# Patient Record
Sex: FEMALE | Race: BLACK OR AFRICAN AMERICAN | Employment: UNEMPLOYED | URBAN - METROPOLITAN AREA
[De-identification: names, ages, dates, MRNs, and addresses within clinical notes are randomized per-mention and may not be internally consistent; named-entity substitution may affect disease eponyms.]

---

## 2017-01-01 ENCOUNTER — HOSPITAL ENCOUNTER (OUTPATIENT)
Dept: OTHER | Age: 57
Discharge: HOME | End: 2017-01-31
Attending: OBSTETRICS & GYNECOLOGY | Admitting: OBSTETRICS & GYNECOLOGY

## 2017-02-01 ENCOUNTER — OFFICE VISIT (OUTPATIENT)
Dept: OBGYN CLINIC | Age: 57
End: 2017-02-01

## 2017-02-01 VITALS
HEIGHT: 62 IN | HEART RATE: 96 BPM | WEIGHT: 254 LBS | BODY MASS INDEX: 46.74 KG/M2 | TEMPERATURE: 98.5 F | SYSTOLIC BLOOD PRESSURE: 137 MMHG | DIASTOLIC BLOOD PRESSURE: 72 MMHG

## 2017-02-01 DIAGNOSIS — N95.0 POSTMENOPAUSAL BLEEDING: Primary | ICD-10-CM

## 2017-02-01 PROCEDURE — 99213 OFFICE O/P EST LOW 20 MIN: CPT | Performed by: OBSTETRICS & GYNECOLOGY

## 2017-02-24 ENCOUNTER — TELEPHONE (OUTPATIENT)
Dept: PERINATAL CARE | Age: 57
End: 2017-02-24

## 2017-03-01 ENCOUNTER — HOSPITAL ENCOUNTER (OUTPATIENT)
Dept: SURGERY | Age: 57
Discharge: HOME | End: 2017-03-01
Attending: OBSTETRICS & GYNECOLOGY | Admitting: OBSTETRICS & GYNECOLOGY

## 2017-03-01 VITALS
OXYGEN SATURATION: 98 % | SYSTOLIC BLOOD PRESSURE: 124 MMHG | WEIGHT: 254 LBS | RESPIRATION RATE: 16 BRPM | HEART RATE: 84 BPM | TEMPERATURE: 98 F | BODY MASS INDEX: 46.74 KG/M2 | HEIGHT: 62 IN | DIASTOLIC BLOOD PRESSURE: 60 MMHG

## 2017-03-01 PROCEDURE — 58561 HYSTEROSCOPY REMOVE MYOMA: CPT | Performed by: OBSTETRICS & GYNECOLOGY

## 2017-03-01 RX ORDER — SODIUM CHLORIDE 0.9 % (FLUSH) 0.9 %
10 SYRINGE (ML) INJECTION EVERY 12 HOURS SCHEDULED
Status: DISCONTINUED | OUTPATIENT
Start: 2017-03-01 | End: 2017-03-02 | Stop reason: HOSPADM

## 2017-03-01 RX ORDER — OXYCODONE HYDROCHLORIDE AND ACETAMINOPHEN 5; 325 MG/1; MG/1
2 TABLET ORAL PRN
Status: ACTIVE | OUTPATIENT
Start: 2017-03-01 | End: 2017-03-01

## 2017-03-01 RX ORDER — OXYCODONE HYDROCHLORIDE AND ACETAMINOPHEN 5; 325 MG/1; MG/1
1 TABLET ORAL PRN
Status: ACTIVE | OUTPATIENT
Start: 2017-03-01 | End: 2017-03-01

## 2017-03-01 RX ORDER — MEPERIDINE HYDROCHLORIDE 50 MG/ML
12.5 INJECTION INTRAMUSCULAR; INTRAVENOUS; SUBCUTANEOUS EVERY 5 MIN PRN
Status: DISCONTINUED | OUTPATIENT
Start: 2017-03-01 | End: 2017-03-02 | Stop reason: HOSPADM

## 2017-03-01 RX ORDER — HYDRALAZINE HYDROCHLORIDE 20 MG/ML
5 INJECTION INTRAMUSCULAR; INTRAVENOUS EVERY 10 MIN PRN
Status: DISCONTINUED | OUTPATIENT
Start: 2017-03-01 | End: 2017-03-02 | Stop reason: HOSPADM

## 2017-03-01 RX ORDER — SODIUM CHLORIDE 0.9 % (FLUSH) 0.9 %
10 SYRINGE (ML) INJECTION PRN
Status: DISCONTINUED | OUTPATIENT
Start: 2017-03-01 | End: 2017-03-02 | Stop reason: HOSPADM

## 2017-03-01 RX ORDER — ONDANSETRON 2 MG/ML
4 INJECTION INTRAMUSCULAR; INTRAVENOUS EVERY 10 MIN PRN
Status: DISCONTINUED | OUTPATIENT
Start: 2017-03-01 | End: 2017-03-02 | Stop reason: HOSPADM

## 2017-03-01 RX ORDER — LABETALOL HYDROCHLORIDE 5 MG/ML
5 INJECTION, SOLUTION INTRAVENOUS EVERY 10 MIN PRN
Status: DISCONTINUED | OUTPATIENT
Start: 2017-03-01 | End: 2017-03-02 | Stop reason: HOSPADM

## 2017-03-01 RX ORDER — SODIUM CHLORIDE, SODIUM LACTATE, POTASSIUM CHLORIDE, CALCIUM CHLORIDE 600; 310; 30; 20 MG/100ML; MG/100ML; MG/100ML; MG/100ML
INJECTION, SOLUTION INTRAVENOUS CONTINUOUS
Status: DISCONTINUED | OUTPATIENT
Start: 2017-03-01 | End: 2017-03-02 | Stop reason: HOSPADM

## 2017-03-01 RX ORDER — OXYCODONE HYDROCHLORIDE AND ACETAMINOPHEN 5; 325 MG/1; MG/1
1 TABLET ORAL EVERY 6 HOURS PRN
Qty: 30 TABLET | Refills: 0 | Status: SHIPPED | OUTPATIENT
Start: 2017-03-01 | End: 2017-03-08

## 2017-03-01 RX ORDER — LIDOCAINE HYDROCHLORIDE 10 MG/ML
1 INJECTION, SOLUTION EPIDURAL; INFILTRATION; INTRACAUDAL; PERINEURAL
Status: ACTIVE | OUTPATIENT
Start: 2017-03-01 | End: 2017-03-01

## 2017-03-01 RX ADMIN — Medication 0.25 MG: at 12:42

## 2017-03-01 RX ADMIN — SODIUM CHLORIDE, SODIUM LACTATE, POTASSIUM CHLORIDE, CALCIUM CHLORIDE: 600; 310; 30; 20 INJECTION, SOLUTION INTRAVENOUS at 09:52

## 2017-03-01 RX ADMIN — Medication 0.25 MG: at 12:30

## 2017-03-01 ASSESSMENT — PAIN SCALES - GENERAL
PAINLEVEL_OUTOF10: 7
PAINLEVEL_OUTOF10: 9
PAINLEVEL_OUTOF10: 0
PAINLEVEL_OUTOF10: 2

## 2017-03-01 ASSESSMENT — PAIN - FUNCTIONAL ASSESSMENT: PAIN_FUNCTIONAL_ASSESSMENT: 0-10

## 2017-03-15 ENCOUNTER — TELEPHONE (OUTPATIENT)
Dept: OBGYN CLINIC | Age: 57
End: 2017-03-15

## 2017-03-22 ENCOUNTER — OFFICE VISIT (OUTPATIENT)
Dept: OBGYN CLINIC | Age: 57
End: 2017-03-22

## 2017-03-22 VITALS
BODY MASS INDEX: 45.82 KG/M2 | DIASTOLIC BLOOD PRESSURE: 69 MMHG | WEIGHT: 249 LBS | SYSTOLIC BLOOD PRESSURE: 144 MMHG | HEIGHT: 62 IN | HEART RATE: 71 BPM

## 2017-03-22 DIAGNOSIS — Z98.890 POST-OPERATIVE STATE: Primary | ICD-10-CM

## 2017-03-22 PROCEDURE — 99024 POSTOP FOLLOW-UP VISIT: CPT | Performed by: OBSTETRICS & GYNECOLOGY

## 2017-03-29 PROBLEM — J98.11 ATELECTASIS: Status: ACTIVE | Noted: 2017-03-29

## 2017-04-04 ENCOUNTER — HOSPITAL ENCOUNTER (OUTPATIENT)
Dept: CT IMAGING | Age: 57
Discharge: HOME | End: 2017-04-04
Attending: OBSTETRICS & GYNECOLOGY | Admitting: OBSTETRICS & GYNECOLOGY

## 2017-04-04 DIAGNOSIS — C55 MALIGNANT MIXED MULLERIAN TUMOR (MMMT) OF UTERUS (HCC): ICD-10-CM

## 2017-04-04 DIAGNOSIS — C55 MALIGNANT NEOPLASM OF UTERUS (HCC): ICD-10-CM

## 2017-04-14 LAB
ABO GROUPING: NORMAL
ANTIBODY SCREEN: NEGATIVE
CA 125: 25.4 U/ML (ref 0–35)
POTASSIUM SERPL-SCNC: 5.7 MMOL/L (ref 3.5–5.1)
RH FACTOR: POSITIVE

## 2017-04-20 LAB — PATHOLOGY REPORT: NORMAL

## 2017-04-25 PROBLEM — C55 MALIGNANT MIXED MULLERIAN TUMOR (MMMT) OF UTERUS (HCC): Status: ACTIVE | Noted: 2017-04-25

## 2017-05-25 PROBLEM — C54.9: Status: ACTIVE | Noted: 2017-05-25

## 2017-07-27 PROBLEM — N18.30 CHRONIC KIDNEY DISEASE (CKD), STAGE III (MODERATE) (HCC): Status: ACTIVE | Noted: 2017-04-15

## 2018-05-22 ENCOUNTER — HOSPITAL ENCOUNTER (OUTPATIENT)
Dept: CT IMAGING | Age: 58
Discharge: HOME | End: 2018-05-22
Attending: INTERNAL MEDICINE | Admitting: INTERNAL MEDICINE

## 2018-05-22 DIAGNOSIS — C55 MALIGNANT NEOPLASM OF UTERUS (HCC): ICD-10-CM

## 2018-05-22 DIAGNOSIS — C55 MALIGNANT NEOPLASM OF UTERUS, UNSPECIFIED SITE (HCC): ICD-10-CM

## 2018-12-10 ENCOUNTER — HOSPITAL ENCOUNTER (OUTPATIENT)
Dept: MAMMOGRAPHY | Age: 58
Discharge: HOME | End: 2018-12-10
Payer: MEDICARE

## 2018-12-10 DIAGNOSIS — Z12.31 VISIT FOR SCREENING MAMMOGRAM: ICD-10-CM

## 2018-12-10 PROCEDURE — 77067 SCR MAMMO BI INCL CAD: CPT

## 2019-01-08 ENCOUNTER — HOSPITAL ENCOUNTER (OUTPATIENT)
Dept: CT IMAGING | Age: 59
Discharge: HOME | End: 2019-01-08
Payer: MEDICARE

## 2019-01-08 DIAGNOSIS — Q51.818 MULLERIAN ANOMALY OF UTERUS: ICD-10-CM

## 2019-01-08 PROCEDURE — 74176 CT ABD & PELVIS W/O CONTRAST: CPT

## 2019-01-08 PROCEDURE — 6360000004 HC RX CONTRAST MEDICATION: Performed by: INTERNAL MEDICINE

## 2019-01-08 RX ADMIN — IOHEXOL 50 ML: 240 INJECTION, SOLUTION INTRATHECAL; INTRAVASCULAR; INTRAVENOUS; ORAL at 13:50

## 2019-05-09 ENCOUNTER — HOSPITAL ENCOUNTER (OUTPATIENT)
Dept: CT IMAGING | Age: 59
Discharge: HOME | End: 2019-05-09
Payer: MEDICARE

## 2019-05-09 DIAGNOSIS — C55 MALIGNANT NEOPLASM OF UTERUS, UNSPECIFIED SITE (HCC): ICD-10-CM

## 2019-05-09 LAB
GFR AFRICAN AMERICAN: 27
GFR NON-AFRICAN AMERICAN: 23
PERFORMED ON: ABNORMAL
POC CREATININE: 2.2 MG/DL (ref 0.6–1.2)
POC SAMPLE TYPE: ABNORMAL

## 2019-05-09 PROCEDURE — 82565 ASSAY OF CREATININE: CPT

## 2019-05-09 PROCEDURE — 6360000004 HC RX CONTRAST MEDICATION: Performed by: INTERNAL MEDICINE

## 2019-05-09 PROCEDURE — 74176 CT ABD & PELVIS W/O CONTRAST: CPT

## 2019-05-09 RX ADMIN — IOHEXOL 50 ML: 240 INJECTION, SOLUTION INTRATHECAL; INTRAVASCULAR; INTRAVENOUS; ORAL at 12:53

## 2019-11-12 ENCOUNTER — HOSPITAL ENCOUNTER (OUTPATIENT)
Dept: CT IMAGING | Age: 59
Discharge: HOME | End: 2019-11-12
Payer: MEDICARE

## 2019-11-12 DIAGNOSIS — C55 MALIGNANT MIXED MULLERIAN TUMOR (MMMT) OF UTERUS (HCC): ICD-10-CM

## 2019-11-12 PROCEDURE — 74176 CT ABD & PELVIS W/O CONTRAST: CPT

## 2020-06-18 ENCOUNTER — HOSPITAL ENCOUNTER (OUTPATIENT)
Dept: CT IMAGING | Age: 60
Discharge: HOME | End: 2020-06-18
Payer: MEDICARE

## 2020-06-18 LAB
GFR AFRICAN AMERICAN: 20
GFR NON-AFRICAN AMERICAN: 16
PERFORMED ON: ABNORMAL
POC CREATININE: 2.9 MG/DL (ref 0.6–1.2)
POC SAMPLE TYPE: ABNORMAL

## 2020-06-18 PROCEDURE — 6360000004 HC RX CONTRAST MEDICATION: Performed by: INTERNAL MEDICINE

## 2020-06-18 PROCEDURE — 74176 CT ABD & PELVIS W/O CONTRAST: CPT

## 2020-06-18 PROCEDURE — 82565 ASSAY OF CREATININE: CPT

## 2020-06-18 RX ADMIN — IOHEXOL 50 ML: 240 INJECTION, SOLUTION INTRATHECAL; INTRAVASCULAR; INTRAVENOUS; ORAL at 17:53

## 2021-09-24 ENCOUNTER — HOSPITAL ENCOUNTER (OUTPATIENT)
Dept: MAMMOGRAPHY | Age: 61
Discharge: HOME | End: 2021-09-24
Payer: MEDICARE

## 2021-09-24 VITALS — BODY MASS INDEX: 46.01 KG/M2 | HEIGHT: 62 IN | WEIGHT: 250 LBS

## 2021-09-24 DIAGNOSIS — Z12.31 VISIT FOR SCREENING MAMMOGRAM: ICD-10-CM

## 2021-09-24 PROCEDURE — 77067 SCR MAMMO BI INCL CAD: CPT

## 2023-09-15 ENCOUNTER — HOSPITAL ENCOUNTER (OUTPATIENT)
Dept: MAMMOGRAPHY | Age: 63
Discharge: HOME | End: 2023-09-15
Payer: MEDICARE

## 2023-09-15 VITALS — HEIGHT: 62 IN | WEIGHT: 293 LBS | BODY MASS INDEX: 53.92 KG/M2

## 2023-09-15 DIAGNOSIS — Z12.31 VISIT FOR SCREENING MAMMOGRAM: ICD-10-CM

## 2023-09-15 PROCEDURE — 77067 SCR MAMMO BI INCL CAD: CPT

## 2024-01-12 DIAGNOSIS — D63.1 ANEMIA OF CHRONIC RENAL FAILURE, STAGE 5 (HCC): Primary | ICD-10-CM

## 2024-01-12 DIAGNOSIS — N18.5 ANEMIA OF CHRONIC RENAL FAILURE, STAGE 5 (HCC): Primary | ICD-10-CM

## 2024-01-12 PROBLEM — D50.9 IRON (FE) DEFICIENCY ANEMIA: Status: ACTIVE | Noted: 2024-01-12

## 2024-01-12 RX ORDER — DIPHENHYDRAMINE HYDROCHLORIDE 50 MG/ML
50 INJECTION INTRAMUSCULAR; INTRAVENOUS
OUTPATIENT
Start: 2024-01-12

## 2024-01-12 RX ORDER — ONDANSETRON 2 MG/ML
8 INJECTION INTRAMUSCULAR; INTRAVENOUS
OUTPATIENT
Start: 2024-01-12

## 2024-01-12 RX ORDER — SODIUM CHLORIDE 9 MG/ML
5-250 INJECTION, SOLUTION INTRAVENOUS PRN
OUTPATIENT
Start: 2024-01-12

## 2024-01-12 RX ORDER — EPINEPHRINE 1 MG/ML
0.3 INJECTION, SOLUTION INTRAMUSCULAR; SUBCUTANEOUS PRN
OUTPATIENT
Start: 2024-01-12

## 2024-01-12 RX ORDER — HEPARIN 100 UNIT/ML
500 SYRINGE INTRAVENOUS PRN
OUTPATIENT
Start: 2024-01-12

## 2024-01-12 RX ORDER — ALBUTEROL SULFATE 90 UG/1
4 AEROSOL, METERED RESPIRATORY (INHALATION) PRN
OUTPATIENT
Start: 2024-01-12

## 2024-01-12 RX ORDER — SODIUM CHLORIDE 0.9 % (FLUSH) 0.9 %
5-40 SYRINGE (ML) INJECTION PRN
OUTPATIENT
Start: 2024-01-12

## 2024-01-12 RX ORDER — SODIUM CHLORIDE 9 MG/ML
INJECTION, SOLUTION INTRAVENOUS CONTINUOUS
OUTPATIENT
Start: 2024-01-12

## 2024-01-12 RX ORDER — ACETAMINOPHEN 325 MG/1
650 TABLET ORAL
OUTPATIENT
Start: 2024-01-12

## 2024-02-02 RX ORDER — SODIUM CHLORIDE 9 MG/ML
5-250 INJECTION, SOLUTION INTRAVENOUS PRN
OUTPATIENT
Start: 2024-02-02

## 2024-02-02 RX ORDER — ACETAMINOPHEN 325 MG/1
650 TABLET ORAL
OUTPATIENT
Start: 2024-02-02

## 2024-02-02 RX ORDER — SODIUM CHLORIDE 9 MG/ML
INJECTION, SOLUTION INTRAVENOUS CONTINUOUS
OUTPATIENT
Start: 2024-02-02

## 2024-02-02 RX ORDER — EPINEPHRINE 1 MG/ML
0.3 INJECTION, SOLUTION INTRAMUSCULAR; SUBCUTANEOUS PRN
OUTPATIENT
Start: 2024-02-02

## 2024-02-02 RX ORDER — HEPARIN 100 UNIT/ML
500 SYRINGE INTRAVENOUS PRN
OUTPATIENT
Start: 2024-02-02

## 2024-02-02 RX ORDER — ALBUTEROL SULFATE 90 UG/1
4 AEROSOL, METERED RESPIRATORY (INHALATION) PRN
OUTPATIENT
Start: 2024-02-02

## 2024-02-02 RX ORDER — DIPHENHYDRAMINE HYDROCHLORIDE 50 MG/ML
50 INJECTION INTRAMUSCULAR; INTRAVENOUS
OUTPATIENT
Start: 2024-02-02

## 2024-02-02 RX ORDER — SODIUM CHLORIDE 0.9 % (FLUSH) 0.9 %
5-40 SYRINGE (ML) INJECTION PRN
OUTPATIENT
Start: 2024-02-02

## 2024-02-02 RX ORDER — ONDANSETRON 2 MG/ML
8 INJECTION INTRAMUSCULAR; INTRAVENOUS
OUTPATIENT
Start: 2024-02-02

## 2024-02-23 ENCOUNTER — HOSPITAL ENCOUNTER (OUTPATIENT)
Dept: ONCOLOGY | Age: 64
Setting detail: INFUSION SERIES
Discharge: HOME | End: 2024-02-23
Payer: MEDICARE

## 2024-02-23 VITALS
DIASTOLIC BLOOD PRESSURE: 76 MMHG | HEART RATE: 72 BPM | RESPIRATION RATE: 14 BRPM | TEMPERATURE: 97.7 F | OXYGEN SATURATION: 96 % | SYSTOLIC BLOOD PRESSURE: 173 MMHG

## 2024-02-23 DIAGNOSIS — D63.1 ANEMIA OF CHRONIC RENAL FAILURE, STAGE 5 (HCC): Primary | ICD-10-CM

## 2024-02-23 DIAGNOSIS — N18.5 ANEMIA OF CHRONIC RENAL FAILURE, STAGE 5 (HCC): Primary | ICD-10-CM

## 2024-02-23 PROCEDURE — 6360000002 HC RX W HCPCS: Performed by: INTERNAL MEDICINE

## 2024-02-23 PROCEDURE — 96374 THER/PROPH/DIAG INJ IV PUSH: CPT

## 2024-02-23 RX ORDER — HEPARIN 100 UNIT/ML
500 SYRINGE INTRAVENOUS PRN
OUTPATIENT
Start: 2024-03-01

## 2024-02-23 RX ORDER — SODIUM CHLORIDE 9 MG/ML
5-250 INJECTION, SOLUTION INTRAVENOUS PRN
OUTPATIENT
Start: 2024-03-01

## 2024-02-23 RX ORDER — SODIUM CHLORIDE 9 MG/ML
5-250 INJECTION, SOLUTION INTRAVENOUS PRN
Status: DISCONTINUED | OUTPATIENT
Start: 2024-02-23 | End: 2024-02-24 | Stop reason: HOSPADM

## 2024-02-23 RX ORDER — DIPHENHYDRAMINE HYDROCHLORIDE 50 MG/ML
50 INJECTION INTRAMUSCULAR; INTRAVENOUS
OUTPATIENT
Start: 2024-03-01

## 2024-02-23 RX ORDER — ACETAMINOPHEN 325 MG/1
650 TABLET ORAL
OUTPATIENT
Start: 2024-03-01

## 2024-02-23 RX ORDER — ALBUTEROL SULFATE 90 UG/1
4 AEROSOL, METERED RESPIRATORY (INHALATION) PRN
OUTPATIENT
Start: 2024-03-01

## 2024-02-23 RX ORDER — SODIUM CHLORIDE 0.9 % (FLUSH) 0.9 %
5-40 SYRINGE (ML) INJECTION PRN
OUTPATIENT
Start: 2024-03-01

## 2024-02-23 RX ORDER — EPINEPHRINE 1 MG/ML
0.3 INJECTION, SOLUTION INTRAMUSCULAR; SUBCUTANEOUS PRN
OUTPATIENT
Start: 2024-03-01

## 2024-02-23 RX ORDER — SODIUM CHLORIDE 9 MG/ML
INJECTION, SOLUTION INTRAVENOUS CONTINUOUS
OUTPATIENT
Start: 2024-03-01

## 2024-02-23 RX ORDER — ONDANSETRON 2 MG/ML
8 INJECTION INTRAMUSCULAR; INTRAVENOUS
OUTPATIENT
Start: 2024-03-01

## 2024-02-23 RX ADMIN — IRON SUCROSE 200 MG: 20 INJECTION, SOLUTION INTRAVENOUS at 14:51

## 2024-02-23 NOTE — PROGRESS NOTES
Pt seen and assessed [facility]PO today for 200 mg IV push Venofer per orders from Dr. Box.  Pt tolerated infusion well and without incident.  Pt verbalizes understanding of discharge instructions.  Discharged via wheelchair to home with transportation.

## 2024-06-21 ENCOUNTER — APPOINTMENT (OUTPATIENT)
Dept: CT IMAGING | Age: 64
DRG: 870 | End: 2024-06-21
Payer: MEDICARE

## 2024-06-21 ENCOUNTER — APPOINTMENT (OUTPATIENT)
Dept: VASCULAR LAB | Age: 64
DRG: 870 | End: 2024-06-21
Attending: EMERGENCY MEDICINE
Payer: MEDICARE

## 2024-06-21 ENCOUNTER — APPOINTMENT (OUTPATIENT)
Dept: GENERAL RADIOLOGY | Age: 64
DRG: 870 | End: 2024-06-21
Payer: MEDICARE

## 2024-06-21 ENCOUNTER — HOSPITAL ENCOUNTER (EMERGENCY)
Age: 64
Discharge: HOME | DRG: 870 | End: 2024-06-21
Attending: EMERGENCY MEDICINE
Payer: MEDICARE

## 2024-06-21 ENCOUNTER — HOSPITAL ENCOUNTER (INPATIENT)
Age: 64
LOS: 8 days | Discharge: ANOTHER HOSPITAL | DRG: 870 | End: 2024-06-29
Attending: EMERGENCY MEDICINE | Admitting: INTERNAL MEDICINE
Payer: MEDICARE

## 2024-06-21 VITALS
OXYGEN SATURATION: 96 % | HEART RATE: 115 BPM | DIASTOLIC BLOOD PRESSURE: 78 MMHG | TEMPERATURE: 99.6 F | RESPIRATION RATE: 24 BRPM | WEIGHT: 293 LBS | SYSTOLIC BLOOD PRESSURE: 93 MMHG | BODY MASS INDEX: 51.91 KG/M2 | HEIGHT: 63 IN

## 2024-06-21 DIAGNOSIS — N18.5 ACUTE RENAL FAILURE SUPERIMPOSED ON STAGE 5 CHRONIC KIDNEY DISEASE, NOT ON CHRONIC DIALYSIS, UNSPECIFIED ACUTE RENAL FAILURE TYPE (HCC): ICD-10-CM

## 2024-06-21 DIAGNOSIS — N17.9 ACUTE RENAL FAILURE, UNSPECIFIED ACUTE RENAL FAILURE TYPE (HCC): ICD-10-CM

## 2024-06-21 DIAGNOSIS — I99.8 ISCHEMIA OF FOOT: ICD-10-CM

## 2024-06-21 DIAGNOSIS — N17.9 ACUTE RENAL FAILURE SUPERIMPOSED ON STAGE 5 CHRONIC KIDNEY DISEASE, NOT ON CHRONIC DIALYSIS, UNSPECIFIED ACUTE RENAL FAILURE TYPE (HCC): ICD-10-CM

## 2024-06-21 DIAGNOSIS — M79.604 PAIN IN RIGHT LEG: Primary | ICD-10-CM

## 2024-06-21 DIAGNOSIS — L03.115 CELLULITIS OF RIGHT LOWER EXTREMITY: Primary | ICD-10-CM

## 2024-06-21 DIAGNOSIS — I46.9 CARDIAC ARREST (HCC): ICD-10-CM

## 2024-06-21 DIAGNOSIS — A41.9 SEPTICEMIA (HCC): ICD-10-CM

## 2024-06-21 LAB
ALBUMIN SERPL-MCNC: 2.9 G/DL (ref 3.4–5)
ANION GAP SERPL CALCULATED.3IONS-SCNC: 18 MMOL/L (ref 3–16)
ANION GAP SERPL CALCULATED.3IONS-SCNC: 20 MMOL/L (ref 3–16)
ANION GAP SERPL CALCULATED.3IONS-SCNC: 22 MMOL/L (ref 3–16)
BASE EXCESS BLDA CALC-SCNC: -11.2 MMOL/L (ref -3–3)
BASE EXCESS BLDA CALC-SCNC: -12.4 MMOL/L (ref -3–3)
BASE EXCESS BLDV CALC-SCNC: -11.2 MMOL/L (ref -2–3)
BASE EXCESS BLDV CALC-SCNC: -9.6 MMOL/L (ref -2–3)
BASOPHILS # BLD: 0 K/UL (ref 0–0.2)
BASOPHILS # BLD: 0 K/UL (ref 0–0.2)
BASOPHILS NFR BLD: 0.2 %
BASOPHILS NFR BLD: 0.6 %
BUN SERPL-MCNC: 61 MG/DL (ref 7–20)
BUN SERPL-MCNC: 70 MG/DL (ref 7–20)
BUN SERPL-MCNC: 79 MG/DL (ref 7–20)
CALCIUM SERPL-MCNC: 8.5 MG/DL (ref 8.3–10.6)
CALCIUM SERPL-MCNC: 8.6 MG/DL (ref 8.3–10.6)
CALCIUM SERPL-MCNC: 9.1 MG/DL (ref 8.3–10.6)
CHLORIDE SERPL-SCNC: 101 MMOL/L (ref 99–110)
CHLORIDE SERPL-SCNC: 97 MMOL/L (ref 99–110)
CHLORIDE SERPL-SCNC: 98 MMOL/L (ref 99–110)
CO2 BLDA-SCNC: 18 MMOL/L
CO2 BLDA-SCNC: 18 MMOL/L
CO2 BLDV-SCNC: 17 MMOL/L
CO2 BLDV-SCNC: 19 MMOL/L
CO2 SERPL-SCNC: 14 MMOL/L (ref 21–32)
CO2 SERPL-SCNC: 15 MMOL/L (ref 21–32)
CO2 SERPL-SCNC: 17 MMOL/L (ref 21–32)
COHGB MFR BLDA: 0.8 % (ref 0–1.5)
COHGB MFR BLDA: 1.1 % (ref 0–1.5)
COHGB MFR BLDV: 1.1 % (ref 0–1.5)
COHGB MFR BLDV: 1.2 % (ref 0–1.5)
CREAT SERPL-MCNC: 5 MG/DL (ref 0.6–1.2)
CREAT SERPL-MCNC: 5.9 MG/DL (ref 0.6–1.2)
CREAT SERPL-MCNC: 6.2 MG/DL (ref 0.6–1.2)
DEPRECATED RDW RBC AUTO: 15.2 % (ref 12.4–15.4)
DEPRECATED RDW RBC AUTO: 15.6 % (ref 12.4–15.4)
DO-HGB MFR BLDV: 31.3 %
DO-HGB MFR BLDV: 42.2 %
ECHO BSA: 2.48 M2
EOSINOPHIL # BLD: 0 K/UL (ref 0–0.6)
EOSINOPHIL # BLD: 0 K/UL (ref 0–0.6)
EOSINOPHIL NFR BLD: 0.4 %
EOSINOPHIL NFR BLD: 0.4 %
GFR SERPLBLD CREATININE-BSD FMLA CKD-EPI: 7 ML/MIN/{1.73_M2}
GFR SERPLBLD CREATININE-BSD FMLA CKD-EPI: 7 ML/MIN/{1.73_M2}
GFR SERPLBLD CREATININE-BSD FMLA CKD-EPI: 9 ML/MIN/{1.73_M2}
GLUCOSE SERPL-MCNC: 123 MG/DL (ref 70–99)
GLUCOSE SERPL-MCNC: 77 MG/DL (ref 70–99)
GLUCOSE SERPL-MCNC: 83 MG/DL (ref 70–99)
HCO3 BLDA-SCNC: 16 MMOL/L (ref 21–29)
HCO3 BLDA-SCNC: 17 MMOL/L (ref 21–29)
HCO3 BLDV-SCNC: 16.3 MMOL/L (ref 24–28)
HCO3 BLDV-SCNC: 17.5 MMOL/L (ref 24–28)
HCT VFR BLD AUTO: 29.6 % (ref 36–48)
HCT VFR BLD AUTO: 29.9 % (ref 36–48)
HGB BLD-MCNC: 9.7 G/DL (ref 12–16)
HGB BLD-MCNC: 9.9 G/DL (ref 12–16)
HGB BLDA-MCNC: 11.7 G/DL
HGB BLDA-MCNC: 5.7 G/DL
LACTATE BLDV-SCNC: 3.9 MMOL/L (ref 0.4–2)
LACTATE BLDV-SCNC: 4.4 MMOL/L (ref 0.4–2)
LACTATE BLDV-SCNC: 4.5 MMOL/L (ref 0.4–2)
LYMPHOCYTES # BLD: 0.1 K/UL (ref 1–5.1)
LYMPHOCYTES # BLD: 0.2 K/UL (ref 1–5.1)
LYMPHOCYTES NFR BLD: 7.2 %
LYMPHOCYTES NFR BLD: 7.3 %
MAGNESIUM SERPL-MCNC: 0.9 MG/DL (ref 1.8–2.4)
MAGNESIUM SERPL-MCNC: 1.7 MG/DL (ref 1.8–2.4)
MCH RBC QN AUTO: 31 PG (ref 26–34)
MCH RBC QN AUTO: 31.5 PG (ref 26–34)
MCHC RBC AUTO-ENTMCNC: 32.7 G/DL (ref 31–36)
MCHC RBC AUTO-ENTMCNC: 33 G/DL (ref 31–36)
MCV RBC AUTO: 95 FL (ref 80–100)
MCV RBC AUTO: 95.4 FL (ref 80–100)
METHGB MFR BLDA: 0.6 % (ref 0–1.4)
METHGB MFR BLDA: 1 % (ref 0–1.4)
METHGB MFR BLDV: 0.3 % (ref 0–1.5)
METHGB MFR BLDV: 0.8 % (ref 0–1.5)
MONOCYTES # BLD: 0.2 K/UL (ref 0–1.3)
MONOCYTES # BLD: 0.4 K/UL (ref 0–1.3)
MONOCYTES NFR BLD: 10.6 %
MONOCYTES NFR BLD: 12.6 %
NEUTROPHILS # BLD: 1.1 K/UL (ref 1.7–7.7)
NEUTROPHILS # BLD: 2.7 K/UL (ref 1.7–7.7)
NEUTROPHILS NFR BLD: 79.6 %
NEUTROPHILS NFR BLD: 81.1 %
NT-PROBNP SERPL-MCNC: ABNORMAL PG/ML (ref 0–124)
PCO2 BLDA: 46 MMHG (ref 35–45)
PCO2 BLDA: 49.6 MMHG (ref 35–45)
PCO2 BLDV: 34.6 MMHG (ref 41–51)
PCO2 BLDV: 52.2 MMHG (ref 41–51)
PH BLDA: 7.14 [PH] (ref 7.35–7.45)
PH BLDA: 7.15 [PH] (ref 7.35–7.45)
PH BLDV: 7.13 [PH] (ref 7.35–7.45)
PH BLDV: 7.28 [PH] (ref 7.35–7.45)
PHOSPHATE SERPL-MCNC: 8.1 MG/DL (ref 2.5–4.9)
PLATELET # BLD AUTO: 131 K/UL (ref 135–450)
PLATELET # BLD AUTO: 181 K/UL (ref 135–450)
PMV BLD AUTO: 9.1 FL (ref 5–10.5)
PMV BLD AUTO: 9.4 FL (ref 5–10.5)
PO2 BLDA: 170 MMHG (ref 75–108)
PO2 BLDA: 97 MMHG (ref 75–108)
PO2 BLDV: 34.7 MMHG (ref 25–40)
PO2 BLDV: 46.6 MMHG (ref 25–40)
POTASSIUM SERPL-SCNC: 4.4 MMOL/L (ref 3.5–5.1)
POTASSIUM SERPL-SCNC: 5.2 MMOL/L (ref 3.5–5.1)
POTASSIUM SERPL-SCNC: 5.4 MMOL/L (ref 3.5–5.1)
RBC # BLD AUTO: 3.11 M/UL (ref 4–5.2)
RBC # BLD AUTO: 3.13 M/UL (ref 4–5.2)
SAO2 % BLDA: 100 % (ref 93–100)
SAO2 % BLDA: 97 % (ref 93–100)
SAO2 % BLDV: 57 %
SAO2 % BLDV: 68 %
SODIUM SERPL-SCNC: 131 MMOL/L (ref 136–145)
SODIUM SERPL-SCNC: 135 MMOL/L (ref 136–145)
SODIUM SERPL-SCNC: 136 MMOL/L (ref 136–145)
TROPONIN, HIGH SENSITIVITY: 45 NG/L (ref 0–14)
TROPONIN, HIGH SENSITIVITY: 54 NG/L (ref 0–14)
TROPONIN, HIGH SENSITIVITY: 84 NG/L (ref 0–14)
WBC # BLD AUTO: 1.4 K/UL (ref 4–11)
WBC # BLD AUTO: 3.4 K/UL (ref 4–11)

## 2024-06-21 PROCEDURE — 73590 X-RAY EXAM OF LOWER LEG: CPT

## 2024-06-21 PROCEDURE — 36415 COLL VENOUS BLD VENIPUNCTURE: CPT

## 2024-06-21 PROCEDURE — 73700 CT LOWER EXTREMITY W/O DYE: CPT

## 2024-06-21 PROCEDURE — 94002 VENT MGMT INPAT INIT DAY: CPT

## 2024-06-21 PROCEDURE — 51702 INSERT TEMP BLADDER CATH: CPT

## 2024-06-21 PROCEDURE — 80048 BASIC METABOLIC PNL TOTAL CA: CPT

## 2024-06-21 PROCEDURE — 3E033XZ INTRODUCTION OF VASOPRESSOR INTO PERIPHERAL VEIN, PERCUTANEOUS APPROACH: ICD-10-PCS | Performed by: STUDENT IN AN ORGANIZED HEALTH CARE EDUCATION/TRAINING PROGRAM

## 2024-06-21 PROCEDURE — 84484 ASSAY OF TROPONIN QUANT: CPT

## 2024-06-21 PROCEDURE — 6360000002 HC RX W HCPCS: Performed by: EMERGENCY MEDICINE

## 2024-06-21 PROCEDURE — 6360000002 HC RX W HCPCS

## 2024-06-21 PROCEDURE — 2500000003 HC RX 250 WO HCPCS

## 2024-06-21 PROCEDURE — 2580000003 HC RX 258: Performed by: EMERGENCY MEDICINE

## 2024-06-21 PROCEDURE — 85025 COMPLETE CBC W/AUTO DIFF WBC: CPT

## 2024-06-21 PROCEDURE — 2000000000 HC ICU R&B

## 2024-06-21 PROCEDURE — 31500 INSERT EMERGENCY AIRWAY: CPT

## 2024-06-21 PROCEDURE — 96366 THER/PROPH/DIAG IV INF ADDON: CPT

## 2024-06-21 PROCEDURE — 5A1955Z RESPIRATORY VENTILATION, GREATER THAN 96 CONSECUTIVE HOURS: ICD-10-PCS

## 2024-06-21 PROCEDURE — 83880 ASSAY OF NATRIURETIC PEPTIDE: CPT

## 2024-06-21 PROCEDURE — 82803 BLOOD GASES ANY COMBINATION: CPT

## 2024-06-21 PROCEDURE — 99291 CRITICAL CARE FIRST HOUR: CPT

## 2024-06-21 PROCEDURE — 5A12012 PERFORMANCE OF CARDIAC OUTPUT, SINGLE, MANUAL: ICD-10-PCS | Performed by: STUDENT IN AN ORGANIZED HEALTH CARE EDUCATION/TRAINING PROGRAM

## 2024-06-21 PROCEDURE — 83605 ASSAY OF LACTIC ACID: CPT

## 2024-06-21 PROCEDURE — 96375 TX/PRO/DX INJ NEW DRUG ADDON: CPT

## 2024-06-21 PROCEDURE — 87641 MR-STAPH DNA AMP PROBE: CPT

## 2024-06-21 PROCEDURE — 93971 EXTREMITY STUDY: CPT

## 2024-06-21 PROCEDURE — 0BH17EZ INSERTION OF ENDOTRACHEAL AIRWAY INTO TRACHEA, VIA NATURAL OR ARTIFICIAL OPENING: ICD-10-PCS

## 2024-06-21 PROCEDURE — 99284 EMERGENCY DEPT VISIT MOD MDM: CPT

## 2024-06-21 PROCEDURE — 96365 THER/PROPH/DIAG IV INF INIT: CPT

## 2024-06-21 PROCEDURE — 71045 X-RAY EXAM CHEST 1 VIEW: CPT

## 2024-06-21 PROCEDURE — 94761 N-INVAS EAR/PLS OXIMETRY MLT: CPT

## 2024-06-21 PROCEDURE — 73630 X-RAY EXAM OF FOOT: CPT

## 2024-06-21 PROCEDURE — 2580000003 HC RX 258

## 2024-06-21 PROCEDURE — 83735 ASSAY OF MAGNESIUM: CPT

## 2024-06-21 PROCEDURE — 6370000000 HC RX 637 (ALT 250 FOR IP): Performed by: EMERGENCY MEDICINE

## 2024-06-21 PROCEDURE — 37799 UNLISTED PX VASCULAR SURGERY: CPT

## 2024-06-21 PROCEDURE — 80069 RENAL FUNCTION PANEL: CPT

## 2024-06-21 PROCEDURE — 02HV33Z INSERTION OF INFUSION DEVICE INTO SUPERIOR VENA CAVA, PERCUTANEOUS APPROACH: ICD-10-PCS | Performed by: STUDENT IN AN ORGANIZED HEALTH CARE EDUCATION/TRAINING PROGRAM

## 2024-06-21 PROCEDURE — 2500000003 HC RX 250 WO HCPCS: Performed by: EMERGENCY MEDICINE

## 2024-06-21 PROCEDURE — 6370000000 HC RX 637 (ALT 250 FOR IP)

## 2024-06-21 PROCEDURE — 36556 INSERT NON-TUNNEL CV CATH: CPT

## 2024-06-21 PROCEDURE — 93971 EXTREMITY STUDY: CPT | Performed by: SURGERY

## 2024-06-21 PROCEDURE — 36620 INSERTION CATHETER ARTERY: CPT

## 2024-06-21 PROCEDURE — 36600 WITHDRAWAL OF ARTERIAL BLOOD: CPT

## 2024-06-21 PROCEDURE — 94660 CPAP INITIATION&MGMT: CPT

## 2024-06-21 PROCEDURE — 2700000000 HC OXYGEN THERAPY PER DAY

## 2024-06-21 PROCEDURE — 96374 THER/PROPH/DIAG INJ IV PUSH: CPT

## 2024-06-21 RX ORDER — EPINEPHRINE 0.1 MG/ML
INJECTION INTRAVENOUS
Status: DISPENSED
Start: 2024-06-21 | End: 2024-06-22

## 2024-06-21 RX ORDER — HYDROMORPHONE HYDROCHLORIDE 1 MG/ML
1 INJECTION, SOLUTION INTRAMUSCULAR; INTRAVENOUS; SUBCUTANEOUS ONCE
Status: COMPLETED | OUTPATIENT
Start: 2024-06-21 | End: 2024-06-21

## 2024-06-21 RX ORDER — POLYETHYLENE GLYCOL 3350 17 G/17G
17 POWDER, FOR SOLUTION ORAL DAILY PRN
Status: DISCONTINUED | OUTPATIENT
Start: 2024-06-21 | End: 2024-06-26

## 2024-06-21 RX ORDER — SODIUM CHLORIDE 0.9 % (FLUSH) 0.9 %
5-40 SYRINGE (ML) INJECTION PRN
Status: DISCONTINUED | OUTPATIENT
Start: 2024-06-21 | End: 2024-06-30 | Stop reason: HOSPADM

## 2024-06-21 RX ORDER — FUROSEMIDE 10 MG/ML
40 INJECTION INTRAMUSCULAR; INTRAVENOUS ONCE
Status: DISCONTINUED | OUTPATIENT
Start: 2024-06-21 | End: 2024-06-21

## 2024-06-21 RX ORDER — LABETALOL 300 MG/1
300 TABLET, FILM COATED ORAL 2 TIMES DAILY
COMMUNITY

## 2024-06-21 RX ORDER — ONDANSETRON 4 MG/1
4 TABLET, ORALLY DISINTEGRATING ORAL EVERY 8 HOURS PRN
Status: DISCONTINUED | OUTPATIENT
Start: 2024-06-21 | End: 2024-06-30 | Stop reason: HOSPADM

## 2024-06-21 RX ORDER — SODIUM CHLORIDE, SODIUM LACTATE, POTASSIUM CHLORIDE, AND CALCIUM CHLORIDE .6; .31; .03; .02 G/100ML; G/100ML; G/100ML; G/100ML
1000 INJECTION, SOLUTION INTRAVENOUS ONCE
Status: COMPLETED | OUTPATIENT
Start: 2024-06-21 | End: 2024-06-21

## 2024-06-21 RX ORDER — ACETAMINOPHEN 325 MG/1
650 TABLET ORAL ONCE
Status: COMPLETED | OUTPATIENT
Start: 2024-06-21 | End: 2024-06-21

## 2024-06-21 RX ORDER — HEPARIN SODIUM 5000 [USP'U]/ML
5000 INJECTION, SOLUTION INTRAVENOUS; SUBCUTANEOUS EVERY 8 HOURS SCHEDULED
Status: DISCONTINUED | OUTPATIENT
Start: 2024-06-21 | End: 2024-06-30 | Stop reason: HOSPADM

## 2024-06-21 RX ORDER — EPOETIN ALFA-EPBX 20000 [IU]/ML
20000 INJECTION, SOLUTION INTRAVENOUS; SUBCUTANEOUS
COMMUNITY

## 2024-06-21 RX ORDER — METHOCARBAMOL 500 MG/1
500 TABLET, FILM COATED ORAL 3 TIMES DAILY
COMMUNITY

## 2024-06-21 RX ORDER — CALCIUM CHLORIDE 100 MG/ML
INJECTION INTRAVENOUS; INTRAVENTRICULAR DAILY PRN
Status: COMPLETED | OUTPATIENT
Start: 2024-06-21 | End: 2024-06-21

## 2024-06-21 RX ORDER — ETOMIDATE 2 MG/ML
INJECTION INTRAVENOUS
Status: DISPENSED
Start: 2024-06-21 | End: 2024-06-22

## 2024-06-21 RX ORDER — ACETAMINOPHEN 325 MG/1
650 TABLET ORAL EVERY 6 HOURS PRN
Status: DISCONTINUED | OUTPATIENT
Start: 2024-06-21 | End: 2024-06-30 | Stop reason: HOSPADM

## 2024-06-21 RX ORDER — SODIUM CHLORIDE, SODIUM LACTATE, POTASSIUM CHLORIDE, AND CALCIUM CHLORIDE .6; .31; .03; .02 G/100ML; G/100ML; G/100ML; G/100ML
500 INJECTION, SOLUTION INTRAVENOUS ONCE
Status: DISCONTINUED | OUTPATIENT
Start: 2024-06-21 | End: 2024-06-24

## 2024-06-21 RX ORDER — ROCURONIUM BROMIDE 50 MG/5 ML
SYRINGE (ML) INTRAVENOUS DAILY PRN
Status: COMPLETED | OUTPATIENT
Start: 2024-06-21 | End: 2024-06-21

## 2024-06-21 RX ORDER — MAGNESIUM SULFATE IN WATER 40 MG/ML
2000 INJECTION, SOLUTION INTRAVENOUS ONCE
Status: COMPLETED | OUTPATIENT
Start: 2024-06-22 | End: 2024-06-22

## 2024-06-21 RX ORDER — TORSEMIDE 20 MG/1
2 TABLET ORAL DAILY
COMMUNITY

## 2024-06-21 RX ORDER — ROCURONIUM BROMIDE 10 MG/ML
INJECTION, SOLUTION INTRAVENOUS
Status: DISPENSED
Start: 2024-06-21 | End: 2024-06-22

## 2024-06-21 RX ORDER — NOREPINEPHRINE BITARTRATE 1 MG/ML
INJECTION, SOLUTION INTRAVENOUS
Status: COMPLETED
Start: 2024-06-21 | End: 2024-06-21

## 2024-06-21 RX ORDER — ACETAMINOPHEN 325 MG/1
650 TABLET ORAL EVERY 8 HOURS PRN
COMMUNITY

## 2024-06-21 RX ORDER — EPOETIN ALFA-EPBX 2000 [IU]/ML
2000 INJECTION, SOLUTION INTRAVENOUS; SUBCUTANEOUS
Status: ON HOLD | COMMUNITY
End: 2024-06-21 | Stop reason: CLARIF

## 2024-06-21 RX ORDER — SODIUM CHLORIDE 0.9 % (FLUSH) 0.9 %
5-40 SYRINGE (ML) INJECTION EVERY 12 HOURS SCHEDULED
Status: DISCONTINUED | OUTPATIENT
Start: 2024-06-21 | End: 2024-06-30 | Stop reason: HOSPADM

## 2024-06-21 RX ORDER — MAGNESIUM SULFATE IN WATER 40 MG/ML
2000 INJECTION, SOLUTION INTRAVENOUS ONCE
Status: COMPLETED | OUTPATIENT
Start: 2024-06-21 | End: 2024-06-21

## 2024-06-21 RX ORDER — ONDANSETRON 2 MG/ML
4 INJECTION INTRAMUSCULAR; INTRAVENOUS EVERY 6 HOURS PRN
Status: DISCONTINUED | OUTPATIENT
Start: 2024-06-21 | End: 2024-06-30 | Stop reason: HOSPADM

## 2024-06-21 RX ORDER — ACETAMINOPHEN 650 MG/1
650 SUPPOSITORY RECTAL EVERY 6 HOURS PRN
Status: DISCONTINUED | OUTPATIENT
Start: 2024-06-21 | End: 2024-06-30 | Stop reason: HOSPADM

## 2024-06-21 RX ORDER — ETOMIDATE 2 MG/ML
INJECTION INTRAVENOUS DAILY PRN
Status: COMPLETED | OUTPATIENT
Start: 2024-06-21 | End: 2024-06-21

## 2024-06-21 RX ORDER — SODIUM CHLORIDE 9 MG/ML
INJECTION, SOLUTION INTRAVENOUS PRN
Status: DISCONTINUED | OUTPATIENT
Start: 2024-06-21 | End: 2024-06-30 | Stop reason: HOSPADM

## 2024-06-21 RX ORDER — ACETAMINOPHEN 650 MG/1
650 SUPPOSITORY RECTAL ONCE
Status: COMPLETED | OUTPATIENT
Start: 2024-06-21 | End: 2024-06-21

## 2024-06-21 RX ORDER — PATIROMER 16.8 G/1
1 POWDER, FOR SUSPENSION ORAL DAILY
COMMUNITY

## 2024-06-21 RX ORDER — MORPHINE SULFATE 4 MG/ML
4 INJECTION INTRAVENOUS ONCE
Status: COMPLETED | OUTPATIENT
Start: 2024-06-21 | End: 2024-06-21

## 2024-06-21 RX ORDER — GUAIFENESIN AND DEXTROMETHORPHAN HYDROBROMIDE 100; 10 MG/5ML; MG/5ML
10 SOLUTION ORAL EVERY 6 HOURS PRN
COMMUNITY

## 2024-06-21 RX ORDER — EPINEPHRINE IN SOD CHLOR,ISO 1 MG/10 ML
SYRINGE (ML) INTRAVENOUS DAILY PRN
Status: COMPLETED | OUTPATIENT
Start: 2024-06-21 | End: 2024-06-21

## 2024-06-21 RX ADMIN — MORPHINE SULFATE 4 MG: 4 INJECTION INTRAVENOUS at 05:40

## 2024-06-21 RX ADMIN — CEFEPIME 2000 MG: 2 INJECTION, POWDER, FOR SOLUTION INTRAVENOUS at 17:39

## 2024-06-21 RX ADMIN — HYDROMORPHONE HYDROCHLORIDE 1 MG: 1 INJECTION, SOLUTION INTRAMUSCULAR; INTRAVENOUS; SUBCUTANEOUS at 06:34

## 2024-06-21 RX ADMIN — SODIUM BICARBONATE 50 MEQ: 84 INJECTION, SOLUTION INTRAVENOUS at 19:08

## 2024-06-21 RX ADMIN — PIPERACILLIN AND TAZOBACTAM 4500 MG: 4; .5 INJECTION, POWDER, LYOPHILIZED, FOR SOLUTION INTRAVENOUS at 23:52

## 2024-06-21 RX ADMIN — SODIUM CHLORIDE, SODIUM LACTATE, POTASSIUM CHLORIDE, AND CALCIUM CHLORIDE 1000 ML: .6; .31; .03; .02 INJECTION, SOLUTION INTRAVENOUS at 19:00

## 2024-06-21 RX ADMIN — Medication 1 MG: at 18:48

## 2024-06-21 RX ADMIN — ETOMIDATE 20 MG: 2 INJECTION, SOLUTION INTRAVENOUS at 18:53

## 2024-06-21 RX ADMIN — EPINEPHRINE 0.1 MCG/KG/MIN: 1 INJECTION INTRAMUSCULAR; INTRAVENOUS; SUBCUTANEOUS at 19:21

## 2024-06-21 RX ADMIN — SODIUM BICARBONATE 50 MEQ: 84 INJECTION, SOLUTION INTRAVENOUS at 19:11

## 2024-06-21 RX ADMIN — SODIUM BICARBONATE 50 MEQ: 84 INJECTION, SOLUTION INTRAVENOUS at 18:57

## 2024-06-21 RX ADMIN — MAGNESIUM SULFATE HEPTAHYDRATE 2000 MG: 40 INJECTION, SOLUTION INTRAVENOUS at 17:34

## 2024-06-21 RX ADMIN — CALCIUM CHLORIDE 1000 MG: 100 INJECTION, SOLUTION INTRAVENOUS at 18:48

## 2024-06-21 RX ADMIN — NOREPINEPHRINE BITARTRATE 10 MCG/MIN: 1 INJECTION, SOLUTION, CONCENTRATE INTRAVENOUS at 22:58

## 2024-06-21 RX ADMIN — SODIUM CHLORIDE, PRESERVATIVE FREE 10 ML: 5 INJECTION INTRAVENOUS at 22:46

## 2024-06-21 RX ADMIN — ACETAMINOPHEN 650 MG: 325 TABLET ORAL at 10:49

## 2024-06-21 RX ADMIN — Medication 500 MG: at 18:52

## 2024-06-21 RX ADMIN — Medication 1 MG: at 18:55

## 2024-06-21 RX ADMIN — VASOPRESSIN 0.03 UNITS/MIN: 20 INJECTION INTRAVENOUS at 23:45

## 2024-06-21 RX ADMIN — Medication 100 MG: at 18:53

## 2024-06-21 RX ADMIN — HEPARIN SODIUM 5000 UNITS: 5000 INJECTION INTRAVENOUS; SUBCUTANEOUS at 23:10

## 2024-06-21 RX ADMIN — NOREPINEPHRINE BITARTRATE 16 MG: 1 INJECTION, SOLUTION, CONCENTRATE INTRAVENOUS at 23:07

## 2024-06-21 RX ADMIN — VANCOMYCIN HYDROCHLORIDE 2000 MG: 10 INJECTION, POWDER, LYOPHILIZED, FOR SOLUTION INTRAVENOUS at 20:27

## 2024-06-21 RX ADMIN — EPINEPHRINE 0.5 MCG/KG/MIN: 1 INJECTION INTRAMUSCULAR; INTRAVENOUS; SUBCUTANEOUS at 21:58

## 2024-06-21 RX ADMIN — ACETAMINOPHEN 650 MG: 650 SUPPOSITORY RECTAL at 17:33

## 2024-06-21 RX ADMIN — Medication 1 MG: at 19:06

## 2024-06-21 RX ADMIN — Medication 1 MG: at 18:58

## 2024-06-21 RX ADMIN — EPINEPHRINE 0.5 MCG/KG/MIN: 1 INJECTION INTRAMUSCULAR; INTRAVENOUS; SUBCUTANEOUS at 23:16

## 2024-06-21 RX ADMIN — Medication 1 MG: at 18:45

## 2024-06-21 RX ADMIN — Medication 1 MG: at 19:10

## 2024-06-21 RX ADMIN — EPINEPHRINE 0.5 MCG/KG/MIN: 1 INJECTION INTRAMUSCULAR; INTRAVENOUS; SUBCUTANEOUS at 20:46

## 2024-06-21 RX ADMIN — Medication 1 MG: at 19:02

## 2024-06-21 ASSESSMENT — PAIN SCALES - GENERAL
PAINLEVEL_OUTOF10: 0
PAINLEVEL_OUTOF10: 10

## 2024-06-21 ASSESSMENT — PAIN DESCRIPTION - ORIENTATION
ORIENTATION: RIGHT

## 2024-06-21 ASSESSMENT — PAIN DESCRIPTION - DESCRIPTORS
DESCRIPTORS: STABBING

## 2024-06-21 ASSESSMENT — PULMONARY FUNCTION TESTS
PIF_VALUE: 24
PIF_VALUE: 22
PIF_VALUE: 27
PIF_VALUE: 25
PIF_VALUE: 24
PIF_VALUE: 31

## 2024-06-21 ASSESSMENT — PAIN - FUNCTIONAL ASSESSMENT
PAIN_FUNCTIONAL_ASSESSMENT: 0-10
PAIN_FUNCTIONAL_ASSESSMENT: 0-10

## 2024-06-21 ASSESSMENT — ENCOUNTER SYMPTOMS
EYES NEGATIVE: 1
GASTROINTESTINAL NEGATIVE: 1
RESPIRATORY NEGATIVE: 1

## 2024-06-21 ASSESSMENT — PAIN DESCRIPTION - LOCATION
LOCATION: FOOT;LEG
LOCATION: FOOT;LEG
LOCATION: LEG;FOOT

## 2024-06-21 ASSESSMENT — LIFESTYLE VARIABLES
HOW MANY STANDARD DRINKS CONTAINING ALCOHOL DO YOU HAVE ON A TYPICAL DAY: PATIENT DOES NOT DRINK
HOW OFTEN DO YOU HAVE A DRINK CONTAINING ALCOHOL: NEVER

## 2024-06-21 NOTE — ED PROVIDER NOTES
THE [facility]  EMERGENCY DEPARTMENT ENCOUNTER          ATTENDING PHYSICIAN NOTE       Date of evaluation: 6/21/2024    ADDENDUM:      Care of this patient was assumed from Dr Mccollum.  The patient was seen for Leg Pain (Patient presents to ED via [employer] from [facility] with report of right foot and lower leg pain and swelling. Patient reports first noticing the pain and swelling at 1900 last night, denies any known injury or trauma to the extremity.)  .  The patient's initial evaluation and plan have been discussed with the prior provider who initially evaluated the patient.  Nursing Notes, Past Medical Hx, Past Surgical Hx, Social Hx, Allergies, and Family Hx were all reviewed.    ASSESSMENT / PLAN  (MEDICAL DECISION MAKING)     Marcela Colby is a 64 y.o. female Who presents with pain and swelling in the right leg, she reports to me for several days.  On exam, the lower extremity appears warm and well-perfused.  It does have swelling compared to the left leg, but no erythema, no abscess no obvious infected wounds no signs of cellulitis.  We did obtain DVT ultrasound which was negative.  No osseous abnormality was noted.  The patient does have edema in both legs, and says that sometimes asymmetry seems to be there.  Otherwise her renal function appears essentially at baseline, does follow with nephrology for it.  Note was made of some leukopenia on CBC, anemia that appears stable.  On review of the patient's chart she has history of pancytopenia, and is being followed as an outpatient.  Otherwise she appears at her baseline and says that her pain is better now.  This may be due to edema in the lower extremity due to her chronic kidney disease, and we recommended support hose or wrapping to help with that as well, but no obvious signs of cellulitis, necrotizing infection.  No sign of DVT on ultrasound.  Preserved pulses and the foot appears well-perfused.  Recommended follow-up and return for any

## 2024-06-21 NOTE — DISCHARGE INSTRUCTIONS
Return to emergency department for worsening symptoms or other concerns, redness in the leg, increased pain, or any other problems.  Take acetaminophen for pain at home; keep leg elevated when possible at home, consider support hose/ compressive stocking for swelling.  Come back for leg redness, fever, shortness of breath, or any other problems.  Follow up with nephrology regarding your kidney function, and with your primary care doctor

## 2024-06-21 NOTE — ED PROVIDER NOTES
ED Attending Attestation Note     Date of evaluation: 6/21/2024    This patient was seen by the resident.  I have seen and examined the patient, agree with the workup, evaluation, management and diagnosis. The care plan has been discussed.      I have reviewed the ECG and concur with the resident's interpretation.      My assessment reveals a 64-year-old female presenting to the emergency department with a complaint of right leg pain altered mental status.  Patient was seen in the emergency department earlier today where she was evaluated and had negative duplex of her right lower extremity.  She Hien presented with was initially thought to be respiratory distress.  On exam had rhonchorous breath sounds.  Was placed on noninvasive positive pressure ventilation her labs however are more indicative of likely sepsis she is febrile she has a leukopenia had a more severe leukopenia earlier this morning she has acute renal failure with a creatinine greater than 5.  She has very poor urine output here in the emergency department.  She also has a lactate of 4.4 initially.  We were concerned for volume overload given a significantly elevated BNP so have elected to not give the patient 30 cc per kilo, but have covered her with broad-spectrum antibiotics.  The patient had a CT of her right lower extremity which per my interpretation does not appear to show any abscess or focal fluid collections.  I do feel that her right lower extremity cellulitis is her likely source.    Is this patient to be included in the SEP-1 core measure? Yes SEP-1 CORE MEASURE DATA      Sepsis Criteria   Severe Sepsis Criteria   Septic Shock Criteria       Must meet 2:    [x]Temp >100.9 F (38.3 C) or < 96.8 F (36 C)  [x]HR > 90  []RR > 20  []WBC > 12 or < 4 or 10% bands    AND:    [] Infection Confirmed or Suspected.     Must meet 1:    [x]Lactate > 2       or   [x]Signs of Organ Dysfunction:    - SBP < 90 or MAP < 65  -Creatinine > 2 or increased from  baseline  -Urine Output < 0.5 ml/kg/hr  -Bilirubin > 2  -INR > 1.5 (not anticoagulated)  -Platelets < 100,000  -Acute Respiratory Failure as evidenced by new need for NIPPV or mechanical ventilation   Must meet 1:    [x]Lactate > 4        or   []SBP < 90 or MAP < 65 for at least two readings in the first hour after fluid bolus administration    []Vasopressors initiated (if hypotension persists after fluid resuscitation)   Patient Vitals for the past 6 hrs:   BP Temp Pulse Resp SpO2   06/21/24 1615 (!) 147/114 (!) 102.3 °F (39.1 °C) (!) 108 (!) 42 --   06/21/24 1630 -- -- (!) 105 (!) 32 100 %   06/21/24 1633 -- -- (!) 107 (!) 38 100 %   06/21/24 1709 -- -- (!) 104 (!) 36 100 %      Recent Labs     06/21/24  0539 06/21/24  1619   WBC 1.4* 3.4*   LACTA  --  4.4*   CREATININE 5.0* 5.9*   * 181        Septic shock identified date: 6/21/24 time: 1800    Fluid Resuscitation Rationale: less than 30mL/kg because of concern for fluid overload and patient/patient advocate made an informed decision to decline more aggressive fluid resuscitation    Repeat lactate level: ordered and pending at this time    Reassessment Exam: I have reassessed tissue perfusion and hemodynamic status after fluid bolus at this date/time: 1840 on 6/21/24     Ryan Mitchell MD  06/21/24 1841      Addendum:    As the patient was in the process of being admitted she suffered a PEA cardiac arrest initially had narrow complex rhythm and the 30s.  CPR was started code was called she was given multiple rounds of epinephrine the patient was intubated she was given several doses of bicarbonate as well as several doses of calcium.  The exact cause of the cardiac arrest is not entirely clear but favored to be related to acidosis.  She has a postintubation chest x-ray which showed the ET tube requiring to be withdrawn by approximately 2 cm.  Respiratory therapy will be doing this shortly.  During the rest she also had the placement of a left femoral

## 2024-06-21 NOTE — ED PROVIDER NOTES
THE [facility]  EMERGENCY DEPARTMENT ENCOUNTER          EM RESIDENT NOTE       Date of evaluation: 6/21/2024    Chief Complaint     Respiratory Distress (Pt brought in by EMS from [facility] for respiratory distress, CIPAP placed en route )      History of Present Illness     Marcela Colby is a 64 y.o. female with a past medical history of CKD V, hypertension, anemia who presented to the ED with dyspnea.    Patient was seen in the emergency department early this morning for right lower extremity pain.  Per chart review, it appears that patient's symptoms began late last night.  Her examination included basic labs, which were remarkable for creatinine elevated to 5.0 as well as plain films, lower extremity Doppler which unremarkable.  Patient was ultimately discharged.    She re-presents today for dyspnea.  Per EMS, they received a call for shortness of breath.  She was found to be hypoxic, which was minimally improved with supplemental O2 via nasal cannula.  Patient was then placed on CPAP on route.    Upon arrival to the emergency department, patient is severely dyspneic.  She is unable to provide additional history given her critical illness.    MEDICAL DECISION MAKING / ASSESSMENT / PLAN     INITIAL VITALS: BP: (!) 147/114, Temp: (!) 102.3 °F (39.1 °C), Pulse: (!) 108, Respirations: (!) 42, SpO2: 100 %    Marcela Colby is a 64 y.o. female with a past medical history as described in the HPI who presents with acute onset dyspnea.  Upon arrival to the emergency department, patient was ill-appearing.  Initial vital signs remarkable for tachycardia with an initial heart rate in the 100s.  Patient was also tachypneic with respiratory rates in the 40s while on BiPAP, febrile with initial temperature of 102.3 °F, mildly hypertensive with initial blood pressure of 147/114.  Physical exam was somewhat rhonchorous breath sounds throughout.  Exam with mild erythema of the right lower extremity with large blister over the  Regular rhythm. Tachycardia present.      Pulses: Normal pulses.      Heart sounds: Normal heart sounds.   Pulmonary:      Effort: Tachypnea present. No accessory muscle usage.      Breath sounds: No decreased air movement. No wheezing or rales.      Comments: Diffuse rhonchi.   Abdominal:      General: Abdomen is flat. There is no distension.      Palpations: Abdomen is soft.      Tenderness: There is no abdominal tenderness.   Musculoskeletal:         General: No deformity.      Cervical back: Neck supple.      Right lower leg: Edema present.      Left lower leg: Edema present.   Skin:     General: Skin is warm and dry.      Comments: Erythema noted along the medial aspect of the right leg with large blister over the medial aspect of the ankle.                Sophia Dutta MD  Resident  06/21/24 2005

## 2024-06-21 NOTE — PROGRESS NOTES
06/21/24 1934   Encounter Summary   Encounter Overview/Reason Crisis   Last Encounter  06/21/24  (SC)   Crisis   Type Code Blue  (Pt stablized and receiving treatment)     Student xavier Shelley

## 2024-06-21 NOTE — ED PROVIDER NOTES
THE [facility]  EMERGENCY DEPARTMENT ENCOUNTER          ATTENDING PHYSICIAN NOTE       Date of evaluation: 6/21/2024    Chief Complaint     Leg Pain (Patient presents to ED via [employer] from [facility] with report of right foot and lower leg pain and swelling. Patient reports first noticing the pain and swelling at 1900 last night, denies any known injury or trauma to the extremity.)      History of Present Illness     Marcela Colby is a 64 y.o. female who presents to the emergency department complaining of pain to the right leg and foot.  Patient states symptoms started approximately 10 hours prior to presentation.  She describes it as a stabbing pain located predominately the anterior aspect of the leg.  She denies any radiation above the knee.  She denies any chest pain or shortness of breath.  She denies any nausea or vomiting.  She has not tried taking anything for the pain.  She denies having pain like this in the past.    ASSESSMENT / PLAN  (MEDICAL DECISION MAKING)     INITIAL VITALS: BP: 130/60, Temp: 99.6 °F (37.6 °C), Pulse: (!) 114, Respirations: 22, SpO2: 95 %      Marcela Colby is a 64 y.o. female who presents complaining of right leg pain.  Patient describes acute onset of right leg pain 10 hours prior to presentation.  She denies having pain like this in the past.  On arrival, patient appears uncomfortable.  She is morbidly obese and has significant swelling to the bilateral lower extremities, though the right appears worse than the left.  She has faintly palpable DP pulse but DP and PT are both dopplerable and I feel likely her limitation of palpation is due to body habitus.  CBC is significant for white blood cell count of 1.4.  Renal panel shows creatinine of 5.0 which is appears at the patient's baseline.  X-rays of the leg demonstrate no osseous abnormalities and no subcutaneous gas.  Duplex ultrasound to evaluate for DVT is pending.  At this time, care of the patient be turned  about 13 years ago. Her smoking use included cigarettes. She started smoking about 25 years ago. She has a 12.0 pack-year smoking history. She has never used smokeless tobacco. She reports that she does not drink alcohol and does not use drugs.    Medications     Previous Medications    ACETAMINOPHEN (TYLENOL) 500 MG TABLET    Take 500 mg by mouth every 8 hours as needed for Pain     AMLODIPINE (NORVASC) 5 MG TABLET    Take 5 mg by mouth daily    ATORVASTATIN (LIPITOR) 10 MG TABLET    Take 10 mg by mouth daily    CALCITRIOL (ROCALTROL) 0.5 MCG CAPSULE    Take 0.5 mcg by mouth daily    CHOLECALCIFEROL (VITAMIN D3) 1000 UNITS TABS    Take by mouth daily    DOCUSATE SODIUM (COLACE) 100 MG CAPSULE    Take 100 mg by mouth 2 times daily    FERROUS SULFATE 325 (65 FE) MG TABLET    Take 325 mg by mouth 3 times daily (with meals)    FOLIC ACID (FOLVITE) 1 MG TABLET    Take 1 mg by mouth daily    LEVOTHYROXINE (SYNTHROID) 50 MCG TABLET    Take 50 mcg by mouth Daily    LOPERAMIDE HCL PO    Take by mouth    LOSARTAN (COZAAR) 50 MG TABLET    Take 50 mg by mouth daily    OLANZAPINE (ZYPREXA) 15 MG TABLET    Take 15 mg by mouth nightly    POLYETHYLENE GLYCOL (MIRALAX) POWDER    Take by mouth    SERTRALINE (ZOLOFT) 50 MG TABLET    Take 25 mg by mouth daily     SODIUM BICARBONATE 650 MG TABLET    Take 650 mg by mouth 4 times daily       Allergies     She has No Known Allergies.    Physical Exam     INITIAL VITALS: BP: 130/60, Temp: 99.6 °F (37.6 °C), Pulse: (!) 114, Respirations: 22, SpO2: 95 %   Physical Exam  Vitals and nursing note reviewed.   Constitutional:       General: She is not in acute distress.     Appearance: She is morbidly obese.   Cardiovascular:      Comments: Patient has faintly palpable DP pulse that is confirmed with Doppler.  PT pulse is nonpalpable but is confirmed by Doppler.  Palpation is likely limited secondary to patient's body habitus.  Musculoskeletal:      Comments: Bilateral lower extremity edema  present, right greater than left,.  There is mild warmth to the right side compared to the left   Neurological:      Mental Status: She is alert.                    Abhishek Mccollum MD  06/21/24 0705

## 2024-06-22 ENCOUNTER — APPOINTMENT (OUTPATIENT)
Dept: CT IMAGING | Age: 64
DRG: 870 | End: 2024-06-22
Payer: MEDICARE

## 2024-06-22 ENCOUNTER — APPOINTMENT (OUTPATIENT)
Age: 64
DRG: 870 | End: 2024-06-22
Payer: MEDICARE

## 2024-06-22 ENCOUNTER — APPOINTMENT (OUTPATIENT)
Dept: GENERAL RADIOLOGY | Age: 64
DRG: 870 | End: 2024-06-22
Payer: MEDICARE

## 2024-06-22 ENCOUNTER — APPOINTMENT (OUTPATIENT)
Dept: NUCLEAR MEDICINE | Age: 64
DRG: 870 | End: 2024-06-22
Payer: MEDICARE

## 2024-06-22 PROBLEM — A41.9 SEPTIC SHOCK (HCC): Status: ACTIVE | Noted: 2024-06-22

## 2024-06-22 PROBLEM — L03.115 CELLULITIS OF RIGHT LOWER EXTREMITY: Status: ACTIVE | Noted: 2024-06-22

## 2024-06-22 PROBLEM — N18.5 ACUTE RENAL FAILURE SUPERIMPOSED ON STAGE 5 CHRONIC KIDNEY DISEASE, NOT ON CHRONIC DIALYSIS (HCC): Status: ACTIVE | Noted: 2024-06-22

## 2024-06-22 PROBLEM — N17.9 ACUTE RENAL FAILURE SUPERIMPOSED ON STAGE 5 CHRONIC KIDNEY DISEASE, NOT ON CHRONIC DIALYSIS (HCC): Status: ACTIVE | Noted: 2024-06-22

## 2024-06-22 PROBLEM — G93.41 ACUTE METABOLIC ENCEPHALOPATHY: Status: ACTIVE | Noted: 2024-06-22

## 2024-06-22 PROBLEM — R65.21 SEPTIC SHOCK (HCC): Status: ACTIVE | Noted: 2024-06-22

## 2024-06-22 LAB
ABO + RH BLD: NORMAL
ACANTHOCYTES BLD QL SMEAR: ABNORMAL
ACANTHOCYTES BLD QL SMEAR: ABNORMAL
ALBUMIN SERPL-MCNC: 2.5 G/DL (ref 3.4–5)
ALBUMIN SERPL-MCNC: 2.7 G/DL (ref 3.4–5)
ALBUMIN/GLOB SERPL: 0.9 {RATIO} (ref 1.1–2.2)
ALP SERPL-CCNC: 35 U/L (ref 40–129)
ALP SERPL-CCNC: 40 U/L (ref 40–129)
ALT SERPL-CCNC: 1237 U/L (ref 10–40)
ALT SERPL-CCNC: 1340 U/L (ref 10–40)
AMMONIA PLAS-SCNC: 26 UMOL/L (ref 11–51)
ANION GAP SERPL CALCULATED.3IONS-SCNC: 19 MMOL/L (ref 3–16)
ANION GAP SERPL CALCULATED.3IONS-SCNC: 20 MMOL/L (ref 3–16)
ANION GAP SERPL CALCULATED.3IONS-SCNC: 21 MMOL/L (ref 3–16)
ANISOCYTOSIS BLD QL SMEAR: ABNORMAL
AST SERPL-CCNC: 1251 U/L (ref 15–37)
AST SERPL-CCNC: 1669 U/L (ref 15–37)
BACTERIA URNS QL MICRO: ABNORMAL /HPF
BASE EXCESS BLDA CALC-SCNC: -10.1 MMOL/L (ref -3–3)
BASE EXCESS BLDA CALC-SCNC: -9 MMOL/L (ref -3–3)
BASOPHILS # BLD: 0 K/UL (ref 0–0.2)
BASOPHILS NFR BLD: 0 %
BILIRUB DIRECT SERPL-MCNC: 0.4 MG/DL (ref 0–0.3)
BILIRUB INDIRECT SERPL-MCNC: 0.2 MG/DL (ref 0–1)
BILIRUB SERPL-MCNC: 0.6 MG/DL (ref 0–1)
BILIRUB SERPL-MCNC: 0.7 MG/DL (ref 0–1)
BILIRUB UR QL STRIP.AUTO: NEGATIVE
BLD GP AB SCN SERPL QL: NORMAL
BLOOD BANK DISPENSE STATUS: NORMAL
BLOOD BANK PRODUCT CODE: NORMAL
BPU ID: NORMAL
BUN SERPL-MCNC: 83 MG/DL (ref 7–20)
BUN SERPL-MCNC: 84 MG/DL (ref 7–20)
BUN SERPL-MCNC: 88 MG/DL (ref 7–20)
CALCIUM SERPL-MCNC: 7.6 MG/DL (ref 8.3–10.6)
CALCIUM SERPL-MCNC: 7.7 MG/DL (ref 8.3–10.6)
CALCIUM SERPL-MCNC: 8 MG/DL (ref 8.3–10.6)
CHLORIDE SERPL-SCNC: 100 MMOL/L (ref 99–110)
CHLORIDE SERPL-SCNC: 96 MMOL/L (ref 99–110)
CHLORIDE SERPL-SCNC: 96 MMOL/L (ref 99–110)
CLARITY UR: CLEAR
CO2 BLDA-SCNC: 18 MMOL/L
CO2 BLDA-SCNC: 18 MMOL/L
CO2 SERPL-SCNC: 15 MMOL/L (ref 21–32)
CO2 SERPL-SCNC: 16 MMOL/L (ref 21–32)
CO2 SERPL-SCNC: 16 MMOL/L (ref 21–32)
COHGB MFR BLDA: 3.5 % (ref 0–1.5)
COLOR UR: YELLOW
CREAT SERPL-MCNC: 6.3 MG/DL (ref 0.6–1.2)
CREAT SERPL-MCNC: 6.6 MG/DL (ref 0.6–1.2)
CREAT SERPL-MCNC: 7.2 MG/DL (ref 0.6–1.2)
DEPRECATED RDW RBC AUTO: 15.5 % (ref 12.4–15.4)
DEPRECATED RDW RBC AUTO: 15.9 % (ref 12.4–15.4)
DEPRECATED RDW RBC AUTO: 16 % (ref 12.4–15.4)
DESCRIPTION BLOOD BANK: NORMAL
ECHO AO ASC DIAM: 3.2 CM
ECHO AO ASCENDING AORTA INDEX: 1.37 CM/M2
ECHO AO ROOT DIAM: 3.1 CM
ECHO AO ROOT INDEX: 1.32 CM/M2
ECHO AV AREA PEAK VELOCITY: 2.2 CM2
ECHO AV AREA/BSA PEAK VELOCITY: 0.9 CM2/M2
ECHO AV PEAK GRADIENT: 8 MMHG
ECHO AV PEAK VELOCITY: 1.4 M/S
ECHO AV VELOCITY RATIO: 0.71
ECHO BSA: 2.51 M2
ECHO EST RA PRESSURE: 8 MMHG
ECHO IVC PROX: 2.2 CM
ECHO LA AREA 2C: 19 CM2
ECHO LA AREA 4C: 25 CM2
ECHO LA DIAMETER INDEX: 1.88 CM/M2
ECHO LA DIAMETER: 4.4 CM
ECHO LA MAJOR AXIS: 6.1 CM
ECHO LA MINOR AXIS: 5.4 CM
ECHO LA TO AORTIC ROOT RATIO: 1.42
ECHO LA VOL BP: 73 ML (ref 22–52)
ECHO LA VOL MOD A2C: 57 ML (ref 22–52)
ECHO LA VOL MOD A4C: 85 ML (ref 22–52)
ECHO LA VOL/BSA BIPLANE: 31 ML/M2 (ref 16–34)
ECHO LA VOLUME INDEX MOD A2C: 24 ML/M2 (ref 16–34)
ECHO LA VOLUME INDEX MOD A4C: 36 ML/M2 (ref 16–34)
ECHO LV E' LATERAL VELOCITY: 6 CM/S
ECHO LV E' SEPTAL VELOCITY: 6 CM/S
ECHO LV EDV A2C: 138 ML
ECHO LV EDV A4C: 168 ML
ECHO LV EDV INDEX A4C: 72 ML/M2
ECHO LV EDV NDEX A2C: 59 ML/M2
ECHO LV EJECTION FRACTION A2C: 31 %
ECHO LV EJECTION FRACTION A4C: 34 %
ECHO LV EJECTION FRACTION BIPLANE: 32 % (ref 55–100)
ECHO LV ESV A2C: 95 ML
ECHO LV ESV A4C: 111 ML
ECHO LV ESV INDEX A2C: 41 ML/M2
ECHO LV ESV INDEX A4C: 47 ML/M2
ECHO LV FRACTIONAL SHORTENING: 28 % (ref 28–44)
ECHO LV INTERNAL DIMENSION DIASTOLE INDEX: 2.31 CM/M2
ECHO LV INTERNAL DIMENSION DIASTOLIC: 5.4 CM (ref 3.9–5.3)
ECHO LV INTERNAL DIMENSION SYSTOLIC INDEX: 1.67 CM/M2
ECHO LV INTERNAL DIMENSION SYSTOLIC: 3.9 CM
ECHO LV IVSD: 1.2 CM (ref 0.6–0.9)
ECHO LV MASS 2D: 311.7 G (ref 67–162)
ECHO LV MASS INDEX 2D: 133.2 G/M2 (ref 43–95)
ECHO LV POSTERIOR WALL DIASTOLIC: 1.5 CM (ref 0.6–0.9)
ECHO LV POSTERIOR WALL SYSTOLIC: 1.4 CM
ECHO LV RELATIVE WALL THICKNESS RATIO: 0.56
ECHO LVOT AREA: 3.1 CM2
ECHO LVOT DIAM: 2 CM
ECHO LVOT PEAK GRADIENT: 4 MMHG
ECHO LVOT PEAK VELOCITY: 1 M/S
ECHO MV A VELOCITY: 0.66 M/S
ECHO MV E DECELERATION TIME (DT): 153 MS
ECHO MV E VELOCITY: 0.83 M/S
ECHO MV E/A RATIO: 1.26
ECHO MV E/E' LATERAL: 13.83
ECHO MV E/E' RATIO (AVERAGED): 13.83
ECHO MV E/E' SEPTAL: 13.83
ECHO MV MAX VELOCITY: 1 M/S
ECHO MV MEAN GRADIENT: 2 MMHG
ECHO MV MEAN VELOCITY: 0.7 M/S
ECHO MV PEAK GRADIENT: 4 MMHG
ECHO MV VTI: 20.2 CM
ECHO PULMONARY ARTERY END DIASTOLIC PRESSURE: 5 MMHG
ECHO PV MAX VELOCITY: 1 M/S
ECHO PV PEAK GRADIENT: 4 MMHG
ECHO PV REGURGITANT MAX VELOCITY: 1.1 M/S
ECHO RA AREA 4C: 13.8 CM2
ECHO RA END SYSTOLIC VOLUME APICAL 4 CHAMBER INDEX BSA: 13 ML/M2
ECHO RA VOLUME: 30 ML
ECHO RIGHT VENTRICULAR SYSTOLIC PRESSURE (RVSP): 26 MMHG
ECHO RV FREE WALL PEAK S': 11 CM/S
ECHO RV TAPSE: 1.3 CM (ref 1.7–?)
ECHO TV REGURGITANT MAX VELOCITY: 2.1 M/S
ECHO TV REGURGITANT PEAK GRADIENT: 18 MMHG
EOSINOPHIL # BLD: 0 K/UL (ref 0–0.6)
EOSINOPHIL NFR BLD: 0 %
EPI CELLS #/AREA URNS HPF: ABNORMAL /HPF (ref 0–5)
GFR SERPLBLD CREATININE-BSD FMLA CKD-EPI: 6 ML/MIN/{1.73_M2}
GFR SERPLBLD CREATININE-BSD FMLA CKD-EPI: 6 ML/MIN/{1.73_M2}
GFR SERPLBLD CREATININE-BSD FMLA CKD-EPI: 7 ML/MIN/{1.73_M2}
GLUCOSE BLD-MCNC: 86 MG/DL (ref 70–99)
GLUCOSE SERPL-MCNC: 144 MG/DL (ref 70–99)
GLUCOSE SERPL-MCNC: 148 MG/DL (ref 70–99)
GLUCOSE SERPL-MCNC: 86 MG/DL (ref 70–99)
GLUCOSE UR STRIP.AUTO-MCNC: NEGATIVE MG/DL
HCO3 BLDA-SCNC: 16.7 MMOL/L (ref 21–29)
HCO3 BLDA-SCNC: 17 MMOL/L (ref 21–29)
HCT VFR BLD AUTO: 27.8 % (ref 36–48)
HCT VFR BLD AUTO: 27.9 % (ref 36–48)
HCT VFR BLD AUTO: 29.1 % (ref 36–48)
HGB BLD-MCNC: 9 G/DL (ref 12–16)
HGB BLD-MCNC: 9.1 G/DL (ref 12–16)
HGB BLD-MCNC: 9.4 G/DL (ref 12–16)
HGB BLDA-MCNC: 17.6 G/DL
HGB UR QL STRIP.AUTO: ABNORMAL
HYPOCHROMIA BLD QL SMEAR: ABNORMAL
HYPOCHROMIA BLD QL SMEAR: ABNORMAL
INR PPP: 2.8 (ref 0.85–1.15)
KETONES UR STRIP.AUTO-MCNC: ABNORMAL MG/DL
LACTATE BLD-SCNC: 2.79 MMOL/L (ref 0.4–2)
LACTATE BLDV-SCNC: 3.3 MMOL/L (ref 0.4–2)
LACTATE BLDV-SCNC: 3.4 MMOL/L (ref 0.4–2)
LACTATE BLDV-SCNC: 3.4 MMOL/L (ref 0.4–2)
LACTATE BLDV-SCNC: 3.5 MMOL/L (ref 0.4–2)
LACTATE BLDV-SCNC: 3.6 MMOL/L (ref 0.4–2)
LEUKOCYTE ESTERASE UR QL STRIP.AUTO: NEGATIVE
LYMPHOCYTES # BLD: 0.2 K/UL (ref 1–5.1)
LYMPHOCYTES # BLD: 0.3 K/UL (ref 1–5.1)
LYMPHOCYTES # BLD: 0.9 K/UL (ref 1–5.1)
LYMPHOCYTES NFR BLD: 10 %
LYMPHOCYTES NFR BLD: 14 %
LYMPHOCYTES NFR BLD: 15 %
MACROCYTES BLD QL SMEAR: ABNORMAL
MACROCYTES BLD QL SMEAR: ABNORMAL
MAGNESIUM SERPL-MCNC: 2 MG/DL (ref 1.8–2.4)
MAGNESIUM SERPL-MCNC: 2 MG/DL (ref 1.8–2.4)
MAGNESIUM SERPL-MCNC: 2.2 MG/DL (ref 1.8–2.4)
MCH RBC QN AUTO: 30.9 PG (ref 26–34)
MCH RBC QN AUTO: 31 PG (ref 26–34)
MCH RBC QN AUTO: 31.6 PG (ref 26–34)
MCHC RBC AUTO-ENTMCNC: 32.3 G/DL (ref 31–36)
MCHC RBC AUTO-ENTMCNC: 32.4 G/DL (ref 31–36)
MCHC RBC AUTO-ENTMCNC: 32.7 G/DL (ref 31–36)
MCV RBC AUTO: 94.8 FL (ref 80–100)
MCV RBC AUTO: 95.7 FL (ref 80–100)
MCV RBC AUTO: 97.4 FL (ref 80–100)
METAMYELOCYTES NFR BLD MANUAL: 28 %
METAMYELOCYTES NFR BLD MANUAL: 32 %
METAMYELOCYTES NFR BLD MANUAL: 39 %
METHGB MFR BLDA: 1.4 % (ref 0–1.4)
MICROCYTES BLD QL SMEAR: ABNORMAL
MONOCYTES # BLD: 0.1 K/UL (ref 0–1.3)
MONOCYTES # BLD: 0.2 K/UL (ref 0–1.3)
MONOCYTES # BLD: 0.6 K/UL (ref 0–1.3)
MONOCYTES NFR BLD: 4 %
MONOCYTES NFR BLD: 7 %
MONOCYTES NFR BLD: 9 %
MRSA DNA SPEC QL NAA+PROBE: NORMAL
MUCOUS THREADS #/AREA URNS LPF: ABNORMAL /LPF
MYELOCYTES NFR BLD MANUAL: 1 %
NEUTROPHILS # BLD: 1.9 K/UL (ref 1.7–7.7)
NEUTROPHILS # BLD: 2.1 K/UL (ref 1.7–7.7)
NEUTROPHILS # BLD: 4.8 K/UL (ref 1.7–7.7)
NEUTROPHILS NFR BLD: 23 %
NEUTROPHILS NFR BLD: 30 %
NEUTROPHILS NFR BLD: 35 %
NEUTS BAND NFR BLD MANUAL: 14 % (ref 0–7)
NEUTS BAND NFR BLD MANUAL: 16 % (ref 0–7)
NEUTS BAND NFR BLD MANUAL: 23 % (ref 0–7)
NITRITE UR QL STRIP.AUTO: NEGATIVE
ORGANISM: ABNORMAL
ORGANISM: ABNORMAL
OVALOCYTES BLD QL SMEAR: ABNORMAL
OVALOCYTES BLD QL SMEAR: ABNORMAL
PATH INTERP BLD-IMP: NO
PATH INTERP BLD-IMP: NO
PATH INTERP BLD-IMP: YES
PCO2 BLDA: 32.5 MM HG (ref 35–45)
PCO2 BLDA: 39.6 MMHG (ref 35–45)
PERFORMED ON: ABNORMAL
PH BLDA: 7.24 [PH] (ref 7.35–7.45)
PH BLDA: 7.32 [PH] (ref 7.35–7.45)
PH UR STRIP.AUTO: 6 [PH] (ref 5–8)
PHOSPHATE SERPL-MCNC: 5.9 MG/DL (ref 2.5–4.9)
PHOSPHATE SERPL-MCNC: 7.3 MG/DL (ref 2.5–4.9)
PHOSPHATE SERPL-MCNC: 7.4 MG/DL (ref 2.5–4.9)
PLATELET # BLD AUTO: 146 K/UL (ref 135–450)
PLATELET # BLD AUTO: 149 K/UL (ref 135–450)
PLATELET # BLD AUTO: 211 K/UL (ref 135–450)
PLATELET BLD QL SMEAR: ADEQUATE
PMV BLD AUTO: 9.2 FL (ref 5–10.5)
PMV BLD AUTO: 9.4 FL (ref 5–10.5)
PMV BLD AUTO: 9.6 FL (ref 5–10.5)
PO2 BLDA: 88.6 MM HG (ref 75–108)
PO2 BLDA: 92 MMHG (ref 75–108)
POC SAMPLE TYPE: ABNORMAL
POIKILOCYTOSIS BLD QL SMEAR: ABNORMAL
POIKILOCYTOSIS BLD QL SMEAR: ABNORMAL
POTASSIUM SERPL-SCNC: 4.9 MMOL/L (ref 3.5–5.1)
POTASSIUM SERPL-SCNC: 5.1 MMOL/L (ref 3.5–5.1)
POTASSIUM SERPL-SCNC: 5.8 MMOL/L (ref 3.5–5.1)
PROT SERPL-MCNC: 5.2 G/DL (ref 6.4–8.2)
PROT SERPL-MCNC: 5.2 G/DL (ref 6.4–8.2)
PROT UR STRIP.AUTO-MCNC: >=300 MG/DL
PROTHROMBIN TIME: 29.4 SEC (ref 11.9–14.9)
RBC # BLD AUTO: 2.91 M/UL (ref 4–5.2)
RBC # BLD AUTO: 2.95 M/UL (ref 4–5.2)
RBC # BLD AUTO: 2.99 M/UL (ref 4–5.2)
RBC #/AREA URNS HPF: ABNORMAL /HPF (ref 0–4)
REPORT: NORMAL
REPORT: NORMAL
RESP PATH DNA+RNA PNL L RESP NAA+NON-PRB: ABNORMAL
RESP PATH DNA+RNA PNL L RESP NAA+NON-PRB: ABNORMAL
RESP PATH DNA+RNA PNL NPH NAA+NON-PROBE: NORMAL
SAO2 % BLDA: 94 % (ref 93–100)
SAO2 % BLDA: 96 % (ref 93–100)
SCHISTOCYTES BLD QL SMEAR: ABNORMAL
SCHISTOCYTES BLD QL SMEAR: ABNORMAL
SLIDE REVIEW: ABNORMAL
SODIUM SERPL-SCNC: 131 MMOL/L (ref 136–145)
SODIUM SERPL-SCNC: 132 MMOL/L (ref 136–145)
SODIUM SERPL-SCNC: 136 MMOL/L (ref 136–145)
SP GR UR STRIP.AUTO: 1.02 (ref 1–1.03)
TROPONIN, HIGH SENSITIVITY: 107 NG/L (ref 0–14)
TROPONIN, HIGH SENSITIVITY: 126 NG/L (ref 0–14)
TROPONIN, HIGH SENSITIVITY: 155 NG/L (ref 0–14)
TROPONIN, HIGH SENSITIVITY: 205 NG/L (ref 0–14)
TROPONIN, HIGH SENSITIVITY: 496 NG/L (ref 0–14)
TSH SERPL DL<=0.005 MIU/L-ACNC: 1.02 UIU/ML (ref 0.27–4.2)
UA DIPSTICK W REFLEX MICRO PNL UR: YES
URN SPEC COLLECT METH UR: ABNORMAL
UROBILINOGEN UR STRIP-ACNC: 0.2 E.U./DL
VANCOMYCIN SERPL-MCNC: 22.7 UG/ML
WBC # BLD AUTO: 2.4 K/UL (ref 4–11)
WBC # BLD AUTO: 2.4 K/UL (ref 4–11)
WBC # BLD AUTO: 6.3 K/UL (ref 4–11)
WBC #/AREA URNS HPF: ABNORMAL /HPF (ref 0–5)

## 2024-06-22 PROCEDURE — 36556 INSERT NON-TUNNEL CV CATH: CPT

## 2024-06-22 PROCEDURE — 36415 COLL VENOUS BLD VENIPUNCTURE: CPT

## 2024-06-22 PROCEDURE — 87449 NOS EACH ORGANISM AG IA: CPT

## 2024-06-22 PROCEDURE — 6360000002 HC RX W HCPCS

## 2024-06-22 PROCEDURE — 95717 EEG PHYS/QHP 2-12 HR W/O VID: CPT | Performed by: PSYCHIATRY & NEUROLOGY

## 2024-06-22 PROCEDURE — 37799 UNLISTED PX VASCULAR SURGERY: CPT

## 2024-06-22 PROCEDURE — 5A1D90Z PERFORMANCE OF URINARY FILTRATION, CONTINUOUS, GREATER THAN 18 HOURS PER DAY: ICD-10-PCS | Performed by: INTERNAL MEDICINE

## 2024-06-22 PROCEDURE — 83735 ASSAY OF MAGNESIUM: CPT

## 2024-06-22 PROCEDURE — 99222 1ST HOSP IP/OBS MODERATE 55: CPT | Performed by: STUDENT IN AN ORGANIZED HEALTH CARE EDUCATION/TRAINING PROGRAM

## 2024-06-22 PROCEDURE — 99223 1ST HOSP IP/OBS HIGH 75: CPT | Performed by: INTERNAL MEDICINE

## 2024-06-22 PROCEDURE — 82947 ASSAY GLUCOSE BLOOD QUANT: CPT

## 2024-06-22 PROCEDURE — 99292 CRITICAL CARE ADDL 30 MIN: CPT | Performed by: INTERNAL MEDICINE

## 2024-06-22 PROCEDURE — 2580000003 HC RX 258

## 2024-06-22 PROCEDURE — 80053 COMPREHEN METABOLIC PANEL: CPT

## 2024-06-22 PROCEDURE — 2580000003 HC RX 258: Performed by: EMERGENCY MEDICINE

## 2024-06-22 PROCEDURE — 86901 BLOOD TYPING SEROLOGIC RH(D): CPT

## 2024-06-22 PROCEDURE — 6360000002 HC RX W HCPCS: Performed by: EMERGENCY MEDICINE

## 2024-06-22 PROCEDURE — 85610 PROTHROMBIN TIME: CPT

## 2024-06-22 PROCEDURE — 6370000000 HC RX 637 (ALT 250 FOR IP)

## 2024-06-22 PROCEDURE — 81001 URINALYSIS AUTO W/SCOPE: CPT

## 2024-06-22 PROCEDURE — 84484 ASSAY OF TROPONIN QUANT: CPT

## 2024-06-22 PROCEDURE — 2700000000 HC OXYGEN THERAPY PER DAY

## 2024-06-22 PROCEDURE — 2500000003 HC RX 250 WO HCPCS

## 2024-06-22 PROCEDURE — 0202U NFCT DS 22 TRGT SARS-COV-2: CPT

## 2024-06-22 PROCEDURE — 36430 TRANSFUSION BLD/BLD COMPNT: CPT

## 2024-06-22 PROCEDURE — 82140 ASSAY OF AMMONIA: CPT

## 2024-06-22 PROCEDURE — 84443 ASSAY THYROID STIM HORMONE: CPT

## 2024-06-22 PROCEDURE — 82803 BLOOD GASES ANY COMBINATION: CPT

## 2024-06-22 PROCEDURE — 71045 X-RAY EXAM CHEST 1 VIEW: CPT

## 2024-06-22 PROCEDURE — 5A1D70Z PERFORMANCE OF URINARY FILTRATION, INTERMITTENT, LESS THAN 6 HOURS PER DAY: ICD-10-PCS

## 2024-06-22 PROCEDURE — 83605 ASSAY OF LACTIC ACID: CPT

## 2024-06-22 PROCEDURE — 93005 ELECTROCARDIOGRAM TRACING: CPT

## 2024-06-22 PROCEDURE — 80202 ASSAY OF VANCOMYCIN: CPT

## 2024-06-22 PROCEDURE — 86850 RBC ANTIBODY SCREEN: CPT

## 2024-06-22 PROCEDURE — C8929 TTE W OR WO FOL WCON,DOPPLER: HCPCS

## 2024-06-22 PROCEDURE — 87070 CULTURE OTHR SPECIMN AEROBIC: CPT

## 2024-06-22 PROCEDURE — 93306 TTE W/DOPPLER COMPLETE: CPT | Performed by: INTERNAL MEDICINE

## 2024-06-22 PROCEDURE — 6360000004 HC RX CONTRAST MEDICATION

## 2024-06-22 PROCEDURE — 74018 RADEX ABDOMEN 1 VIEW: CPT

## 2024-06-22 PROCEDURE — 84100 ASSAY OF PHOSPHORUS: CPT

## 2024-06-22 PROCEDURE — 2000000000 HC ICU R&B

## 2024-06-22 PROCEDURE — 99291 CRITICAL CARE FIRST HOUR: CPT | Performed by: INTERNAL MEDICINE

## 2024-06-22 PROCEDURE — 94003 VENT MGMT INPAT SUBQ DAY: CPT

## 2024-06-22 PROCEDURE — 87205 SMEAR GRAM STAIN: CPT

## 2024-06-22 PROCEDURE — P9017 PLASMA 1 DONOR FRZ W/IN 8 HR: HCPCS

## 2024-06-22 PROCEDURE — 89220 SPUTUM SPECIMEN COLLECTION: CPT

## 2024-06-22 PROCEDURE — 70450 CT HEAD/BRAIN W/O DYE: CPT

## 2024-06-22 PROCEDURE — 87633 RESP VIRUS 12-25 TARGETS: CPT

## 2024-06-22 PROCEDURE — 86900 BLOOD TYPING SEROLOGIC ABO: CPT

## 2024-06-22 PROCEDURE — 03HY32Z INSERTION OF MONITORING DEVICE INTO UPPER ARTERY, PERCUTANEOUS APPROACH: ICD-10-PCS

## 2024-06-22 PROCEDURE — 30233K1 TRANSFUSION OF NONAUTOLOGOUS FROZEN PLASMA INTO PERIPHERAL VEIN, PERCUTANEOUS APPROACH: ICD-10-PCS | Performed by: STUDENT IN AN ORGANIZED HEALTH CARE EDUCATION/TRAINING PROGRAM

## 2024-06-22 PROCEDURE — 85025 COMPLETE CBC W/AUTO DIFF WBC: CPT

## 2024-06-22 PROCEDURE — APPNB180 APP NON BILLABLE TIME > 60 MINS

## 2024-06-22 PROCEDURE — 94761 N-INVAS EAR/PLS OXIMETRY MLT: CPT

## 2024-06-22 RX ORDER — FENTANYL CITRATE-0.9 % NACL/PF 10 MCG/ML
25-200 PLASTIC BAG, INJECTION (ML) INTRAVENOUS CONTINUOUS
Status: DISCONTINUED | OUTPATIENT
Start: 2024-06-22 | End: 2024-06-27

## 2024-06-22 RX ORDER — POLYVINYL ALCOHOL 14 MG/ML
1 SOLUTION/ DROPS OPHTHALMIC EVERY 4 HOURS
Status: DISCONTINUED | OUTPATIENT
Start: 2024-06-22 | End: 2024-06-30 | Stop reason: HOSPADM

## 2024-06-22 RX ORDER — PROPOFOL 10 MG/ML
5-50 INJECTION, EMULSION INTRAVENOUS CONTINUOUS
Status: DISCONTINUED | OUTPATIENT
Start: 2024-06-22 | End: 2024-06-24

## 2024-06-22 RX ORDER — LANSOPRAZOLE 30 MG/1
30 TABLET, ORALLY DISINTEGRATING, DELAYED RELEASE ORAL
Status: DISCONTINUED | OUTPATIENT
Start: 2024-06-23 | End: 2024-06-30 | Stop reason: HOSPADM

## 2024-06-22 RX ORDER — SODIUM CHLORIDE 9 MG/ML
INJECTION, SOLUTION INTRAVENOUS PRN
Status: DISCONTINUED | OUTPATIENT
Start: 2024-06-22 | End: 2024-06-24

## 2024-06-22 RX ORDER — SODIUM CHLORIDE 9 MG/ML
INJECTION, SOLUTION INTRAVENOUS PRN
Status: DISCONTINUED | OUTPATIENT
Start: 2024-06-22 | End: 2024-06-22

## 2024-06-22 RX ORDER — LEVOTHYROXINE SODIUM 0.05 MG/1
50 TABLET ORAL DAILY
Status: DISCONTINUED | OUTPATIENT
Start: 2024-06-22 | End: 2024-06-30 | Stop reason: HOSPADM

## 2024-06-22 RX ORDER — SODIUM CHLORIDE 0.9 % (FLUSH) 0.9 %
1.1-1.9 SYRINGE (ML) INJECTION PRN
Status: DISCONTINUED | OUTPATIENT
Start: 2024-06-22 | End: 2024-06-24 | Stop reason: SDUPTHER

## 2024-06-22 RX ORDER — HEPARIN SODIUM 1000 [USP'U]/ML
10000 INJECTION, SOLUTION INTRAVENOUS; SUBCUTANEOUS ONCE
Status: COMPLETED | OUTPATIENT
Start: 2024-06-22 | End: 2024-06-22

## 2024-06-22 RX ORDER — CALCITRIOL 0.25 UG/1
0.5 CAPSULE, LIQUID FILLED ORAL
Status: DISCONTINUED | OUTPATIENT
Start: 2024-06-24 | End: 2024-06-30 | Stop reason: HOSPADM

## 2024-06-22 RX ORDER — SODIUM CHLORIDE 9 MG/ML
INJECTION, SOLUTION INTRAVENOUS CONTINUOUS
Status: DISCONTINUED | OUTPATIENT
Start: 2024-06-22 | End: 2024-06-23

## 2024-06-22 RX ORDER — PANTOPRAZOLE SODIUM 40 MG/1
40 TABLET, DELAYED RELEASE ORAL
Status: DISCONTINUED | OUTPATIENT
Start: 2024-06-22 | End: 2024-06-22

## 2024-06-22 RX ORDER — FOLIC ACID 1 MG/1
1 TABLET ORAL DAILY
Status: DISCONTINUED | OUTPATIENT
Start: 2024-06-22 | End: 2024-06-30 | Stop reason: HOSPADM

## 2024-06-22 RX ADMIN — PROPOFOL 15 MCG/KG/MIN: 10 INJECTION, EMULSION INTRAVENOUS at 08:22

## 2024-06-22 RX ADMIN — NOREPINEPHRINE BITARTRATE 55 MCG/MIN: 1 INJECTION, SOLUTION, CONCENTRATE INTRAVENOUS at 06:20

## 2024-06-22 RX ADMIN — NOREPINEPHRINE BITARTRATE 75 MCG/MIN: 1 INJECTION, SOLUTION, CONCENTRATE INTRAVENOUS at 23:02

## 2024-06-22 RX ADMIN — EPINEPHRINE 0.1 MCG/KG/MIN: 1 INJECTION INTRAMUSCULAR; INTRAVENOUS; SUBCUTANEOUS at 11:02

## 2024-06-22 RX ADMIN — EPINEPHRINE 0.1 MCG/KG/MIN: 1 INJECTION INTRAMUSCULAR; INTRAVENOUS; SUBCUTANEOUS at 17:55

## 2024-06-22 RX ADMIN — HEPARIN SODIUM 10000 UNITS: 1000 INJECTION INTRAVENOUS; SUBCUTANEOUS at 23:03

## 2024-06-22 RX ADMIN — HEPARIN SODIUM 5000 UNITS: 5000 INJECTION INTRAVENOUS; SUBCUTANEOUS at 05:46

## 2024-06-22 RX ADMIN — SODIUM CHLORIDE, PRESERVATIVE FREE 10 ML: 5 INJECTION INTRAVENOUS at 09:51

## 2024-06-22 RX ADMIN — ACETAMINOPHEN 650 MG: 325 TABLET ORAL at 08:05

## 2024-06-22 RX ADMIN — Medication 25 MCG/HR: at 00:57

## 2024-06-22 RX ADMIN — PROPOFOL 10 MCG/KG/MIN: 10 INJECTION, EMULSION INTRAVENOUS at 02:01

## 2024-06-22 RX ADMIN — EPINEPHRINE 0.2 MCG/KG/MIN: 1 INJECTION INTRAMUSCULAR; INTRAVENOUS; SUBCUTANEOUS at 22:19

## 2024-06-22 RX ADMIN — PIPERACILLIN AND TAZOBACTAM 4500 MG: 4; .5 INJECTION, POWDER, LYOPHILIZED, FOR SOLUTION INTRAVENOUS at 21:46

## 2024-06-22 RX ADMIN — Medication 75 MCG/HR: at 13:04

## 2024-06-22 RX ADMIN — POLYVINYL ALCOHOL 1 DROP: 1.4 SOLUTION/ DROPS OPHTHALMIC at 13:14

## 2024-06-22 RX ADMIN — POLYVINYL ALCOHOL 1 DROP: 1.4 SOLUTION/ DROPS OPHTHALMIC at 17:13

## 2024-06-22 RX ADMIN — VASOPRESSIN 0.03 UNITS/MIN: 20 INJECTION INTRAVENOUS at 21:26

## 2024-06-22 RX ADMIN — PERFLUTREN 1.5 ML: 6.52 INJECTION, SUSPENSION INTRAVENOUS at 09:16

## 2024-06-22 RX ADMIN — FOLIC ACID 1 MG: 1 TABLET ORAL at 09:52

## 2024-06-22 RX ADMIN — PROPOFOL 15 MCG/KG/MIN: 10 INJECTION, EMULSION INTRAVENOUS at 21:43

## 2024-06-22 RX ADMIN — EPINEPHRINE 0.1 MCG/KG/MIN: 1 INJECTION INTRAMUSCULAR; INTRAVENOUS; SUBCUTANEOUS at 04:16

## 2024-06-22 RX ADMIN — SODIUM BICARBONATE: 84 INJECTION, SOLUTION INTRAVENOUS at 00:07

## 2024-06-22 RX ADMIN — PIPERACILLIN AND TAZOBACTAM 4500 MG: 4; .5 INJECTION, POWDER, LYOPHILIZED, FOR SOLUTION INTRAVENOUS at 09:50

## 2024-06-22 RX ADMIN — NOREPINEPHRINE BITARTRATE 55 MCG/MIN: 1 INJECTION, SOLUTION, CONCENTRATE INTRAVENOUS at 11:54

## 2024-06-22 RX ADMIN — NOREPINEPHRINE BITARTRATE 55 MCG/MIN: 1 INJECTION, SOLUTION, CONCENTRATE INTRAVENOUS at 17:49

## 2024-06-22 RX ADMIN — EPINEPHRINE 0.3 MCG/KG/MIN: 1 INJECTION INTRAMUSCULAR; INTRAVENOUS; SUBCUTANEOUS at 00:44

## 2024-06-22 RX ADMIN — ACETAMINOPHEN 650 MG: 325 TABLET ORAL at 18:06

## 2024-06-22 RX ADMIN — POLYVINYL ALCOHOL 1 DROP: 1.4 SOLUTION/ DROPS OPHTHALMIC at 19:41

## 2024-06-22 RX ADMIN — PROPOFOL 15 MCG/KG/MIN: 10 INJECTION, EMULSION INTRAVENOUS at 15:25

## 2024-06-22 RX ADMIN — MAGNESIUM SULFATE HEPTAHYDRATE 2000 MG: 40 INJECTION, SOLUTION INTRAVENOUS at 00:14

## 2024-06-22 RX ADMIN — LEVOTHYROXINE SODIUM 50 MCG: 50 TABLET ORAL at 09:52

## 2024-06-22 RX ADMIN — HEPARIN SODIUM 5000 UNITS: 5000 INJECTION INTRAVENOUS; SUBCUTANEOUS at 21:31

## 2024-06-22 ASSESSMENT — PULMONARY FUNCTION TESTS
PIF_VALUE: 24
PIF_VALUE: 18
PIF_VALUE: 22
PIF_VALUE: 12
PIF_VALUE: 18
PIF_VALUE: 18
PIF_VALUE: 22
PIF_VALUE: 18
PIF_VALUE: 18
PIF_VALUE: 11
PIF_VALUE: 18
PIF_VALUE: 19
PIF_VALUE: 24
PIF_VALUE: 18
PIF_VALUE: 19
PIF_VALUE: 18
PIF_VALUE: 23
PIF_VALUE: 18
PIF_VALUE: 19
PIF_VALUE: 18
PIF_VALUE: 18
PIF_VALUE: 24
PIF_VALUE: 21
PIF_VALUE: 24
PIF_VALUE: 18
PIF_VALUE: 18
PIF_VALUE: 22
PIF_VALUE: 18
PIF_VALUE: 25
PIF_VALUE: 23
PIF_VALUE: 18
PIF_VALUE: 17
PIF_VALUE: 18
PIF_VALUE: 19
PIF_VALUE: 32
PIF_VALUE: 18
PIF_VALUE: 24
PIF_VALUE: 18
PIF_VALUE: 19
PIF_VALUE: 18
PIF_VALUE: 17
PIF_VALUE: 18
PIF_VALUE: 18
PIF_VALUE: 19
PIF_VALUE: 18
PIF_VALUE: 12
PIF_VALUE: 23
PIF_VALUE: 18
PIF_VALUE: 19
PIF_VALUE: 23
PIF_VALUE: 18
PIF_VALUE: 25
PIF_VALUE: 13
PIF_VALUE: 23
PIF_VALUE: 19
PIF_VALUE: 25
PIF_VALUE: 18
PIF_VALUE: 20
PIF_VALUE: 18
PIF_VALUE: 19
PIF_VALUE: 18
PIF_VALUE: 20
PIF_VALUE: 18
PIF_VALUE: 20
PIF_VALUE: 18
PIF_VALUE: 18
PIF_VALUE: 19
PIF_VALUE: 18

## 2024-06-22 ASSESSMENT — PAIN SCALES - GENERAL
PAINLEVEL_OUTOF10: 0

## 2024-06-22 NOTE — PROGRESS NOTES
Spoke with patient's appointed legal guardian, Liza Quiles, over the phone. [facility]. Updated her on Ms. Colby's current status. Liza stated that the patient's wishes would reflect not pursuing further resuscitative efforts if she were to go into cardiac arrest again. Her being intubated currently is OK. She also reiterated the patient's desire to avoid hemodialysis if it ever becomes necessary.    Please contact Ms. Lorenzanaarejosemanuel Quiles for medical decision making questions moving forward.     Emmett Shane, DO  Internal Medicine, PGY-1  06/22/24  1:07 PM

## 2024-06-22 NOTE — CONSULTS
Clinical Pharmacy Consult Note  Medication History     Admit Date: 6/21/2024    List of of current medications patient is taking is complete. Home med list was adjusted yesterday by Pharmacy.  Please see note copied below.     Thank you,   Milvia Fields PharmD., BCPS   6/22/2024 8:40 AM  Wireless: [phone]        Source of information: Efren Singleton at [facility]    Patient's home pharmacy: [facility] [address] [phone] -  [phone]      Changes made to medication list:   Medications removed: (include reason, ex: therapy completed, patient no longer taking, etc.)  Vitamin D3  Imodium  Losartan  Olanzapine  Miralax  Sertraline  Medications added:   Tylenol OTC  Labetalol  Methocarbamol  Retacrit  Robitussin  Torsemide  Veltassa  Medication doses adjusted:   Tylenol 1000 mg Q6H PRN (NOT Q8H PRN)  Amlodipine 10 mg daily (not 5 mg daily)  Atorvastatin 20 mg daily (not 10 mg daily)  Calcitriol 0.5 mg every Monday, Wednesday, and Friday (not daily)  Docusate 200 mg daily (not 100 mg BID)  Ferrous sulfate 325 mg once daily (not 325 mg TID)  Sodium Bicarbonate 1,300 mg TID (not 650 mg QID)  Other notes:   None    Current Outpatient Medications   Medication Instructions    acetaminophen (TYLENOL) 1,000 mg, Oral, EVERY 6 HOURS PRN, (for right knee pain. Do not exceed 4 grams in 24 hours)    acetaminophen (TYLENOL) 650 mg, Oral, EVERY 8 HOURS PRN    amLODIPine (NORVASC) 10 mg, Oral, DAILY    atorvastatin (LIPITOR) 20 mg, Oral, DAILY    calcitRIOL (ROCALTROL) 0.5 MCG capsule Take one capsule by mouth on Monday, Wednesday, and Friday    Dextromethorphan-guaiFENesin  MG/5ML SYRP 10 mLs, Oral, EVERY 6 HOURS PRN    docusate sodium (COLACE) 200 mg, Oral, DAILY    ferrous sulfate (IRON 325) 325 mg, Oral, DAILY WITH BREAKFAST    folic acid (FOLVITE) 1 mg, Oral, DAILY    labetalol (NORMODYNE) 300 mg, Oral, 2 TIMES DAILY    levothyroxine (SYNTHROID) 50 mcg, Oral, DAILY     methocarbamol (ROBAXIN) 500 mg, Oral, 3 TIMES DAILY    Patiromer Sorbitex Calcium (VELTASSA) 16.8 g PACK 1 packet, Oral, DAILY    Retacrit 20,000 Units, SubCUTAneous, EVERY 14 DAYS, Every other Tuesday.  Hold if HGB >11 or BP > 180/100    sodium bicarbonate 650 MG tablet 2 tablets, Oral, 3 TIMES DAILY    torsemide (DEMADEX) 20 MG tablet 2 tablets, Oral, DAILY       Please call with any questions.    Paul Horan, Pharmacy Intern

## 2024-06-22 NOTE — PROGRESS NOTES
Headband: applied  Date/Time: 6/21/24 2344  Recorder: recording started  Skin: intact  Highest Seizure [place] Percentage past hour: N/A          General info regarding Seizure [place] %:  Minimum duration of study is 2 hours. If Seizure [place] has remained 0% throughout the entire 2-hour duration, communicate with provider to stop the recording.     Seizure [place] 0-10% - Continue to monitor and complete 2-hour study.  Seizure [place] 11-89% - Epileptiform activity present. Notify provider for next steps.  Seizure [place] >/= 90% - Epileptiform activity consistent with Status Epilepticus. Immediately notify provider.     *Patients with Seizure [place] above 10% that persists may require a study longer than 2 hours. Maximum recording duration is 24 hours. Please update provider with a persistent increase in Seizure [place] above 10%.

## 2024-06-22 NOTE — PLAN OF CARE
Problem: Safety - Adult  Goal: Free from fall injury  Outcome: Progressing  Note: Fall risk protocol in place: Bed alarm on, fall risk bracelet applied, yellow indicator in hallway on, non-skid footwear in use.  Patient/family educated on fall risk protocol, instructed to call for assistance when needed.     Problem: Safety - Medical Restraint  Goal: Remains free of injury from restraints (Restraint for Interference with Medical Device)  Description: INTERVENTIONS:  1. Determine that other, less restrictive measures have been tried or would not be effective before applying the restraint  2. Evaluate the patient's condition at the time of restraint application  3. Inform patient/family regarding the reason for restraint  4. Q2H: Monitor safety, psychosocial status, comfort, nutrition and hydration  Outcome: Progressing  Flowsheets  Taken 6/22/2024 1800  Remains free of injury from restraints (restraint for interference with medical device):   Determine that other, less restrictive measures have been tried or would not be effective before applying the restraint   Evaluate the patient's condition at the time of restraint application   Inform patient/family regarding the reason for restraint   Every 2 hours: Monitor safety, psychosocial status, comfort, nutrition and hydration  Taken 6/22/2024 1600  Remains free of injury from restraints (restraint for interference with medical device):   Determine that other, less restrictive measures have been tried or would not be effective before applying the restraint   Evaluate the patient's condition at the time of restraint application   Inform patient/family regarding the reason for restraint   Every 2 hours: Monitor safety, psychosocial status, comfort, nutrition and hydration  Taken 6/22/2024 1400  Remains free of injury from restraints (restraint for interference with medical device):   Determine that other, less restrictive measures have been tried or would not be  effective before applying the restraint   Evaluate the patient's condition at the time of restraint application   Inform patient/family regarding the reason for restraint   Every 2 hours: Monitor safety, psychosocial status, comfort, nutrition and hydration  Taken 6/22/2024 1200  Remains free of injury from restraints (restraint for interference with medical device):   Determine that other, less restrictive measures have been tried or would not be effective before applying the restraint   Evaluate the patient's condition at the time of restraint application   Inform patient/family regarding the reason for restraint   Every 2 hours: Monitor safety, psychosocial status, comfort, nutrition and hydration  Taken 6/22/2024 1000  Remains free of injury from restraints (restraint for interference with medical device):   Determine that other, less restrictive measures have been tried or would not be effective before applying the restraint   Evaluate the patient's condition at the time of restraint application   Inform patient/family regarding the reason for restraint   Every 2 hours: Monitor safety, psychosocial status, comfort, nutrition and hydration  Taken 6/22/2024 0800  Remains free of injury from restraints (restraint for interference with medical device):   Determine that other, less restrictive measures have been tried or would not be effective before applying the restraint   Evaluate the patient's condition at the time of restraint application   Inform patient/family regarding the reason for restraint   Every 2 hours: Monitor safety, psychosocial status, comfort, nutrition and hydration  Note: Critical airway in place, so BL soft wrist restraints maintained for patient safety.  Patient checked and tended to at intervals per protocol, see flowsheets.     Problem: Skin/Tissue Integrity  Goal: Absence of new skin breakdown  Description: 1.  Monitor for areas of redness and/or skin breakdown  2.  Assess vascular access  sites hourly  3.  Every 4-6 hours minimum:  Change oxygen saturation probe site  4.  Every 4-6 hours:  If on nasal continuous positive airway pressure, respiratory therapy assess nares and determine need for appliance change or resting period.  Outcome: Progressing  Note: Patient unable to turn self adequately in bed, so pillow supports in use with repositioning every two hours. Sacral Heart Mepilex in place to protect, skin intact underneath.

## 2024-06-22 NOTE — CONSULTS
Neurocritical Care Consult Note      Patient: Marcela Colby MRN: [id number]    YOB: 1960  Age: 64 y.o.  Sex: female   Unit: [facility] ICU TOWER  Room/Bed: 4514/4514-01 Location: [facility]    Date of Consultation: 6/22/2024  Date of Admission: 6/21/2024  4:12 PM ( LOS: 1 day )  Primary Care Physician: Thalia Au MD   Consult Requested By: Maria Del Rosario Smith, *    Reason for Consult: concern for seizure vs myoclonus 2/2 anoxic brain injury s/p cardiac arrest; has cerebel on     IMPRESSION & RECOMMENDATIONS     IMPRESSION:   64 y.o. y/o female with history significant for HLD, HTN, venous thrombosis and embolism, vascular dementia, CKD stage 5, pancytopenia, renal osteodystrophy, anemia, hypothyroidism, depression, and anxiety who presents to the [facility] from [facility] with chief complaint of respiratory distress. She came to the ER earlier in the morning for right  leg and right foot pain and swelling that had been ongoing for the past several days, was ultimately discharged after undergoing DVT ultrasound and x-ray imaging of the right lower extremity which were both unremarkable.  Her labs were significant at that time for leukopenia of 1.4 and a creatinine of 5 with a BUN of 61.    Later in the afternoon, patient represented back to the ER for acute respiratory distress that required the use of CPAP en route to the hospital.  She was found to be febrile to 102.3, tachycardic to 108 bpm, was mildly hypertensive to 147/114 and had a respiratory rate of 42 bpm.  Her lactic acid level was 4.4.  She was started on noninvasive positive pressure ventilation and broad-spectrum antibiotics for concerns of sepsis secondary to right lower extremity cellulitis.  She did have a CT of the right lower extremity which was unremarkable.      Prior to being admitted to the hospital, patient was found to be in PEA cardiac arrest.  She underwent multiple rounds of CPR,  Every other Tuesday.  Hold if HGB >11 or BP > 180/100    sodium bicarbonate 650 MG tablet 2 tablets, Oral, 3 TIMES DAILY    torsemide (DEMADEX) 20 MG tablet 2 tablets, Oral, DAILY       Physical Exam   PHYSICAL EXAM:  Vitals:    06/21/24 2330 06/21/24 2345 06/22/24 0000 06/22/24 0015   BP: (!) 129/54 (!) 130/54 114/79    Pulse: (!) 118 (!) 119 (!) 119 (!) 115   Resp: 27 29 30 30   Temp:  100 °F (37.8 °C)     TempSrc:  Bladder     SpO2: 96% 94% 98% 97%   Weight:             General: Alert, intubated, restless, ill appearing,   Neurologic  Mental status:   Patient is intubated, with her eyes open.  She does appear to be restless.  She does have spontaneous movement of all 4 extremities.  She flexes her arms and continues to have intermittent jerking of her lower extremities.  She is following commands. She was able to squeeze my fingers bilaterally.  She was also able to give thumbs up bilaterally.  When I spoke with her she nodded while on the ventilator.  She wiggled her toes to command.     Cranial nerves:   CN2: Blinked to threat  CN 3,4,6: Pupils 3 mm> 2 mm equal and reactive to light, extraocular muscles intact w/ VOR, gaze slightly dysconjugate.  CN5: + corneal reflexes bilaterally  CN7: Face symmetric at rest, exam limited given ETT>  CN8: Hearing symmetric to spoken voice  CN9: + cough/gag reflex with in line suctioning.     Motor Exam:  Noted to have spontaneous movement in all 4 extremities.  Her upper extremity flexion at times appeared purposeful as if she was reaching for her ET tube, however most of the movement she was noted to have was non purposeful. She followed commands in all 4 extremities.  She would wiggle her toes bilaterally, squeeze my fingers bilaterally and gave thumbs up bilaterally.  She was moving all 4 limbs with decent strength and no apparent asymmetric weakness.  Arms were able to be lifted antigravity off the bed and both legs were a 3- out of 5      Sensory: Flexes to pain in

## 2024-06-22 NOTE — PROGRESS NOTES
Headband: removed  Date/Time: 06/22/24 0141  Recorder: recording stopped  Skin: intact  Highest Seizure [place] Percentage past hour: 0%      General info regarding Seizure [place] %:  Minimum duration of study is 2 hours. If Seizure [place] has remained 0% throughout the entire 2-hour duration, communicate with provider to stop the recording.     Seizure [place] 0-10% - Continue to monitor and complete 2-hour study.  Seizure [place] 11-89% - Epileptiform activity present. Notify provider for next steps.  Seizure [place] >/= 90% - Epileptiform activity consistent with Status Epilepticus. Immediately notify provider.     *Patients with Seizure [place] above 10% that persists may require a study longer than 2 hours. Maximum recording duration is 24 hours. Please update provider with a persistent increase in Seizure [place] above 10%.

## 2024-06-22 NOTE — CONSULTS
acetaminophen (TYLENOL) 325 MG tablet Take 2 tablets by mouth every 8 hours as needed for Pain (do not exceed 4 grams in 24 hours)      labetalol (NORMODYNE) 300 MG tablet Take 1 tablet by mouth 2 times daily      methocarbamol (ROBAXIN) 500 MG tablet Take 1 tablet by mouth 3 times daily      Dextromethorphan-guaiFENesin  MG/5ML SYRP Take 10 mLs by mouth every 6 hours as needed for Cough      torsemide (DEMADEX) 20 MG tablet Take 2 tablets by mouth daily      Patiromer Sorbitex Calcium (VELTASSA) 16.8 g PACK Take 1 packet by mouth daily      Epoetin Jaron-epbx (RETACRIT) 68754 UNIT/ML SOLN injection Inject 1 mL into the skin every 14 days Every other Tuesday.  Hold if HGB >11 or BP > 180/100      ferrous sulfate 325 (65 Fe) MG tablet Take 1 tablet by mouth daily (with breakfast)      amLODIPine (NORVASC) 10 MG tablet Take 1 tablet by mouth daily      calcitRIOL (ROCALTROL) 0.5 MCG capsule Take one capsule by mouth on Monday, Wednesday, and Friday      sodium bicarbonate 650 MG tablet Take 2 tablets by mouth 3 times daily      atorvastatin (LIPITOR) 20 MG tablet Take 1 tablet by mouth daily      docusate sodium (COLACE) 100 MG capsule Take 2 capsules by mouth daily      folic acid (FOLVITE) 1 MG tablet Take 1 tablet by mouth daily      levothyroxine (SYNTHROID) 50 MCG tablet Take 1 tablet by mouth Daily      acetaminophen (TYLENOL) 500 MG tablet Take 2 tablets by mouth every 6 hours as needed (for right knee pain. Do not exceed 4 grams in 24 hours)         Family History:   Family History   Problem Relation Age of Onset    Hypertension Mother     Hypertension Father     Hypertension Sister        Social History:   TOBACCO:   reports that she quit smoking about 13 years ago. Her smoking use included cigarettes. She started smoking about 25 years ago. She has a 12.0 pack-year smoking history. She has never used smokeless tobacco.  ETOH:   reports no history of alcohol use.  DRUGS:   reports no history of drug  use.    Review of Systems:   A five point review of systems was completed, which was unremarkable.    Physical exam:   Vitals:    06/22/24 1815 06/22/24 1830 06/22/24 1845 06/22/24 1900   BP:       Pulse: 90 90 88 93   Resp: 23 25 20 22   Temp:    (!) 100.8 °F (38.2 °C)   TempSrc:    Bladder   SpO2:   96%    Weight:       Height:           General appearance: Alert, no acute distress, grooming appropriate  Eyes: PERRLA, EOMI, no scleral icterus  Neck: Trachea midline, no JVD  Chest/Lungs: Normal inspiratory effort, symmetric chest rise, no accessory muscle use  Cardiovascular: Regular rate and rhythm; pulse exam detailed below  Abdomen: Soft, non-tender, non-distended, no guarding/rigidity  Skin: Warm and dry, no rashes  Extremities: No edema, no cyanosis  Neuro: A&Ox3, no gross sensory or motor neuro deficits        Assessment/Plan:  Marcela Colby is a 64 y.o. female who was admitted for Cardiac arrest (HCC) [I46.9]  Septicemia (HCC) [A41.9]  Cellulitis of right lower extremity [L03.115]  Acute renal failure, unspecified acute renal failure type (HCC) [N17.9] on 6/21/2024.  Our service was consulted for assistance with line placement.    - Plan for bedside placement of a non-tunneled right IJ temporary hemodialysis catheter. (Groin already accessed)  - 1U FFP to be administered at time of placement  - Consent unable to be obtained at this time due to PoA unavailability; will be placed once able to contact  - Procedure note to follow once line is placed.  - Patient and case discussed with surgical team and on-call surgical staff Dr. Vang.    Jesús Kincaid DO  PGY2, General Surgery  06/22/24  7:48 PM  [employer]  Pager: [phone]

## 2024-06-22 NOTE — PROGRESS NOTES
Pt received to ICU 14 from ED. No sedation present. GCS 3. Sluggish pupillary response, absent corneals, very weak cough/gag, over breathing vent, does not respond to any noxious stimuli to extremities. Nonpurposeful jerking movements to head and all extremities- ICU team aware. Dressings changed to arterial and CVC lines. Temp sensing caldwell applied, scant UOP- MD aware. Low grade fevers present. OGT inserted without difficulties. CHG bath given. Multiple open and closed blisters throughout RLE, large amount of drainage- dry dressings applied. Levophed and vasopressin added in addition to epinephrine. MD attempts to reach family unsuccessful. Per ICU team, nephro and cardiology consults routine for AM. ST per monitor. Tolerating vent settings. Labs drawn. Will cont to monitor.

## 2024-06-22 NOTE — PROCEDURES
PROCEDURE NOTE  Date: 6/22/2024   Name: Marcela Colby  YOB: 1960    Insert Arterial Line    Date/Time: 6/22/2024 2:31 AM    Performed by: Guanako Hammer DO  Authorized by: Guanako Hammer DO  Consent: The procedure was performed in an emergent situation. Verbal consent not obtained. Written consent not obtained.  Risks and benefits: risks, benefits and alternatives were discussed  Required items: required blood products, implants, devices, and special equipment available  Patient identity confirmed: arm band and hospital-assigned identification number  Time out: Immediately prior to procedure a \"time out\" was called to verify the correct patient, procedure, equipment, support staff and site/side marked as required.  Preparation: Patient was prepped and draped in the usual sterile fashion.  Indications: hemodynamic monitoring  Location: right radial    Anesthesia:  Local Anesthetic: lidocaine 1% without epinephrine    Sedation:  Patient sedated: yes  Sedatives: see MAR for details  Analgesia: see MAR for details    Jose Martin's test normal: yes  Needle gauge: 20  Seldinger technique: Seldinger technique used  Cutdown: cutdown required  Number of attempts: 2  Comments: Unsuccessful. EBL <5 mL

## 2024-06-22 NOTE — CONSULTS
[facility] Consult Note           Reason for Consult:  LUIS/ CKD IV    History Obtained From:  patient, electronic medical record    History of Present Ilness:    Patient with past medical history significant for chronic kidney disease stage IV, hypertension, DVT who presented from nursing home with right foot pain associated with lower extremity swelling.  Patient is currently intubated.  History was obtained through reviewing the chart.  Patient was noted to be hypoxemic placed initially on CPAP on the way to the emergency room.  In the emergency room patient was switched to BiPAP.  Underwent PEA arrest went through several rounds of CPR, and 5 A of epinephrine.  After ROSC was achieved patient was noted to be hypotensive with MAP in the low 60s and was started on epi drip.    Past Medical History:        Diagnosis Date    Anemia     Anxiety     Atelectasis 3/29/2017    CKD (chronic kidney disease)     Clotting disorder (HCC)     Constipation     Depression     Hyperlipidemia     Hypertension     Muscle weakness     Pancytopenia (HCC)     Paranoid schizophrenia (HCC)     Personal history of venous thrombosis and embolism     Thrombocytopenia (HCC)     Thyroid disease     hypothyroidism    Vascular dementia (HCC)        Past Surgical History:        Procedure Laterality Date    DILATION AND CURETTAGE OF UTERUS  03/01/2017    hysterscope and myosure ablation    HYSTERECTOMY (CERVIX STATUS UNKNOWN)      HAILY AND BSO (CERVIX REMOVED)  04/17/2017    Computer-assisted tele-manipulated total hysterectomy, bilateral salpingo-oophorectomy with suture    TUBAL LIGATION  1984       Home Medications:    No current facility-administered medications on file prior to encounter.     Current Outpatient Medications on File Prior to Encounter   Medication Sig Dispense Refill    acetaminophen (TYLENOL) 325 MG tablet Take 2 tablets by mouth every 8 hours as needed for Pain (do not exceed  06/22/2024 06:17 AM    CL 96 06/22/2024 06:17 AM    CO2 16 06/22/2024 06:17 AM    BUN 84 06/22/2024 06:17 AM    CREATININE 6.6 06/22/2024 06:17 AM    CALCIUM 7.7 06/22/2024 06:17 AM    GFRAA 20 06/18/2020 05:58 PM    LABGLOM 6 06/22/2024 06:17 AM    LABGLOM 11 03/14/2024 03:07 PM    GLUCOSE 148 06/22/2024 06:17 AM    GLUCOSE 91 06/15/2017 12:12 PM         Assessment/  Acute kidney injury stage III likely ischemic ATN in the setting of prolonged PEA arrest  -Oliguric had only 160 mL output charted since midnight.  Chronic kidney disease stage V secondary to hypertensive nephrosclerosis creatinine baseline around 4.7 mg/dL.  Following with Dr. Box  -Per previous documentation patient is not interested in renal replacement therapy.  S/p PEA arrest requiring several rounds of CPR  Acute hypoxic respiratory failure  Shock  Shock liver  Hyperphosphatemia  Hypocalcemia  High anion gap metabolic acidosis  History of hypertension now hypotensive  Anemia  Plan/  -I discussed with the patient's 2 daughters at bedside.  They were adamant that we would proceed with renal replacement cystoscopy once indicated.  I discussed with him about patient's previous wishes of not proceeding with dialysis and although they acknowledge her previous wishes they would still like to continue with aggressive measurers.  With her multiorgan failure, 3 pressor support and requiring high vent support overall prognosis is poor.  Will discuss with our colleagues in critical care about renal replacement therapy  -Goal MAP above 65 mmHg  -Avoid nephrotoxins  -Adjust all medication for EGFR    Thank you for the consultation.  Please do not hesitate to call with questions.    ____________________________________  Chuy Gutierrez MD  [facility]  [email or web address]  Office: [phone]

## 2024-06-22 NOTE — ED PROVIDER NOTES
[facility]  EMERGENCY DEPARTMENT ENCOUNTER          EM RESIDENT NOTE       Date of evaluation: 6/21/2024    Procedures     Arterial Line    Date/Time: 6/21/2024 8:03 PM    Performed by: Braxton Hernandez MD  Authorized by: Ryan Mitchell MD    Consent:     Consent obtained:  Emergent situation  Universal protocol:     Patient identity confirmed:  Anonymous protocol, patient vented/unresponsive  Indications:     Indications: hemodynamic monitoring and multiple ABGs    Pre-procedure details:     Skin preparation:  Chlorhexidine  Procedure details:     Location:  L femoral    Needle gauge:  20 G    Placement technique:  Seldinger    Number of attempts:  2    Transducer: waveform confirmed    Post-procedure details:     Post-procedure:  Sutured    CMS:  Normal    Procedure completion:  Tolerated well, no immediate complications  Comments:      Crash arterial line, placed during compressions by anatomic landmarks  Initial line was venous, second line arterial  Venous line removed and pressure held for 5 minutes  Central Line    Date/Time: 6/21/2024 8:03 PM    Performed by: Braxton Hernandez MD  Authorized by: Ryan Mitchell MD    Consent:     Consent obtained:  Emergent situation  Universal protocol:     Patient identity confirmed:  Anonymous protocol, patient vented/unresponsive  Pre-procedure details:     Indication(s): central venous access      Hand hygiene: Hand hygiene performed prior to insertion      Sterile barrier technique: All elements of maximal sterile technique followed      Skin preparation:  Chlorhexidine    Skin preparation agent: Skin preparation agent completely dried prior to procedure    Procedure details:     Location:  R femoral    Site selection rationale:  Cardiac arrest, may require CRRT during admission for renal failure    Patient position:  Supine    Procedural supplies:  Triple lumen    Catheter size:  7 Fr    Ultrasound guidance: yes      Ultrasound guidance timing: real

## 2024-06-22 NOTE — PROGRESS NOTES
4 Eyes Skin Assessment     NAME:  Marcela Colby  YOB: 1960  MEDICAL RECORD NUMBER:  [id number]    The patient is being assessed for  Admission    I agree that at least one RN has performed a thorough Head to Toe Skin Assessment on the patient. ALL assessment sites listed below have been assessed.      Areas assessed by both nurses:    Head, Face, Ears, Shoulders, Back, Chest, Arms, Elbows, Hands, Sacrum. Buttock, Coccyx, Ischium, Legs. Feet and Heels, and Under Medical Devices         Does the Patient have a Wound? Yes wound(s) were present on assessment. LDA wound assessment was Initiated and completed by RN  Multiple tears/open blisters and closed blisters to RLE. Nonadherent applied, ABD and roll gauze. Wound care consult pending.        Reed Prevention initiated by RN: Yes  Wound Care Orders initiated by RN: No    Pressure Injury (Stage 3,4, Unstageable, DTI, NWPT, and Complex wounds) if present, place Wound referral order by RN under : No    New Ostomies, if present place, Ostomy referral order under : No     Nurse 1 eSignature: Electronically signed by Ally Cash RN on 6/22/24 at 6:12 AM EDT    **SHARE this note so that the co-signing nurse can place an eSignature**    Nurse 2 eSignature: Electronically signed by Moon Gandhi RN on 6/22/24 at 12:14 PM EDT

## 2024-06-22 NOTE — H&P
ICU H&P      Hospital Day:   ICU Day:                                                          Code:No Order  Admit Date: 6/21/2024  PCP: Thalia Au MD                                  CC: respiratory distress, s/p cardiac arrest    HISTORY OF PRESENT ILLNESS:   Ms. Marcela Colby is a 64 year old female with PMHx ofHLD, HTN, venous thrombosis and embolism, Vascular dementia, CKD who presented from nursing home for right foot and lower leg pain and swelling. As per chart review, patient had noted the pain and swelling first around 7 PM last night and does not remember any specific injury or trauma to the area. Lower extremity doppler unremarkable for DVT.    Patient had stated that she was feeling short of breath in the nursing home. Patient was found to be hypoxic. Patient was placed on CPAP on the way to the ED.    When patient presented to the ED, vital signs were BP of 147/114, temp 102.3, pulse 108, RR 42 and spO2 100%.  BMP remarkable for hyponatremia to 131, potassium of 5.4 which was hemolyzed.  Prior potassium was 4.4.  Carbon dioxide of 14, BUN elevated to 70, creatinine elevated to 5.9, anion gap elevated to 20.  CBC remarkable for leukopenia of 3.4, hemoglobin of 9.9.  Patient was switched to BiPAP and was noted to have rhonchorous breath sounds. CXR revealed b/l infrahilar atelectasis or pneumonia. VBG revealed metabolic acidosis. LA elevated to 4.4.  proBNP elevated to 13,629.  Troponins elevated to 45.  Patient underwent cardiac arrest with initial rhythm to be noted of PEA.  She went through several rounds of CPR and was given calcium, 2 A of bicarb, 5+ epi.  ROSC was achieved with patient's MAP in the low 60s for which an epi drip was initiated.  Chest x-ray confirmed to position.  Patient was then transferred to the ICU for further care and management.    PAST HISTORY:     Past Medical History:   Diagnosis Date    Anemia     Anxiety     Atelectasis 3/29/2017    CKD (chronic kidney disease)   mg by mouth daily       acetaminophen (TYLENOL) 500 MG tablet Take 500 mg by mouth every 8 hours as needed for Pain       losartan (COZAAR) 50 MG tablet Take 50 mg by mouth daily           Scheduled Meds:   vancomycin  2,000 mg IntraVENous Once    lactated ringers  500 mL IntraVENous Once    lactated ringers  1,000 mL IntraVENous Once    etomidate        rocuronium        EPINEPHrine          Continuous Infusions:   EPINEPHrine 0.1 mcg/kg/min (06/21/24 1921)     PRN Meds:etomidate, rocuronium, EPINEPHrine    Allergies: No Known Allergies    REVIEW OF SYSTEMS:       History obtained from chart review    Review of Systems   Unable to perform ROS: Intubated       PHYSICAL EXAM:       Vitals: BP (!) 142/80   Pulse (!) 126   Temp 98.6 °F (37 °C) (Bladder)   Resp (!) 33   LMP  (LMP Unknown) Comment: unsure  SpO2 94%     I/O:  No intake or output data in the 24 hours ending 06/21/24 2001  No intake/output data recorded.  No intake/output data recorded.    Physical Examination:     Physical Exam  Constitutional:       Appearance: She is toxic-appearing.      Comments: intubated   HENT:      Head: Normocephalic and atraumatic.   Cardiovascular:      Rate and Rhythm: Regular rhythm. Tachycardia present.      Pulses: Normal pulses.      Heart sounds: Normal heart sounds. No murmur heard.     No friction rub. No gallop.   Pulmonary:      Effort: Respiratory distress present.      Breath sounds: Rhonchi present.      Comments: intubated  Abdominal:      General: Abdomen is flat. Bowel sounds are normal.      Palpations: Abdomen is soft.   Musculoskeletal:      Right lower leg: Edema present.      Left lower leg: Edema present.   Skin:     General: Skin is warm.           Access:   -Central Access Day #: 1                                  -Peripheral Access Day#:1  -Arterial line Day#:1                                                                      ETT Day#:1  Vent Settings:    / / /FiO2 : 100 %    No results for

## 2024-06-22 NOTE — PROGRESS NOTES
Called patient's daughter, Noa Colby, at the number provided by her facility paperwork. Informed her of her mother's admission, cardiac arrest, and critical condition. She states she will inform her sister, Emma, and drive over to the hospital as soon as she can. Updated her contact in our system with correct phone number.     Also contacted the patient's listed guardian per her facility paperwork. I cannot tell what this person's full name is as the paper hole punch goes directly through her last name, but it looks like Liza Dong HIPAA compliant voicemail with this person with a callback number.    Emmett Shane, DO  Internal Medicine, PGY-1  06/22/24  12:07 PM

## 2024-06-22 NOTE — PROGRESS NOTES
Multiple attempts to contact pt's nursing facility ([facility]) were unsuccessful. Unable to complete admission questionnaire.     Residents at bedside attempting new arterial line.

## 2024-06-22 NOTE — MANAGEMENT PLAN
Spoke with patient's family, including daughters Emma and Noa, as well as her appointed guardian, Liza Quiles, in-person concerning decision-making around hemodialysis and CODE STATUS. After lengthy discussions with both parties about her current medical predicament as well as her family's active involvement in her life, they have made a joint decision to change her to FULL CODE and to pursue hemodialysis or RRT at this time.    Emmett Shane, DO  Internal Medicine, PGY-1  06/22/24  5:43 PM

## 2024-06-22 NOTE — PROGRESS NOTES
Clinical Pharmacy Consult Note  Medication History     Admit Date: 6/21/2024    List of of current medications patient is taking is complete. Home Medication list in EPIC updated to reflect changes noted below.    Source of information: I used the patients assisted living transfer papers.    Patient's home pharmacy: [facility] [address] [facility] -  [phone] - F [phone]      Changes made to medication list:   Medications removed: (include reason, ex: therapy completed, patient no longer taking, etc.)  Vitamin D3  Imodium  Losartan  Olanzapine  Miralax  Sertraline  Medications added:   Tylenol OTC  Labetalol  Methocarbamol  Retacrit  Robitussin  Torsemide  Veltassa  Medication doses adjusted:   Tylenol 1000 mg Q6H PRN (NOT Q8H PRN)  Amlodipine 10 mg daily (not 5 mg daily)  Atorvastatin 20 mg daily (not 10 mg daily)  Calcitriol 0.5 mg every Monday, Wednesday, and Friday (not daily)  Docusate 200 mg daily (not 100 mg BID)  Ferrous sulfate 325 mg once daily (not 325 mg TID)  Sodium Bicarbonate 1,300 mg TID (not 650 mg QID)  Other notes:   None    Current Outpatient Medications   Medication Instructions    acetaminophen (TYLENOL) 1,000 mg, Oral, EVERY 6 HOURS PRN, (for right knee pain. Do not exceed 4 grams in 24 hours)    acetaminophen (TYLENOL) 650 mg, Oral, EVERY 8 HOURS PRN    amLODIPine (NORVASC) 10 mg, Oral, DAILY    atorvastatin (LIPITOR) 20 mg, Oral, DAILY    calcitRIOL (ROCALTROL) 0.5 MCG capsule Take one capsule by mouth on Monday, Wednesday, and Friday    Dextromethorphan-guaiFENesin  MG/5ML SYRP 10 mLs, Oral, EVERY 6 HOURS PRN    docusate sodium (COLACE) 200 mg, Oral, DAILY    ferrous sulfate (IRON 325) 325 mg, Oral, DAILY WITH BREAKFAST    folic acid (FOLVITE) 1 mg, Oral, DAILY    labetalol (NORMODYNE) 300 mg, Oral, 2 TIMES DAILY    levothyroxine (SYNTHROID) 50 mcg, Oral, DAILY    methocarbamol (ROBAXIN) 500 mg, Oral, 3 TIMES DAILY    Patiromer Sorbitex Calcium  (VELTASSA) 16.8 g PACK 1 packet, Oral, DAILY    Retacrit 20,000 Units, SubCUTAneous, EVERY 14 DAYS, Every other Tuesday.  Hold if HGB >11 or BP > 180/100    sodium bicarbonate 650 MG tablet 2 tablets, Oral, 3 TIMES DAILY    torsemide (DEMADEX) 20 MG tablet 2 tablets, Oral, DAILY       Please call with any questions.    Paul Horan, Pharmacy Intern

## 2024-06-22 NOTE — CONSULTS
Cardiology Consultation/History and Physical                                                                  Pt Name: Marcela Colby  Age: 64 y.o.  Sex: female  : 1960  Location: [facility]/4514[facility]01    Referring Physician: Elena Broussard MD        Reason for Consult:     Reason for Consultation/Chief Complaint: cardiopulmonary arrest    HPI:      Marcela Colby is a 64 y.o. female with a past medical history of hypertension hyperlipidemia CKD stage V, hypothyroidism, depression, thromboembolism presented from nursing facility with respiratory distress.    Patient is currently intubated and sedated.    Per documentation she presented to the ER for right leg pain and was discharged after an unremarkable evaluation.  She subsequently presented to the ER in respiratory distress and febrile.  At some point she developed cardiac arrest and required CPR    Histories     Past Medical History:   has a past medical history of Anemia, Anxiety, Atelectasis, CKD (chronic kidney disease), Clotting disorder (HCC), Constipation, Depression, Hyperlipidemia, Hypertension, Muscle weakness, Pancytopenia ([facility]), Paranoid schizophrenia ([facility]), Personal history of venous thrombosis and embolism, Thrombocytopenia ([facility]), Thyroid disease, and Vascular dementia ([facility]).    Surgical History:   has a past surgical history that includes Tubal ligation (); Dilation and curettage of uterus (2017); Total abdominal hysterectomy w/ bilateral salpingoophorectomy (2017); and Hysterectomy.     Social History:   reports that she quit smoking about 13 years ago. Her smoking use included cigarettes. She started smoking about 25 years ago. She has a 12.0 pack-year smoking history. She has never used smokeless tobacco. She reports that she does not drink alcohol and does not use drugs.     Family History:  No evidence for sudden cardiac death or premature CAD      Medications:       Home Medications  Were reviewed and are listed in nursing

## 2024-06-22 NOTE — CONSULTS
[facility] -  Clinical Pharmacy Note    Vancomycin - Management by Pharmacy    Consult Date(s): 6/21/24  Consulting Provider(s):  Dr Hammer    Assessment / Plan  Aspiration PNA - Vancomycin  Concurrent Antimicrobials: Zosyn  Day of Vanc Therapy / Ordered Duration: Day 1 / indefinite   Current Dosing Method: Intermittent Dosing by Levels  Therapeutic Goal: Trough ~ 15 mg/L  Current Dose / Plan:   Patient with CKD-stage 5  (SCr 5.9;  baseline ~ 4.1 on 3/14/24) - will dose intermittently for now  Loading dose of 2000 mg IV given in ED this evening- expect level to remain therapeutic until tomorrow am  Vancomycin dosing placeholder is entered  Level ordered tomorrow am to guide further dosing   Nasal MRSA PCR is ordered-- if negative, could consider discontinuing vancomycin     Will continue to monitor clinical condition and make adjustments to regimen as appropriate.    Thank you for consulting pharmacy,    Please call with any questions    Yoko El  Pharm.D. [facility]  [phone] (Main Pharmacy)        Interval update:  therapy initiation     Subjective/Objective:   Marcela Colby is a 64 y.o. female with a PMHx significant for HLD, HTN, venous thrombosis and embolism, chronic lymphedema, hypothyroidism, Vascular dementia, CKD stage-V.  Presented from NH with SOB and lower leg pain/swelling.  PEA cardiac arrest in ED and achieved ROSC after several rounds of CPR. Admitted 6/21 to ICU with aspiration pneumonia, septic shock, possible PE        Pharmacy is consulted to dose vancomycin.    Ht Readings from Last 1 Encounters:   06/21/24 1.6 m (5' 3\")     Wt Readings from Last 1 Encounters:   06/21/24 (!) 138.8 kg (306 lb 1.6 oz)     Current & Prior Antimicrobial Regimen(s):  Cefepime x1 (6/21)  Zosyn (6/21--current)  Vancomycin - pharmacy-dosed intermittent  2000 mg IV x 1 (6/21)    Vancomycin Level(s) / Doses:  Intermittent Dosing:   Date Time Dose  Level   6/21 2027 2000 mg IV x1          Note: Serum levels

## 2024-06-22 NOTE — PROGRESS NOTES
Pt taken to CT scan and back without issues. Labs drawn. RLE continues to weep large amount of fluid- new dressing applied. Sedated with propofol and fentanyl. Weaning pressors as able. ICU residents to attempt to reach pt's family again.

## 2024-06-22 NOTE — PROGRESS NOTES
Headband: remains in place  Date/Time: 6/21/24 4030  Recorder: recording started  Skin: intact  Highest Seizure [place] Percentage past hour: 0% (initial 5 minutes)       General info regarding Seizure [place] %:  Minimum duration of study is 2 hours. If Seizure [place] has remained 0% throughout the entire 2-hour duration, communicate with provider to stop the recording.     Seizure [place] 0-10% - Continue to monitor and complete 2-hour study.  Seizure [place] 11-89% - Epileptiform activity present. Notify provider for next steps.  Seizure [place] >/= 90% - Epileptiform activity consistent with Status Epilepticus. Immediately notify provider.     *Patients with Seizure [place] above 10% that persists may require a study longer than 2 hours. Maximum recording duration is 24 hours. Please update provider with a persistent increase in Seizure [place] above 10%.

## 2024-06-22 NOTE — PROGRESS NOTES
[facility] - Clinical Pharmacy Note - Extended Infusion Beta-Lactam Adjustment    Piperacillin/Tazobactam ordered for treatment of aspiration PNA. Per [facility] Extended Infusion Beta-Lactam Policy, zosyn will be changed to 3375 mg every 12 hours.     Estimated Creatinine Clearance: Estimated Creatinine Clearance: 13 mL/min (A) (based on SCr of 5.9 mg/dL (HH)).  Dialysis Status, LUIS, CKD: chronic renal failure stage 5  BMI: There is no height or weight on file to calculate BMI.    Rationale for Adjustment: Agent demonstrates time-dependent effect on bacterial eradication. Extended-infusion dosing strategy aims to enhance microbiologic and clinical efficacy.    Pharmacy will continue to monitor cultures and sensitivities (where available) and adjust dose as necessary.      Please call with any questions.    Denisse Sawyer, PharmD  Main Pharmacy: [phone]

## 2024-06-22 NOTE — PROGRESS NOTES
Comprehensive Nutrition Assessment    RECOMMENDATIONS:  Nutrition Support:  Vasopressor dosing equivalent score = 43. For VDE >12 trophic TF only (10 mL/hr).  Will reassess ability to start TF 6/24  Nutrition Education: Education not indicated     NUTRITION ASSESSMENT:   Nutritional summary & status: New vent.  Acute hypoxic respiratory failure s/p cardiac arrest resulting in intubation.  Concerns for PE.  Sedated on fentanyl and propofol (337kcal).  Levo and vaso on board- VDE too high for TF at this time.  Per neuro, concerns for anoxic brain injury.  Pt wt overall wt stable and appears well nourished upon exam.   Admission // PMH: cardiac arrest // HLD, HTN, venous thrombosis and embolism, Vascular dementia, CKD    MALNUTRITION ASSESSMENT  Context of Malnutrition: Acute Illness   Malnutrition Status: At risk for malnutrition (Comment)  Findings of the 6 clinical characteristics of malnutrition (Minimum of 2 out of 6 clinical characteristics is required to make the diagnosis of moderate or severe Protein Calorie Malnutrition based on AND/ASPEN Guidelines):  Energy Intake:  Mild decrease in energy intake (Comment)  Weight Loss:  No significant weight loss     Body Fat Loss:  No significant body fat loss     Muscle Mass Loss:  No significant muscle mass loss      NUTRITION DIAGNOSIS   Inadequate oral intake related to impaired respiratory function as evidenced by intubation    Nutrition Monitoring and Evaluation:   Food/Nutrient Intake Outcomes:  Food and Nutrient Intake, Supplement Intake  Physical Signs/Symptoms Outcomes:  Biochemical Data, Nutrition Focused Physical Findings, Weight, Hemodynamic Status     OBJECTIVE DATA: Significant to nutrition assessment  Nutrition Related Findings: Na 131, Phos 7.3, BUN 84, Cr 6.6+3 RLE edema;  Wounds: None  Nutrition Goals: Initiate nutrition support, by next RD assessment     CURRENT NUTRITION THERAPIES  Diet NPO  PO Intake: NPO   PO Supplement Intake:NPO  Additional  Sources of Calories/IVF:prop @ 12.1ml/h = 337kcal     COMPARATIVE STANDARDS  Energy (kcal):  1453-2019 (PSE)     Protein (g):  104-114 (2-2.2)       Fluid (ml/day):  1ml/kcal or FW deficit as appropriate    ANTHROPOMETRICS  Current Height: 160 cm (5' 3\")  Current Weight - Scale: (!) 141.5 kg (312 lb)    Admission weight: (!) 141.7 kg (312 lb 6.3 oz)    The patient will be monitored per nutrition standards of care. Consult dietitian if additional nutrition interventions are needed prior to RD reassessment.     MARIE HUSSEIN, MS, RD, LD  Tj:  [phone]

## 2024-06-22 NOTE — PROGRESS NOTES
Patient was evaluated post cardiac arrest. Called number in chart for daughter Mari Colby to verify code status as well as to give updates post cardiac arrest. Number on file is no longer in service. Will call nursing facility to get more information as well as see if a different number is on file to contact daughter.     21:25 Update: attempted calling [facility], no answer    5:17 Update: attempted calling Emma, daughter of patient. Number on file was a wrong number. Unsuccessful attempt to contact daughter.

## 2024-06-22 NOTE — PROGRESS NOTES
Headband: remains in place  Date/Time: 6/22/24 0041  Recorder: recording started  Skin: intact  Highest Seizure [place] Percentage past hour: 0%      General info regarding Seizure [place] %:  Minimum duration of study is 2 hours. If Seizure [place] has remained 0% throughout the entire 2-hour duration, communicate with provider to stop the recording.     Seizure [place] 0-10% - Continue to monitor and complete 2-hour study.  Seizure [place] 11-89% - Epileptiform activity present. Notify provider for next steps.  Seizure [place] >/= 90% - Epileptiform activity consistent with Status Epilepticus. Immediately notify provider.     *Patients with Seizure [place] above 10% that persists may require a study longer than 2 hours. Maximum recording duration is 24 hours. Please update provider with a persistent increase in Seizure [place] above 10%.

## 2024-06-22 NOTE — PROGRESS NOTES
[facility] -  Clinical Pharmacy Note    Vancomycin - Management by Pharmacy    Consult Date(s): 6/21/24  Consulting Provider(s):  Dr Hammer    Assessment / Plan  Aspiration PNA - Vancomycin  Concurrent Antimicrobials: Zosyn  Per Renal Dose policy, will adjust dosing to 4.5g IV EI q12h for BMI > 40  Day of Vanc Therapy / Ordered Duration: Day 2   Current Dosing Method: Intermittent Dosing by Levels  Therapeutic Goal: Trough ~ 15 mg/L  Current Dose / Plan:   Patient with CKD-stage 5.  SCr today 6.6;  baseline ~ 4.1 on 3/14/24).  UOP essentially nil.  Await Nephrology plan for today.  Will dose intermittently via levels for now.  Level today = 22.7 mg/L, drawn ~10 hr after loading dose of 2000 mg IV last evening.    For now, no further vancomycin needed today, as expect patient to remain therapeutic through tomorrow.  Plan to obtain a random level 6/23 AM.    Will follow Nephrology plan.  If patient needs CRRT, will adjust plan.    Addendum @ 14:20 - per notes, patient wishes are to avoid dialysis.  Current vancomycin plan stands - no dose today, obtain level 6/23 AM.    Will continue to monitor clinical condition and make adjustments to regimen as appropriate.    Thank you for consulting pharmacy.  Please call with any questions  Milvia FlyodD., BCPS   6/22/2024 8:28 AM  Wireless: [phone]          Interval update:   Patient noted to have non-purposeful jerking movements; Ceribell overnight showing 0% seizure burden.  Patient remains intubated, and now requiring sedation as responding.  Febrile to 101.8 degF.  On multiple pressors.  V/Q scan ordered.      Subjective/Objective:   Marcela Colby is a 64 y.o. female with a PMHx significant for HLD, HTN, venous thrombosis and embolism, chronic lymphedema, hypothyroidism, Vascular dementia, CKD stage-V.  Presented from NH with SOB and lower leg pain/swelling.  PEA cardiac arrest in ED and achieved ROSC after several rounds of CPR. Admitted 6/21 to ICU with  aspiration pneumonia, septic shock, possible PE        Pharmacy is consulted to dose vancomycin.    Ht Readings from Last 1 Encounters:   06/21/24 1.6 m (5' 3\")     Wt Readings from Last 1 Encounters:   06/21/24 (!) 141.7 kg (312 lb 6.3 oz)     Current & Prior Antimicrobial Regimen(s):  Cefepime x1 (6/21)  Zosyn (6/21--current)  Vancomycin - pharmacy to dose  Intermittent via levels    Date Vanc Level Vanc Dose   6/21  2000mg   6/22 22.7 mg/L --   6/23 Ordered        Cultures & Sensitivities:    Date Site Micro Susceptibility / Result    6/21 Nasal MRSA In process            Recent Labs     06/21/24  2232 06/22/24  0318 06/22/24  0617   CREATININE 6.2* 6.3* 6.6*   BUN 79* 83* 84*   WBC 6.3 2.4* 2.4*       Estimated Creatinine Clearance: 12 mL/min (A) (based on SCr of 6.6 mg/dL (HH)).    Additional Lab Values / Findings of Note:    No results for input(s): \"PROCAL\" in the last 72 hours.

## 2024-06-22 NOTE — PROGRESS NOTES
06/21/24 5931   Encounter Summary   Encounter Overview/Reason Follow-up   Service Provided For Patient  (Checked on pt status; no family reached yet)   Last Encounter  06/21/24  (SC)     Student juan Shelley

## 2024-06-22 NOTE — CONSULTS
ICU Consult Note    Admit Date: 6/21/2024  Hospital Day: 1  ICU Day: 16h  Vent Day: 2  IV Access: Peripheral, Central  IV Fluids: None  Vasopressors: Norepinephrine, Epinephrine, Vasopressin  Antibiotics: Vancomycin, Zosyn  Diet: Diet NPO  PCP: Thalia Au MD  Code Status: DNR-CCA    CC: Respiratory Distress (Pt brought in by EMS from [facility] for respiratory distress, CIPAP placed en route )      Interval history:  See HPI    HPI:  Ms. Marcela Colby is a 64 y.o. female with a MHx significant for CKD V, chronic anemia, HTN, hypothyroidism, history of DVT and PE who presented with respiratory distress.    She initially presented to the ED yesterday with pain in her right leg and foot. She was worked up for RLE DVT, venous doppler unremarkable. She presented later in the day again due to respiratory distress. Arrived on CPAP. Was initially hypertensive, febrile to 102.3F, tachycardic to 108 bpm, tachypneic to 42/min, saturating 100% on 100% FiO2. She had an elevated lactic acid, chest xray suspicious for possible pneumonia. Was being treated for suspected septic shock in the ED, with additional concern for hypervolemia. She was started on vancomycin and cefepime, given 500 mL IVF due to hypervolemia and CKD V history.     While waiting for transit to ICU she went into cardiac arrest, reportedly PEA. Had about 30 minutes of CPR, was intubated, received calcium, 2 amps bicarb, epinephrine pushes. Crash L femoral arterial line placed. After ROSC was achieved she was started on an epinephrine gtt. Sterile R femoral CVC placed in ED.     She was transferred to the ICU for further monitoring and care.     Medications:     Scheduled Meds:    levothyroxine  50 mcg Oral Daily    [START ON 6/24/2024] calcitRIOL  0.5 mcg Oral Q MWF    folic acid  1 mg Oral Daily    piperacillin-tazobactam  4,500 mg IntraVENous Q12H    [START ON 6/23/2024] lansoprazole  30 mg Orogastric QAM AC    polyvinyl alcohol  1 drop Both Eyes Q4H     antibiotics.  Her pneumonia panel was positive for haemophilus influenza and Moraxella but at low colony counts and her admitting chest x-ray was clear.  Her post arrest chest x-ray had multifocal airspace disease.    Because of the change in plan we have consulted surgery for Vas-Cath, we will need to reverse her coagulopathy with FFP and will attempt dialysis via CRRT.  Unclear if her blood pressure will tolerate the pressure changes.    Critical care time spent reviewing labs/films, examining patient, collaborating with other physicians but excluding procedures for life threatening organ failure is 80 minutes.      Jeremy Argueta MD

## 2024-06-23 LAB
ALBUMIN SERPL-MCNC: 2.6 G/DL (ref 3.4–5)
ALBUMIN SERPL-MCNC: 2.8 G/DL (ref 3.4–5)
ALBUMIN SERPL-MCNC: 2.8 G/DL (ref 3.4–5)
ALBUMIN/GLOB SERPL: 0.9 {RATIO} (ref 1.1–2.2)
ALBUMIN/GLOB SERPL: 1 {RATIO} (ref 1.1–2.2)
ALP SERPL-CCNC: 74 U/L (ref 40–129)
ALP SERPL-CCNC: 88 U/L (ref 40–129)
ALT SERPL-CCNC: 899 U/L (ref 10–40)
ALT SERPL-CCNC: 997 U/L (ref 10–40)
ANION GAP SERPL CALCULATED.3IONS-SCNC: 11 MMOL/L (ref 3–16)
ANION GAP SERPL CALCULATED.3IONS-SCNC: 15 MMOL/L (ref 3–16)
ANION GAP SERPL CALCULATED.3IONS-SCNC: 20 MMOL/L (ref 3–16)
ANISOCYTOSIS BLD QL SMEAR: ABNORMAL
APTT BLD: 39.7 SEC (ref 22.1–36.4)
AST SERPL-CCNC: 497 U/L (ref 15–37)
AST SERPL-CCNC: 690 U/L (ref 15–37)
BASE EXCESS BLDA CALC-SCNC: -2 MMOL/L (ref -3–3)
BASE EXCESS BLDA CALC-SCNC: -5 MMOL/L (ref -3–3)
BASE EXCESS BLDA CALC-SCNC: -5 MMOL/L (ref -3–3)
BASOPHILS # BLD: 0 K/UL (ref 0–0.2)
BASOPHILS NFR BLD: 0 %
BILIRUB SERPL-MCNC: 1.1 MG/DL (ref 0–1)
BILIRUB SERPL-MCNC: 1.3 MG/DL (ref 0–1)
BODY TEMPERATURE: 35.7
BODY TEMPERATURE: 35.8
BUN SERPL-MCNC: 28 MG/DL (ref 7–20)
BUN SERPL-MCNC: 56 MG/DL (ref 7–20)
BUN SERPL-MCNC: 87 MG/DL (ref 7–20)
CA-I BLD-SCNC: 0.98 MMOL/L (ref 1.12–1.32)
CALCIUM SERPL-MCNC: 7.4 MG/DL (ref 8.3–10.6)
CALCIUM SERPL-MCNC: 7.4 MG/DL (ref 8.3–10.6)
CALCIUM SERPL-MCNC: 7.6 MG/DL (ref 8.3–10.6)
CHLORIDE SERPL-SCNC: 100 MMOL/L (ref 99–110)
CHLORIDE SERPL-SCNC: 100 MMOL/L (ref 99–110)
CHLORIDE SERPL-SCNC: 101 MMOL/L (ref 99–110)
CO2 BLDA-SCNC: 23 MMOL/L
CO2 BLDA-SCNC: 23 MMOL/L
CO2 BLDA-SCNC: 25 MMOL/L
CO2 SERPL-SCNC: 17 MMOL/L (ref 21–32)
CO2 SERPL-SCNC: 21 MMOL/L (ref 21–32)
CO2 SERPL-SCNC: 22 MMOL/L (ref 21–32)
CREAT SERPL-MCNC: 2.1 MG/DL (ref 0.6–1.2)
CREAT SERPL-MCNC: 4 MG/DL (ref 0.6–1.2)
CREAT SERPL-MCNC: 6.6 MG/DL (ref 0.6–1.2)
DEPRECATED RDW RBC AUTO: 16 % (ref 12.4–15.4)
EOSINOPHIL # BLD: 0 K/UL (ref 0–0.6)
EOSINOPHIL NFR BLD: 0 %
GFR SERPLBLD CREATININE-BSD FMLA CKD-EPI: 12 ML/MIN/{1.73_M2}
GFR SERPLBLD CREATININE-BSD FMLA CKD-EPI: 25 ML/MIN/{1.73_M2}
GFR SERPLBLD CREATININE-BSD FMLA CKD-EPI: 6 ML/MIN/{1.73_M2}
GLUCOSE BLD-MCNC: 113 MG/DL (ref 70–99)
GLUCOSE SERPL-MCNC: 104 MG/DL (ref 70–99)
GLUCOSE SERPL-MCNC: 114 MG/DL (ref 70–99)
GLUCOSE SERPL-MCNC: 98 MG/DL (ref 70–99)
HCO3 BLDA-SCNC: 21.6 MMOL/L (ref 21–29)
HCO3 BLDA-SCNC: 21.8 MMOL/L (ref 21–29)
HCO3 BLDA-SCNC: 23.4 MMOL/L (ref 21–29)
HCT VFR BLD AUTO: 27.3 % (ref 36–48)
HGB BLD-MCNC: 9.3 G/DL (ref 12–16)
INR PPP: 2.38 (ref 0.85–1.15)
LACTATE BLD-SCNC: 2.2 MMOL/L (ref 0.4–2)
LACTATE BLD-SCNC: 2.58 MMOL/L (ref 0.4–2)
LACTATE BLDV-SCNC: 3.4 MMOL/L (ref 0.4–2)
LACTATE BLDV-SCNC: 3.7 MMOL/L (ref 0.4–2)
LEGIONELLA AG UR QL: NORMAL
LYMPHOCYTES # BLD: 0.2 K/UL (ref 1–5.1)
LYMPHOCYTES NFR BLD: 2 %
MAGNESIUM SERPL-MCNC: 2.3 MG/DL (ref 1.8–2.4)
MAGNESIUM SERPL-MCNC: 2.5 MG/DL (ref 1.8–2.4)
MCH RBC QN AUTO: 32.5 PG (ref 26–34)
MCHC RBC AUTO-ENTMCNC: 34.3 G/DL (ref 31–36)
MCV RBC AUTO: 94.7 FL (ref 80–100)
METAMYELOCYTES NFR BLD MANUAL: 4 %
MICROCYTES BLD QL SMEAR: ABNORMAL
MONOCYTES # BLD: 2.2 K/UL (ref 0–1.3)
MONOCYTES NFR BLD: 20 %
MYELOCYTES NFR BLD MANUAL: 13 %
NEUTROPHILS # BLD: 8.4 K/UL (ref 1.7–7.7)
NEUTROPHILS NFR BLD: 45 %
NEUTS BAND NFR BLD MANUAL: 16 % (ref 0–7)
PATH INTERP BLD-IMP: NO
PCO2 BLDA: 43.2 MM HG (ref 35–45)
PCO2 BLDA: 47.1 MM HG (ref 35–45)
PCO2 BLDA: 47.7 MM HG (ref 35–45)
PERFORMED ON: ABNORMAL
PH BLDA: 7.26 [PH] (ref 7.35–7.45)
PH BLDA: 7.27 [PH] (ref 7.35–7.45)
PH BLDA: 7.34 [PH] (ref 7.35–7.45)
PH VENOUS: 7.35 (ref 7.35–7.45)
PHOSPHATE SERPL-MCNC: 2.7 MG/DL (ref 2.5–4.9)
PHOSPHATE SERPL-MCNC: 6 MG/DL (ref 2.5–4.9)
PLATELET # BLD AUTO: 100 K/UL (ref 135–450)
PLATELET BLD QL SMEAR: ABNORMAL
PMV BLD AUTO: 9.5 FL (ref 5–10.5)
PO2 BLDA: 78.4 MM HG (ref 75–108)
PO2 BLDA: 90.1 MM HG (ref 75–108)
PO2 BLDA: 99.4 MM HG (ref 75–108)
POC SAMPLE TYPE: ABNORMAL
POTASSIUM SERPL-SCNC: 5.2 MMOL/L (ref 3.5–5.1)
POTASSIUM SERPL-SCNC: 5.5 MMOL/L (ref 3.5–5.1)
POTASSIUM SERPL-SCNC: 5.6 MMOL/L (ref 3.5–5.1)
PROT SERPL-MCNC: 5.7 G/DL (ref 6.4–8.2)
PROT SERPL-MCNC: 5.8 G/DL (ref 6.4–8.2)
PROTHROMBIN TIME: 26 SEC (ref 11.9–14.9)
RBC # BLD AUTO: 2.88 M/UL (ref 4–5.2)
S PNEUM AG UR QL: ABNORMAL
SAO2 % BLDA: 95 % (ref 93–100)
SAO2 % BLDA: 96 % (ref 93–100)
SAO2 % BLDA: 97 % (ref 93–100)
SLIDE REVIEW: ABNORMAL
SODIUM SERPL-SCNC: 133 MMOL/L (ref 136–145)
SODIUM SERPL-SCNC: 137 MMOL/L (ref 136–145)
SODIUM SERPL-SCNC: 137 MMOL/L (ref 136–145)
TROPONIN, HIGH SENSITIVITY: 410 NG/L (ref 0–14)
VANCOMYCIN SERPL-MCNC: 9.3 UG/ML
WBC # BLD AUTO: 10.8 K/UL (ref 4–11)

## 2024-06-23 PROCEDURE — 80053 COMPREHEN METABOLIC PANEL: CPT

## 2024-06-23 PROCEDURE — 84484 ASSAY OF TROPONIN QUANT: CPT

## 2024-06-23 PROCEDURE — 82803 BLOOD GASES ANY COMBINATION: CPT

## 2024-06-23 PROCEDURE — 6370000000 HC RX 637 (ALT 250 FOR IP)

## 2024-06-23 PROCEDURE — 2700000000 HC OXYGEN THERAPY PER DAY

## 2024-06-23 PROCEDURE — 2500000003 HC RX 250 WO HCPCS

## 2024-06-23 PROCEDURE — 80202 ASSAY OF VANCOMYCIN: CPT

## 2024-06-23 PROCEDURE — 2580000003 HC RX 258: Performed by: INTERNAL MEDICINE

## 2024-06-23 PROCEDURE — 6360000002 HC RX W HCPCS: Performed by: EMERGENCY MEDICINE

## 2024-06-23 PROCEDURE — 6360000002 HC RX W HCPCS

## 2024-06-23 PROCEDURE — 99291 CRITICAL CARE FIRST HOUR: CPT | Performed by: INTERNAL MEDICINE

## 2024-06-23 PROCEDURE — 90945 DIALYSIS ONE EVALUATION: CPT

## 2024-06-23 PROCEDURE — 80069 RENAL FUNCTION PANEL: CPT

## 2024-06-23 PROCEDURE — 2580000003 HC RX 258

## 2024-06-23 PROCEDURE — 94761 N-INVAS EAR/PLS OXIMETRY MLT: CPT

## 2024-06-23 PROCEDURE — 99233 SBSQ HOSP IP/OBS HIGH 50: CPT | Performed by: INTERNAL MEDICINE

## 2024-06-23 PROCEDURE — 85025 COMPLETE CBC W/AUTO DIFF WBC: CPT

## 2024-06-23 PROCEDURE — 84478 ASSAY OF TRIGLYCERIDES: CPT

## 2024-06-23 PROCEDURE — 2580000003 HC RX 258: Performed by: EMERGENCY MEDICINE

## 2024-06-23 PROCEDURE — 83735 ASSAY OF MAGNESIUM: CPT

## 2024-06-23 PROCEDURE — 82947 ASSAY GLUCOSE BLOOD QUANT: CPT

## 2024-06-23 PROCEDURE — 85610 PROTHROMBIN TIME: CPT

## 2024-06-23 PROCEDURE — 85730 THROMBOPLASTIN TIME PARTIAL: CPT

## 2024-06-23 PROCEDURE — 84100 ASSAY OF PHOSPHORUS: CPT

## 2024-06-23 PROCEDURE — 36415 COLL VENOUS BLD VENIPUNCTURE: CPT

## 2024-06-23 PROCEDURE — 83605 ASSAY OF LACTIC ACID: CPT

## 2024-06-23 PROCEDURE — 2000000000 HC ICU R&B

## 2024-06-23 PROCEDURE — 82330 ASSAY OF CALCIUM: CPT

## 2024-06-23 PROCEDURE — 94003 VENT MGMT INPAT SUBQ DAY: CPT

## 2024-06-23 RX ORDER — CALCIUM GLUCONATE 20 MG/ML
2000 INJECTION, SOLUTION INTRAVENOUS ONCE
Status: COMPLETED | OUTPATIENT
Start: 2024-06-23 | End: 2024-06-24

## 2024-06-23 RX ADMIN — Medication: at 22:25

## 2024-06-23 RX ADMIN — Medication: at 00:46

## 2024-06-23 RX ADMIN — SODIUM CHLORIDE 1500 MG: 9 INJECTION, SOLUTION INTRAVENOUS at 09:59

## 2024-06-23 RX ADMIN — POLYVINYL ALCOHOL 1 DROP: 1.4 SOLUTION/ DROPS OPHTHALMIC at 03:17

## 2024-06-23 RX ADMIN — NOREPINEPHRINE BITARTRATE 40 MCG/MIN: 1 INJECTION, SOLUTION, CONCENTRATE INTRAVENOUS at 09:48

## 2024-06-23 RX ADMIN — SODIUM CHLORIDE: 9 INJECTION, SOLUTION INTRAVENOUS at 11:16

## 2024-06-23 RX ADMIN — PIPERACILLIN AND TAZOBACTAM 4500 MG: 4; .5 INJECTION, POWDER, LYOPHILIZED, FOR SOLUTION INTRAVENOUS at 06:34

## 2024-06-23 RX ADMIN — Medication: at 07:38

## 2024-06-23 RX ADMIN — PIPERACILLIN AND TAZOBACTAM 4500 MG: 4; .5 INJECTION, POWDER, LYOPHILIZED, FOR SOLUTION INTRAVENOUS at 22:31

## 2024-06-23 RX ADMIN — EPINEPHRINE 0.15 MCG/KG/MIN: 1 INJECTION INTRAMUSCULAR; INTRAVENOUS; SUBCUTANEOUS at 20:37

## 2024-06-23 RX ADMIN — SODIUM CHLORIDE, PRESERVATIVE FREE 10 ML: 5 INJECTION INTRAVENOUS at 09:52

## 2024-06-23 RX ADMIN — Medication 100 MCG/HR: at 02:21

## 2024-06-23 RX ADMIN — HEPARIN SODIUM 5000 UNITS: 5000 INJECTION INTRAVENOUS; SUBCUTANEOUS at 06:25

## 2024-06-23 RX ADMIN — SODIUM CHLORIDE: 9 INJECTION, SOLUTION INTRAVENOUS at 00:18

## 2024-06-23 RX ADMIN — Medication: at 03:41

## 2024-06-23 RX ADMIN — EPINEPHRINE 0.2 MCG/KG/MIN: 1 INJECTION INTRAMUSCULAR; INTRAVENOUS; SUBCUTANEOUS at 06:25

## 2024-06-23 RX ADMIN — PROPOFOL 25 MCG/KG/MIN: 10 INJECTION, EMULSION INTRAVENOUS at 09:52

## 2024-06-23 RX ADMIN — POLYVINYL ALCOHOL 1 DROP: 1.4 SOLUTION/ DROPS OPHTHALMIC at 20:28

## 2024-06-23 RX ADMIN — Medication: at 11:17

## 2024-06-23 RX ADMIN — EPINEPHRINE 0.24 MCG/KG/MIN: 1 INJECTION INTRAMUSCULAR; INTRAVENOUS; SUBCUTANEOUS at 13:14

## 2024-06-23 RX ADMIN — Medication: at 15:02

## 2024-06-23 RX ADMIN — POLYVINYL ALCOHOL 1 DROP: 1.4 SOLUTION/ DROPS OPHTHALMIC at 12:05

## 2024-06-23 RX ADMIN — POLYVINYL ALCOHOL 1 DROP: 1.4 SOLUTION/ DROPS OPHTHALMIC at 14:09

## 2024-06-23 RX ADMIN — NOREPINEPHRINE BITARTRATE 60 MCG/MIN: 1 INJECTION, SOLUTION, CONCENTRATE INTRAVENOUS at 03:07

## 2024-06-23 RX ADMIN — PROPOFOL 30 MCG/KG/MIN: 10 INJECTION, EMULSION INTRAVENOUS at 02:16

## 2024-06-23 RX ADMIN — EPINEPHRINE 0.2 MCG/KG/MIN: 1 INJECTION INTRAMUSCULAR; INTRAVENOUS; SUBCUTANEOUS at 03:08

## 2024-06-23 RX ADMIN — VASOPRESSIN 0.03 UNITS/MIN: 20 INJECTION INTRAVENOUS at 10:36

## 2024-06-23 RX ADMIN — HEPARIN SODIUM 5000 UNITS: 5000 INJECTION INTRAVENOUS; SUBCUTANEOUS at 22:15

## 2024-06-23 RX ADMIN — LANSOPRAZOLE 30 MG: 30 TABLET, ORALLY DISINTEGRATING, DELAYED RELEASE ORAL at 06:26

## 2024-06-23 RX ADMIN — EPINEPHRINE 0.2 MCG/KG/MIN: 1 INJECTION INTRAMUSCULAR; INTRAVENOUS; SUBCUTANEOUS at 16:41

## 2024-06-23 RX ADMIN — Medication: at 03:40

## 2024-06-23 RX ADMIN — PROPOFOL 25 MCG/KG/MIN: 10 INJECTION, EMULSION INTRAVENOUS at 06:19

## 2024-06-23 RX ADMIN — HEPARIN SODIUM 5000 UNITS: 5000 INJECTION INTRAVENOUS; SUBCUTANEOUS at 15:09

## 2024-06-23 RX ADMIN — Medication: at 18:47

## 2024-06-23 RX ADMIN — POLYVINYL ALCOHOL 1 DROP: 1.4 SOLUTION/ DROPS OPHTHALMIC at 07:44

## 2024-06-23 RX ADMIN — CALCIUM GLUCONATE 2000 MG: 20 INJECTION, SOLUTION INTRAVENOUS at 22:35

## 2024-06-23 RX ADMIN — PIPERACILLIN AND TAZOBACTAM 4500 MG: 4; .5 INJECTION, POWDER, LYOPHILIZED, FOR SOLUTION INTRAVENOUS at 15:15

## 2024-06-23 RX ADMIN — EPINEPHRINE 0.3 MCG/KG/MIN: 1 INJECTION INTRAMUSCULAR; INTRAVENOUS; SUBCUTANEOUS at 00:55

## 2024-06-23 RX ADMIN — Medication: at 15:07

## 2024-06-23 RX ADMIN — PROPOFOL 25 MCG/KG/MIN: 10 INJECTION, EMULSION INTRAVENOUS at 15:26

## 2024-06-23 RX ADMIN — NOREPINEPHRINE BITARTRATE 40 MCG/MIN: 1 INJECTION, SOLUTION, CONCENTRATE INTRAVENOUS at 17:18

## 2024-06-23 RX ADMIN — FOLIC ACID 1 MG: 1 TABLET ORAL at 09:52

## 2024-06-23 RX ADMIN — EPINEPHRINE 0.2 MCG/KG/MIN: 1 INJECTION INTRAMUSCULAR; INTRAVENOUS; SUBCUTANEOUS at 09:46

## 2024-06-23 RX ADMIN — VASOPRESSIN 0.03 UNITS/MIN: 20 INJECTION INTRAVENOUS at 22:24

## 2024-06-23 RX ADMIN — LEVOTHYROXINE SODIUM 50 MCG: 50 TABLET ORAL at 06:26

## 2024-06-23 RX ADMIN — Medication 100 MCG/HR: at 14:15

## 2024-06-23 ASSESSMENT — PULMONARY FUNCTION TESTS
PIF_VALUE: 22
PIF_VALUE: 18
PIF_VALUE: 15
PIF_VALUE: 28
PIF_VALUE: 14
PIF_VALUE: 24
PIF_VALUE: 10
PIF_VALUE: 21
PIF_VALUE: 21
PIF_VALUE: 23
PIF_VALUE: 24
PIF_VALUE: 21
PIF_VALUE: 23
PIF_VALUE: 14
PIF_VALUE: 26
PIF_VALUE: 23
PIF_VALUE: 14
PIF_VALUE: 14
PIF_VALUE: 28
PIF_VALUE: 14
PIF_VALUE: 10
PIF_VALUE: 24
PIF_VALUE: 21
PIF_VALUE: 20
PIF_VALUE: 25
PIF_VALUE: 22
PIF_VALUE: 14
PIF_VALUE: 14
PIF_VALUE: 15
PIF_VALUE: 24
PIF_VALUE: 26
PIF_VALUE: 18
PIF_VALUE: 14
PIF_VALUE: 27
PIF_VALUE: 24
PIF_VALUE: 14
PIF_VALUE: 24
PIF_VALUE: 14
PIF_VALUE: 23
PIF_VALUE: 10
PIF_VALUE: 15
PIF_VALUE: 14
PIF_VALUE: 26
PIF_VALUE: 11
PIF_VALUE: 22
PIF_VALUE: 19
PIF_VALUE: 10
PIF_VALUE: 14
PIF_VALUE: 26
PIF_VALUE: 18
PIF_VALUE: 23
PIF_VALUE: 24
PIF_VALUE: 23
PIF_VALUE: 23
PIF_VALUE: 11
PIF_VALUE: 26
PIF_VALUE: 20
PIF_VALUE: 12
PIF_VALUE: 25
PIF_VALUE: 15
PIF_VALUE: 24
PIF_VALUE: 14
PIF_VALUE: 29
PIF_VALUE: 28
PIF_VALUE: 14
PIF_VALUE: 17
PIF_VALUE: 24
PIF_VALUE: 27

## 2024-06-23 ASSESSMENT — PAIN SCALES - GENERAL
PAINLEVEL_OUTOF10: 0

## 2024-06-23 NOTE — PROGRESS NOTES
ICU Consult Note    Admit Date: 6/21/2024  Hospital Day: 2  ICU Day: 1d 10h  Vent Day: 2  IV Access: Peripheral, Central  IV Fluids: None  Vasopressors: Norepinephrine, Epinephrine, Vasopressin  Antibiotics: Vancomycin, Zosyn (6/21-)  Diet: Diet NPO  PCP: Thalia Au MD  Code Status: Full Code    CC: Respiratory Distress (Pt brought in by EMS from SNF for respiratory distress, CIPAP placed en route )    Interval history:  Briefly, Ms. Marcela Colby is a 64 y.o. female with a MHx significant for CKD V, chronic anemia, HTN, hypothyroidism, history of DVT and PE who presented with respiratory distress and septic shock presumably 2/2 PNA. She had PEA arrest on day of admission and coded for 30 minutes with ROSC and started on epi drip.     This morning pt seen at bedside. She remains on vaso gtt, 0.2 epi gtt, and 40 of Levo. Hypothermic and on alison hugger. Remains intubated and sedated on 25 propofol and 100 fentanyl. Deeply sedated and not responsive. Difficult to obtain neuro exam. Was making low UOP.Received 1 unit FFP for placement of vascath and currently getting CRRT and keeping fluids even.     Vent - 16 (25)  450  8  100%   Last ABG 6/22 - 7.319  32.5  88.6  16.7     Medications:     Scheduled Meds:    levothyroxine  50 mcg Oral Daily    [START ON 6/24/2024] calcitRIOL  0.5 mcg Oral Q MWF    folic acid  1 mg Oral Daily    lansoprazole  30 mg Orogastric QAM AC    polyvinyl alcohol  1 drop Both Eyes Q4H    piperacillin-tazobactam  4,500 mg IntraVENous Q8H    lactated ringers  500 mL IntraVENous Once    sodium chloride flush  5-40 mL IntraVENous 2 times per day    heparin (porcine)  5,000 Units SubCUTAneous 3 times per day    vancomycin (VANCOCIN) intermittent dosing (placeholder)   Other RX Placeholder     Continuous Infusions:    fentaNYL 100 mcg/hr (06/23/24 0221)    propofol 25 mcg/kg/min (06/23/24 0619)    prismaSATE BGK 4/2.5 1,400 mL/hr at 06/23/24 0340    prismaSATE BGK 4/2.5 1,400 mL/hr at  14 try to blow the CO2 down further.  May also be able to decrease her sedation  She continues to not be able to follow commands and will spontaneously move her left foot.  Concern for possible anoxic injury on top of her vascular dementia remains    She is on broad-spectrum antibiotics with vancomycin and piperacillin/tazobactam.  She had a pneumonia swab that was positive for haemophilus influenza and Moraxella, although very small colony counts.  She also has a urine strep antigen that is positive.  I am most inclined to believe the strep as the true culprit, as it fits better with her leg is the primary source as opposed to a pneumonia.  I do not believe that she has a pneumonia, as she has minimal to no secretions, and her chest x-ray was clear prior to arrest.  Regardless we are treating all organisms that have been identified thus far and the antibiotics have good lung penetration.    After the realization that we have conflict in primary decision making for this patient she is now full code.    Shock liver is improving, as are her coags.  She is still on too much pressor to feed.  Prognosis remains guarded at best.      Critical care time spent reviewing labs/films, examining patient, collaborating with other physicians but excluding procedures for life threatening organ failure is 48 minutes.      Jeremy Argueta MD

## 2024-06-23 NOTE — CONSULTS
Pharmacist reviewed patient's med profile and PTA med list.  Found no meds that needed adjustment except Zosyn.  Will change Zosyn to 4.5 g IV (over 4 hours) q8h for CRRT per extended infusion protocol.    Js Lee, PharmD BCOP 6/22/2024 8:50 PM

## 2024-06-23 NOTE — PROGRESS NOTES
Lots of bedside and telephone conversations this morning between family, legal guardian, MDs, and RN staff.  Eventually, bedside meeting occurred with all parties.  Family and guardian came to the agreement that because of the court order, decisions and consent would come from the legal guardian, but those decisions would be reflective of the will of the patient's daughters.  MDs changed code status and updated orders.

## 2024-06-23 NOTE — PROGRESS NOTES
[facility] Progress Note           Subjective/   64 y.o. year old female who we are seeing in consultation for acute kidney injury.  Patient has a history of chronic kidney disease stage IV.  Had PEA arrest on presentation.  Patient remains on 3 IV pressors.  Intubated sedated on mechanical ventilator.  On CRRT.  Was net positive by 5.9 L yesterday and net positive by 477 mL since midnight.    Objective/   GEN:  Chronically ill, /61   Pulse 77   Temp (!) 95.7 °F (35.4 °C) (Bladder)   Resp 12   Ht 1.6 m (5' 3\")   Wt (!) 143.4 kg (316 lb 2.2 oz)   LMP  (LMP Unknown) Comment: unsure  SpO2 95%   BMI 56.00 kg/m²   HEENT: non-icteric, no JVD  CV: S1, S2 without m/r/g; 1+ edema  RESP: Coarse breath sounds bilaterally  ABD: Soft  SKIN: warm, no rashes    Data/  Recent Labs     06/22/24  0318 06/22/24  0617 06/23/24  0640   WBC 2.4* 2.4* 10.8   HGB 9.0* 9.1* 9.3*   HCT 27.8* 27.9* 27.3*   MCV 95.7 94.8 94.7    146 100*     Recent Labs     06/22/24  0617 06/22/24  1820 06/23/24  0039 06/23/24  0640   * 136 137 137   K 5.1 5.8* 5.6* 5.5*   CL 96* 100 100 100   CO2 16* 15* 17* 22   GLUCOSE 148* 86 104* 114*   PHOS 7.3* 5.9* 6.0*  --    MG 2.20 2.00  --  2.50*   BUN 84* 88* 87* 56*   CREATININE 6.6* 7.2* 6.6* 4.0*   LABGLOM 6* 6* 6* 12*       Assessment/     Acute kidney injury stage III likely ischemic ATN in the setting of prolonged PEA arrest  -Oliguric had only 160 mL output charted since midnight.  Chronic kidney disease stage V secondary to hypertensive nephrosclerosis creatinine baseline around 4.7 mg/dL.  Following with Dr. Box  -Per previous documentation patient is not interested in renal replacement therapy.    -On 6/22 discussed with both patient's daughter at bedside, with the patient and legal guardian Liza over the phone, patient's room nurse and intensivist team.  I shared with them about patient's wishes in the past that she did not want renal

## 2024-06-23 NOTE — PROCEDURES
PROCEDURE NOTE  Date: 6/22/2024   Name: Marcela Colby  YOB: 1960    Temporary hemodialysis catheter placement    Date/Time: 6/22/2024 11:01 PM    Performed by: Jesús Kincaid DO  Authorized by: Phan Vang MD  Consent: Written consent obtained.  Consent given by: guardian  Required items: required blood products, implants, devices, and special equipment available  Patient identity confirmed: verbally with patient and hospital-assigned identification number  Time out: Immediately prior to procedure a \"time out\" was called to verify the correct patient, procedure, equipment, support staff and site/side marked as required.  Indications: vascular access  Anesthesia: see MAR for details    Sedation:  Patient sedated: yes  Sedatives: propofol and fentanyl  Analgesia: fentanyl  Vitals: Vital signs were monitored during sedation.    Preparation: skin prepped with 2% chlorhexidine  Skin prep agent dried: skin prep agent completely dried prior to procedure  Sterile barriers: all five maximum sterile barriers used - cap, mask, sterile gown, sterile gloves, and large sterile sheet  Hand hygiene: hand hygiene performed prior to central venous catheter insertion  Location details: right internal jugular  Patient position: flat  Catheter type: triple lumen  Catheter size: 13.  Pre-procedure: landmarks identified  Ultrasound guidance: yes  Sterile ultrasound techniques: sterile gel and sterile probe covers were used  Number of attempts: 2  Successful placement: yes  Post-procedure: line sutured and dressing applied  Assessment: blood return through all ports, no pneumothorax on x-ray, free fluid flow and placement verified by x-ray  Patient tolerance: patient tolerated the procedure well with no immediate complications  Comments: EBL 15ml            Jesús Kincaid DO  PGY2, General Surgery  06/22/24  11:02 PM  [employer]  Pager: [phone]

## 2024-06-23 NOTE — PROGRESS NOTES
Cardiology Consult Service  Daily Progress Note        Admit Date:  6/21/2024    Subjective:     Interval history:  Remains intubated    Objective:     Medications:   levothyroxine  50 mcg Oral Daily    [START ON 6/24/2024] calcitRIOL  0.5 mcg Oral Q MWF    folic acid  1 mg Oral Daily    lansoprazole  30 mg Orogastric QAM AC    polyvinyl alcohol  1 drop Both Eyes Q4H    piperacillin-tazobactam  4,500 mg IntraVENous Q8H    lactated ringers  500 mL IntraVENous Once    sodium chloride flush  5-40 mL IntraVENous 2 times per day    heparin (porcine)  5,000 Units SubCUTAneous 3 times per day    vancomycin (VANCOCIN) intermittent dosing (placeholder)   Other RX Placeholder       IV drips:   fentaNYL 100 mcg/hr (06/23/24 0221)    propofol 25 mcg/kg/min (06/23/24 0952)    prismaSATE BGK 4/2.5 1,400 mL/hr at 06/23/24 1117    prismaSATE BGK 4/2.5 1,400 mL/hr at 06/23/24 1117    prismaSATE BGK 4/2.5 1,400 mL/hr at 06/23/24 1117    sodium chloride 100 mL/hr at 06/23/24 1116    sodium chloride      EPINEPHrine 0.24 mcg/kg/min (06/23/24 1314)    sodium chloride      norepinephrine 40 mcg/min (06/23/24 0948)    VASOpressin 20 Units in sodium chloride 0.9 % 100 mL infusion 0.03 Units/min (06/23/24 1036)       PRN:  technetium albumin aggregated, sodium chloride flush **AND** sodium chloride flush, sodium chloride, sodium chloride flush, sodium chloride, ondansetron **OR** ondansetron, polyethylene glycol, acetaminophen **OR** acetaminophen    Vitals:    06/23/24 1215 06/23/24 1230 06/23/24 1245 06/23/24 1300   BP:       Pulse: 77 76 77 76   Resp: 12 11 10 11   Temp:    (!) 96.1 °F (35.6 °C)   TempSrc:    Bladder   SpO2:       Weight:       Height:           Intake/Output Summary (Last 24 hours) at 6/23/2024 1318  Last data filed at 6/23/2024 1300  Gross per 24 hour   Intake 7706.26 ml   Output 3710 ml   Net 3996.26 ml     I/O last 3 completed shifts:  In: 9066 [I.V.:7765.7; Blood:475; IV Piggyback:825.2]  Out: 2070 [Urine:515]  Wt  blood pressure at this time  Will follow    I have personally reviewed the reports and images of labs, radiological studies, cardiac studies including ECG's and telemetry, current and old medical records. The note was completed using EMR and Dragon dictation system. Every effort was made to ensure accuracy; however, inadvertent computerized transcription errors may be present.      Thank you for allowing to us to participate in the chidi or Marcela Colby. Please call our service with questions.    Cristiane Colbert MD, FACC  [facility] [place] [address] [address]  Ph: [phone]  Fax: [phone]

## 2024-06-23 NOTE — PROGRESS NOTES
[facility] -  Clinical Pharmacy Note    Vancomycin - Management by Pharmacy    Consult Date(s): 6/21/24  Consulting Provider(s):  Dr Hammer    Assessment / Plan  PNA, empiric LE cellulitis - Vancomycin  Concurrent Antimicrobials: Zosyn  Day of Vanc Therapy / Ordered Duration: Day 3   Current Dosing Method: Intermittent Dosing by Levels  Therapeutic Goal: Trough ~ 15 mg/L  Current Dose / Plan:   Patient with CKD-stage 5.  Nephrology started CRRT yesterday evening.  Will dose vancomycin intermittently via levels  Level today = 9.3 mg/L  Will re-dose with 1500mg IV x1 today    Plan to obtain a level 6/24 AM and reassess.     Will continue to monitor clinical condition and make adjustments to regimen as appropriate.    Thank you for consulting pharmacy.    Milvia Fields PharmD., BCPS   6/23/2024 8:12 AM  Wireless: [phone]          Interval update:   Patient started on CRRT yesterday evening per family.  Patient remains intubated and sedated, requiring multiple pressors.   PNA molecular panel with H.flu and Moraxella.      Subjective/Objective:   Marcela Colby is a 64 y.o. female with a PMHx significant for HLD, HTN, venous thrombosis and embolism, chronic lymphedema, hypothyroidism, Vascular dementia, CKD stage-V.  Presented from NH with SOB and lower leg pain/swelling.  PEA cardiac arrest in ED and achieved ROSC after several rounds of CPR. Admitted 6/21 to ICU with aspiration pneumonia vs LE cellulitis, septic shock, possible PE.        Pharmacy is consulted to dose vancomycin.    Ht Readings from Last 1 Encounters:   06/22/24 1.6 m (5' 3\")     Wt Readings from Last 1 Encounters:   06/23/24 (!) 143.4 kg (316 lb 2.2 oz)     Current & Prior Antimicrobial Regimen(s):   (6/21)  Zosyn (6/21--current)  Vancomycin - pharmacy to dose  Intermittent via levels    Date Vanc Level Vanc Dose Notes   6/21  2000mg    6/22 22.7 mg/L -- CRRT started in evening   6/23 9.3 mg/L Ordered 1500mg    6/24 Ordered         Cultures

## 2024-06-23 NOTE — PLAN OF CARE
Problem: Safety - Adult  Goal: Free from fall injury  6/23/2024 0109 by Ric Davenport, RN  Outcome: Progressing     Problem: Discharge Planning  Goal: Discharge to home or other facility with appropriate resources  Outcome: Progressing     Problem: Pain  Goal: Verbalizes/displays adequate comfort level or baseline comfort level  Outcome: Progressing     Problem: Safety - Medical Restraint  Goal: Remains free of injury from restraints (Restraint for Interference with Medical Device)  Description: INTERVENTIONS:  1. Determine that other, less restrictive measures have been tried or would not be effective before applying the restraint  2. Evaluate the patient's condition at the time of restraint application  3. Inform patient/family regarding the reason for restraint  4. Q2H: Monitor safety, psychosocial status, comfort, nutrition and hydration  6/23/2024 0109 by Ric Davenport, RN  Outcome: Progressing     Problem: Skin/Tissue Integrity  Goal: Absence of new skin breakdown  Description: 1.  Monitor for areas of redness and/or skin breakdown  2.  Assess vascular access sites hourly  3.  Every 4-6 hours minimum:  Change oxygen saturation probe site  4.  Every 4-6 hours:  If on nasal continuous positive airway pressure, respiratory therapy assess nares and determine need for appliance change or resting period.  6/23/2024 0109 by Ric Davenport, RN  Outcome: Progressing

## 2024-06-23 NOTE — PROCEDURES
INTERPRETATION:  This more than 2-hour, rapid 8-channel EEG recording is abnormal.  It showed mild degree of generalized slowing background activity.  No potentially epileptiform activity was present during the recording.       The EEG findings were consistent with mild degree of generalized non-specific cerebral dysfunction.     CLASSIFICATION:  Dysrhythmia grade 1, generalized.     DESCRIPTION:  BACKGROUND:  The EEG background showed continuous generalized low amplitude intermixed 4 to 11 Hz theta/alpha activity.  The EEG showed fair variability and reactivity.       INTERICTAL DISCHARGES: None     SEIZURE BURDEN: 0%

## 2024-06-23 NOTE — PROGRESS NOTES
AM. INDICATION: foot pain COMPARISON: None TECHNIQUE: Standard per department protocol. VIEWS OBTAINED: 4 FINDINGS: No acute fracture or dislocation/subluxation. Plantar calcaneal spur. Joint spaces intact. Dorsal forefoot soft tissue swelling.     No acute osseous abnormality. Dorsal forefoot soft tissue swelling. Electronically signed by Jeremy Monroe      CBC:   Recent Labs     06/22/24  0318 06/22/24  0617 06/23/24  0640   WBC 2.4* 2.4* 10.8   HGB 9.0* 9.1* 9.3*    146 100*     BMP:    Recent Labs     06/22/24  1820 06/23/24  0039 06/23/24  0640    137 137   K 5.8* 5.6* 5.5*    100 100   CO2 15* 17* 22   BUN 88* 87* 56*   CREATININE 7.2* 6.6* 4.0*   GLUCOSE 86 104* 114*     Hepatic:   Recent Labs     06/22/24  1238 06/22/24  1820 06/23/24  0640   AST 1,669* 1,251* 690*   ALT 1,340* 1,237* 899*   BILITOT 0.6 0.7 1.1*   ALKPHOS 35* 40 74     Lipids: No results found for: \"CHOL\", \"HDL\", \"TRIG\"  Hemoglobin A1C: No results found for: \"LABA1C\"  TSH:   Lab Results   Component Value Date/Time    TSH 1.02 06/22/2024 12:39 PM     Troponin: No results found for: \"TROPONINT\"  Lactic Acid:   Recent Labs     06/22/24 2013 06/23/24  0310 06/23/24  0640   LACTA 3.4* 3.4* 3.7*     BNP:   Recent Labs     06/21/24  1619   PROBNP 13,629*     UA:  Lab Results   Component Value Date/Time    NITRU Negative 06/22/2024 03:23 AM    COLORU Yellow 06/22/2024 03:23 AM    PHUR 6.0 06/22/2024 03:23 AM    WBCUA 0-2 06/22/2024 03:23 AM    RBCUA 11-20 06/22/2024 03:23 AM    MUCUS 2+ 06/22/2024 03:23 AM    BACTERIA 4+ 06/22/2024 03:23 AM    CLARITYU Clear 06/22/2024 03:23 AM    LEUKOCYTESUR Negative 06/22/2024 03:23 AM    UROBILINOGEN 0.2 06/22/2024 03:23 AM    BILIRUBINUR Negative 06/22/2024 03:23 AM    BLOODU LARGE 06/22/2024 03:23 AM    GLUCOSEU Negative 06/22/2024 03:23 AM    KETUA TRACE 06/22/2024 03:23 AM     Urine Cultures: No results found for: \"LABURIN\"  Blood Cultures: No results found for: \"BC\"  No results  found for: \"BLOODCULT2\"  Organism:   Lab Results   Component Value Date/Time    ORG Haemophilus influenzae DNA Detected 06/22/2024 12:00 PM    ORG Moraxella catarrhalis BY DNA 06/22/2024 12:00 PM         Electronically signed by Elena Broussard MD on 6/23/2024 at 9:17 AM

## 2024-06-23 NOTE — PLAN OF CARE
Problem: Safety - Adult  Goal: Free from fall injury  Outcome: Progressing  Note: Fall risk protocol in place: Bed alarm on, fall risk bracelet applied, yellow indicator in hallway on, non-skid footwear in use.  Patient/family educated on fall risk protocol, instructed to call for assistance when needed.     Problem: Safety - Medical Restraint  Goal: Remains free of injury from restraints (Restraint for Interference with Medical Device)  Description: INTERVENTIONS:  1. Determine that other, less restrictive measures have been tried or would not be effective before applying the restraint  2. Evaluate the patient's condition at the time of restraint application  3. Inform patient/family regarding the reason for restraint  4. Q2H: Monitor safety, psychosocial status, comfort, nutrition and hydration  Outcome: Progressing  Flowsheets  Taken 6/23/2024 1800  Remains free of injury from restraints (restraint for interference with medical device):   Determine that other, less restrictive measures have been tried or would not be effective before applying the restraint   Evaluate the patient's condition at the time of restraint application   Inform patient/family regarding the reason for restraint   Every 2 hours: Monitor safety, psychosocial status, comfort, nutrition and hydration  Taken 6/23/2024 1600  Remains free of injury from restraints (restraint for interference with medical device):   Determine that other, less restrictive measures have been tried or would not be effective before applying the restraint   Evaluate the patient's condition at the time of restraint application   Inform patient/family regarding the reason for restraint   Every 2 hours: Monitor safety, psychosocial status, comfort, nutrition and hydration  Taken 6/23/2024 1400  Remains free of injury from restraints (restraint for interference with medical device):   Determine that other, less restrictive measures have been tried or would not be

## 2024-06-24 PROBLEM — J96.01 ACUTE RESPIRATORY FAILURE WITH HYPOXIA AND HYPERCAPNIA (HCC): Status: ACTIVE | Noted: 2024-06-24

## 2024-06-24 PROBLEM — Z51.5 ENCOUNTER FOR PALLIATIVE CARE: Status: ACTIVE | Noted: 2024-06-24

## 2024-06-24 PROBLEM — J96.02 ACUTE RESPIRATORY FAILURE WITH HYPOXIA AND HYPERCAPNIA (HCC): Status: ACTIVE | Noted: 2024-06-24

## 2024-06-24 LAB
ALBUMIN SERPL-MCNC: 2.5 G/DL (ref 3.4–5)
ALBUMIN SERPL-MCNC: 2.6 G/DL (ref 3.4–5)
ALBUMIN/GLOB SERPL: 0.8 {RATIO} (ref 1.1–2.2)
ALBUMIN/GLOB SERPL: 0.9 {RATIO} (ref 1.1–2.2)
ALP SERPL-CCNC: 79 U/L (ref 40–129)
ALP SERPL-CCNC: 84 U/L (ref 40–129)
ALT SERPL-CCNC: 655 U/L (ref 10–40)
ALT SERPL-CCNC: 848 U/L (ref 10–40)
ANION GAP SERPL CALCULATED.3IONS-SCNC: 10 MMOL/L (ref 3–16)
ANION GAP SERPL CALCULATED.3IONS-SCNC: 10 MMOL/L (ref 3–16)
AST SERPL-CCNC: 199 U/L (ref 15–37)
AST SERPL-CCNC: 360 U/L (ref 15–37)
BACTERIA SPEC RESP CULT: NORMAL
BASE EXCESS BLDA CALC-SCNC: -2.5 MMOL/L (ref -3–3)
BASE EXCESS BLDA CALC-SCNC: -2.6 MMOL/L (ref -3–3)
BASOPHILS # BLD: 0 K/UL (ref 0–0.2)
BASOPHILS NFR BLD: 0 %
BILIRUB SERPL-MCNC: 0.9 MG/DL (ref 0–1)
BILIRUB SERPL-MCNC: 1.1 MG/DL (ref 0–1)
BUN SERPL-MCNC: 10 MG/DL (ref 7–20)
BUN SERPL-MCNC: 19 MG/DL (ref 7–20)
CA-I BLD-SCNC: 1.05 MMOL/L (ref 1.12–1.32)
CA-I BLD-SCNC: 1.06 MMOL/L (ref 1.12–1.32)
CA-I BLD-SCNC: 1.07 MMOL/L (ref 1.12–1.32)
CALCIUM SERPL-MCNC: 7.9 MG/DL (ref 8.3–10.6)
CALCIUM SERPL-MCNC: 8 MG/DL (ref 8.3–10.6)
CHLORIDE SERPL-SCNC: 102 MMOL/L (ref 99–110)
CHLORIDE SERPL-SCNC: 103 MMOL/L (ref 99–110)
CO2 BLDA-SCNC: 26 MMOL/L
CO2 BLDA-SCNC: 26 MMOL/L
CO2 SERPL-SCNC: 22 MMOL/L (ref 21–32)
CO2 SERPL-SCNC: 23 MMOL/L (ref 21–32)
COHGB MFR BLDA: 0.8 % (ref 0–1.5)
COHGB MFR BLDA: 0.8 % (ref 0–1.5)
CREAT SERPL-MCNC: 1.2 MG/DL (ref 0.6–1.2)
CREAT SERPL-MCNC: 1.6 MG/DL (ref 0.6–1.2)
DACRYOCYTES BLD QL SMEAR: ABNORMAL
DEPRECATED RDW RBC AUTO: 15.4 % (ref 12.4–15.4)
DOHLE BOD BLD QL SMEAR: PRESENT
EOSINOPHIL # BLD: 0.1 K/UL (ref 0–0.6)
EOSINOPHIL NFR BLD: 1 %
GFR SERPLBLD CREATININE-BSD FMLA CKD-EPI: 35 ML/MIN/{1.73_M2}
GFR SERPLBLD CREATININE-BSD FMLA CKD-EPI: 50 ML/MIN/{1.73_M2}
GLUCOSE SERPL-MCNC: 79 MG/DL (ref 70–99)
GLUCOSE SERPL-MCNC: 90 MG/DL (ref 70–99)
GRAM STN SPEC: NORMAL
HCO3 BLDA-SCNC: 24 MMOL/L (ref 21–29)
HCO3 BLDA-SCNC: 25 MMOL/L (ref 21–29)
HCT VFR BLD AUTO: 25.1 % (ref 36–48)
HGB BLD-MCNC: 8.6 G/DL (ref 12–16)
HGB BLDA-MCNC: 8.4 G/DL
HGB BLDA-MCNC: 9.8 G/DL
LACTATE BLDV-SCNC: 1.7 MMOL/L (ref 0.4–2)
LYMPHOCYTES # BLD: 0 K/UL (ref 1–5.1)
LYMPHOCYTES NFR BLD: 0 %
MAGNESIUM SERPL-MCNC: 2.2 MG/DL (ref 1.8–2.4)
MAGNESIUM SERPL-MCNC: 2.3 MG/DL (ref 1.8–2.4)
MCH RBC QN AUTO: 32.4 PG (ref 26–34)
MCHC RBC AUTO-ENTMCNC: 34.4 G/DL (ref 31–36)
MCV RBC AUTO: 94 FL (ref 80–100)
METAMYELOCYTES NFR BLD MANUAL: 1 %
METHGB MFR BLDA: 0.6 % (ref 0–1.4)
METHGB MFR BLDA: 1 % (ref 0–1.4)
MONOCYTES # BLD: 0.1 K/UL (ref 0–1.3)
MONOCYTES NFR BLD: 2 %
NEUTROPHILS # BLD: 6.8 K/UL (ref 1.7–7.7)
NEUTROPHILS NFR BLD: 88 %
NEUTS BAND NFR BLD MANUAL: 8 % (ref 0–7)
NRBC BLD-RTO: 4 /100 WBC
PATH INTERP BLD-IMP: NO
PATH INTERP BLD-IMP: NORMAL
PCO2 BLDA: 51.3 MMHG (ref 35–45)
PCO2 BLDA: 53.2 MMHG (ref 35–45)
PH BLDA: 7.27 [PH] (ref 7.35–7.45)
PH BLDA: 7.28 [PH] (ref 7.35–7.45)
PH VENOUS: 7.31 (ref 7.35–7.45)
PH VENOUS: 7.31 (ref 7.35–7.45)
PH VENOUS: 7.34 (ref 7.35–7.45)
PHOSPHATE SERPL-MCNC: 1.4 MG/DL (ref 2.5–4.9)
PHOSPHATE SERPL-MCNC: 2 MG/DL (ref 2.5–4.9)
PLATELET # BLD AUTO: 73 K/UL (ref 135–450)
PLATELET BLD QL SMEAR: ABNORMAL
PMV BLD AUTO: 9 FL (ref 5–10.5)
PO2 BLDA: 132 MMHG (ref 75–108)
PO2 BLDA: 182 MMHG (ref 75–108)
POTASSIUM SERPL-SCNC: 4.1 MMOL/L (ref 3.5–5.1)
POTASSIUM SERPL-SCNC: 4.7 MMOL/L (ref 3.5–5.1)
PROT SERPL-MCNC: 5.5 G/DL (ref 6.4–8.2)
PROT SERPL-MCNC: 5.6 G/DL (ref 6.4–8.2)
RBC # BLD AUTO: 2.67 M/UL (ref 4–5.2)
SAO2 % BLDA: 100 % (ref 93–100)
SAO2 % BLDA: 99 % (ref 93–100)
SLIDE REVIEW: ABNORMAL
SMUDGE CELLS BLD QL SMEAR: PRESENT
SODIUM SERPL-SCNC: 134 MMOL/L (ref 136–145)
SODIUM SERPL-SCNC: 136 MMOL/L (ref 136–145)
TRIGL SERPL-MCNC: 1318 MG/DL (ref 0–150)
TRIGL SERPL-MCNC: 851 MG/DL (ref 0–150)
VANCOMYCIN SERPL-MCNC: 13.1 UG/ML
WBC # BLD AUTO: 7 K/UL (ref 4–11)

## 2024-06-24 PROCEDURE — 36415 COLL VENOUS BLD VENIPUNCTURE: CPT

## 2024-06-24 PROCEDURE — 80053 COMPREHEN METABOLIC PANEL: CPT

## 2024-06-24 PROCEDURE — 2580000003 HC RX 258: Performed by: EMERGENCY MEDICINE

## 2024-06-24 PROCEDURE — 99221 1ST HOSP IP/OBS SF/LOW 40: CPT | Performed by: NURSE PRACTITIONER

## 2024-06-24 PROCEDURE — 2500000003 HC RX 250 WO HCPCS

## 2024-06-24 PROCEDURE — 99291 CRITICAL CARE FIRST HOUR: CPT | Performed by: INTERNAL MEDICINE

## 2024-06-24 PROCEDURE — 82803 BLOOD GASES ANY COMBINATION: CPT

## 2024-06-24 PROCEDURE — 37799 UNLISTED PX VASCULAR SURGERY: CPT

## 2024-06-24 PROCEDURE — 2000000000 HC ICU R&B

## 2024-06-24 PROCEDURE — 99223 1ST HOSP IP/OBS HIGH 75: CPT | Performed by: SURGERY

## 2024-06-24 PROCEDURE — 6370000000 HC RX 637 (ALT 250 FOR IP)

## 2024-06-24 PROCEDURE — 2580000003 HC RX 258

## 2024-06-24 PROCEDURE — 6360000002 HC RX W HCPCS

## 2024-06-24 PROCEDURE — 2580000003 HC RX 258: Performed by: INTERNAL MEDICINE

## 2024-06-24 PROCEDURE — 83605 ASSAY OF LACTIC ACID: CPT

## 2024-06-24 PROCEDURE — 99233 SBSQ HOSP IP/OBS HIGH 50: CPT | Performed by: INTERNAL MEDICINE

## 2024-06-24 PROCEDURE — 94003 VENT MGMT INPAT SUBQ DAY: CPT

## 2024-06-24 PROCEDURE — 2700000000 HC OXYGEN THERAPY PER DAY

## 2024-06-24 PROCEDURE — 84100 ASSAY OF PHOSPHORUS: CPT

## 2024-06-24 PROCEDURE — 83735 ASSAY OF MAGNESIUM: CPT

## 2024-06-24 PROCEDURE — 94761 N-INVAS EAR/PLS OXIMETRY MLT: CPT

## 2024-06-24 PROCEDURE — 84478 ASSAY OF TRIGLYCERIDES: CPT

## 2024-06-24 PROCEDURE — 90945 DIALYSIS ONE EVALUATION: CPT

## 2024-06-24 PROCEDURE — 2500000003 HC RX 250 WO HCPCS: Performed by: INTERNAL MEDICINE

## 2024-06-24 PROCEDURE — 6360000002 HC RX W HCPCS: Performed by: EMERGENCY MEDICINE

## 2024-06-24 PROCEDURE — 85025 COMPLETE CBC W/AUTO DIFF WBC: CPT

## 2024-06-24 PROCEDURE — 80202 ASSAY OF VANCOMYCIN: CPT

## 2024-06-24 RX ORDER — CALCIUM GLUCONATE 10 MG/ML
1000 INJECTION, SOLUTION INTRAVENOUS PRN
Status: DISCONTINUED | OUTPATIENT
Start: 2024-06-24 | End: 2024-06-24 | Stop reason: SDUPTHER

## 2024-06-24 RX ORDER — SODIUM CHLORIDE 0.9 % (FLUSH) 0.9 %
1.1-1.9 SYRINGE (ML) INJECTION PRN
Status: DISCONTINUED | OUTPATIENT
Start: 2024-06-24 | End: 2024-06-24 | Stop reason: SDUPTHER

## 2024-06-24 RX ORDER — CALCIUM GLUCONATE 20 MG/ML
4000 INJECTION, SOLUTION INTRAVENOUS PRN
Status: DISCONTINUED | OUTPATIENT
Start: 2024-06-24 | End: 2024-06-24 | Stop reason: SDUPTHER

## 2024-06-24 RX ORDER — MAGNESIUM SULFATE IN WATER 40 MG/ML
2000 INJECTION, SOLUTION INTRAVENOUS PRN
Status: DISCONTINUED | OUTPATIENT
Start: 2024-06-24 | End: 2024-06-24

## 2024-06-24 RX ORDER — CALCIUM GLUCONATE 10 MG/ML
3000 INJECTION, SOLUTION INTRAVENOUS PRN
Status: DISCONTINUED | OUTPATIENT
Start: 2024-06-24 | End: 2024-06-27 | Stop reason: ALTCHOICE

## 2024-06-24 RX ORDER — SODIUM CHLORIDE 0.9 % (FLUSH) 0.9 %
1.1-1.9 SYRINGE (ML) INJECTION PRN
Status: DISCONTINUED | OUTPATIENT
Start: 2024-06-24 | End: 2024-06-30 | Stop reason: HOSPADM

## 2024-06-24 RX ORDER — CALCIUM GLUCONATE 10 MG/ML
3000 INJECTION, SOLUTION INTRAVENOUS PRN
Status: DISCONTINUED | OUTPATIENT
Start: 2024-06-24 | End: 2024-06-24 | Stop reason: SDUPTHER

## 2024-06-24 RX ORDER — CALCIUM GLUCONATE 10 MG/ML
1000 INJECTION, SOLUTION INTRAVENOUS ONCE
Status: COMPLETED | OUTPATIENT
Start: 2024-06-24 | End: 2024-06-24

## 2024-06-24 RX ORDER — POTASSIUM CHLORIDE 7.45 MG/ML
10 INJECTION INTRAVENOUS PRN
Status: DISCONTINUED | OUTPATIENT
Start: 2024-06-24 | End: 2024-06-24

## 2024-06-24 RX ORDER — MAGNESIUM SULFATE 1 G/100ML
1000 INJECTION INTRAVENOUS PRN
Status: DISCONTINUED | OUTPATIENT
Start: 2024-06-24 | End: 2024-06-27 | Stop reason: ALTCHOICE

## 2024-06-24 RX ORDER — POTASSIUM CHLORIDE 29.8 MG/ML
20 INJECTION INTRAVENOUS PRN
Status: DISCONTINUED | OUTPATIENT
Start: 2024-06-24 | End: 2024-06-24

## 2024-06-24 RX ORDER — HALOPERIDOL 5 MG/ML
5 INJECTION INTRAMUSCULAR ONCE
Status: DISCONTINUED | OUTPATIENT
Start: 2024-06-25 | End: 2024-06-25

## 2024-06-24 RX ORDER — POTASSIUM CHLORIDE 29.8 MG/ML
20 INJECTION INTRAVENOUS PRN
Status: DISCONTINUED | OUTPATIENT
Start: 2024-06-24 | End: 2024-06-27 | Stop reason: ALTCHOICE

## 2024-06-24 RX ORDER — PROPOFOL 10 MG/ML
INJECTION, EMULSION INTRAVENOUS
Status: COMPLETED
Start: 2024-06-24 | End: 2024-06-24

## 2024-06-24 RX ORDER — CALCIUM GLUCONATE 20 MG/ML
2000 INJECTION, SOLUTION INTRAVENOUS PRN
Status: DISCONTINUED | OUTPATIENT
Start: 2024-06-24 | End: 2024-06-27 | Stop reason: ALTCHOICE

## 2024-06-24 RX ORDER — POTASSIUM CHLORIDE 29.8 MG/ML
20 INJECTION INTRAVENOUS PRN
Status: DISCONTINUED | OUTPATIENT
Start: 2024-06-24 | End: 2024-06-24 | Stop reason: SDUPTHER

## 2024-06-24 RX ORDER — CALCIUM GLUCONATE 10 MG/ML
1000 INJECTION, SOLUTION INTRAVENOUS PRN
Status: DISCONTINUED | OUTPATIENT
Start: 2024-06-24 | End: 2024-06-27 | Stop reason: ALTCHOICE

## 2024-06-24 RX ORDER — CALCIUM GLUCONATE 20 MG/ML
4000 INJECTION, SOLUTION INTRAVENOUS PRN
Status: DISCONTINUED | OUTPATIENT
Start: 2024-06-24 | End: 2024-06-27 | Stop reason: ALTCHOICE

## 2024-06-24 RX ORDER — CALCIUM GLUCONATE 20 MG/ML
2000 INJECTION, SOLUTION INTRAVENOUS PRN
Status: DISCONTINUED | OUTPATIENT
Start: 2024-06-24 | End: 2024-06-24 | Stop reason: SDUPTHER

## 2024-06-24 RX ORDER — MAGNESIUM SULFATE 1 G/100ML
1000 INJECTION INTRAVENOUS PRN
Status: DISCONTINUED | OUTPATIENT
Start: 2024-06-24 | End: 2024-06-24 | Stop reason: SDUPTHER

## 2024-06-24 RX ADMIN — PROPOFOL 25 MCG/KG/MIN: 10 INJECTION, EMULSION INTRAVENOUS at 02:14

## 2024-06-24 RX ADMIN — POLYVINYL ALCOHOL 1 DROP: 1.4 SOLUTION/ DROPS OPHTHALMIC at 13:03

## 2024-06-24 RX ADMIN — LANSOPRAZOLE 30 MG: 30 TABLET, ORALLY DISINTEGRATING, DELAYED RELEASE ORAL at 08:26

## 2024-06-24 RX ADMIN — HEPARIN SODIUM 5000 UNITS: 5000 INJECTION INTRAVENOUS; SUBCUTANEOUS at 15:13

## 2024-06-24 RX ADMIN — POLYVINYL ALCOHOL 1 DROP: 1.4 SOLUTION/ DROPS OPHTHALMIC at 19:30

## 2024-06-24 RX ADMIN — Medication 100 MCG/HR: at 04:55

## 2024-06-24 RX ADMIN — FOLIC ACID 1 MG: 1 TABLET ORAL at 08:25

## 2024-06-24 RX ADMIN — VASOPRESSIN 0.03 UNITS/MIN: 20 INJECTION INTRAVENOUS at 07:33

## 2024-06-24 RX ADMIN — PIPERACILLIN AND TAZOBACTAM 4500 MG: 4; .5 INJECTION, POWDER, LYOPHILIZED, FOR SOLUTION INTRAVENOUS at 22:10

## 2024-06-24 RX ADMIN — PIPERACILLIN AND TAZOBACTAM 4500 MG: 4; .5 INJECTION, POWDER, LYOPHILIZED, FOR SOLUTION INTRAVENOUS at 15:18

## 2024-06-24 RX ADMIN — NOREPINEPHRINE BITARTRATE 24 MCG/MIN: 1 INJECTION, SOLUTION, CONCENTRATE INTRAVENOUS at 08:52

## 2024-06-24 RX ADMIN — Medication: at 20:05

## 2024-06-24 RX ADMIN — Medication: at 16:21

## 2024-06-24 RX ADMIN — CALCIUM GLUCONATE 1000 MG: 10 INJECTION, SOLUTION INTRAVENOUS at 05:14

## 2024-06-24 RX ADMIN — PIPERACILLIN AND TAZOBACTAM 4500 MG: 4; .5 INJECTION, POWDER, LYOPHILIZED, FOR SOLUTION INTRAVENOUS at 06:22

## 2024-06-24 RX ADMIN — NOREPINEPHRINE BITARTRATE 24 MCG/MIN: 1 INJECTION, SOLUTION, CONCENTRATE INTRAVENOUS at 20:29

## 2024-06-24 RX ADMIN — VASOPRESSIN 0.03 UNITS/MIN: 20 INJECTION INTRAVENOUS at 19:20

## 2024-06-24 RX ADMIN — POLYVINYL ALCOHOL 1 DROP: 1.4 SOLUTION/ DROPS OPHTHALMIC at 00:48

## 2024-06-24 RX ADMIN — PROPOFOL 25 MCG/KG/MIN: 10 INJECTION, EMULSION INTRAVENOUS at 05:52

## 2024-06-24 RX ADMIN — Medication: at 05:24

## 2024-06-24 RX ADMIN — POLYVINYL ALCOHOL 1 DROP: 1.4 SOLUTION/ DROPS OPHTHALMIC at 04:05

## 2024-06-24 RX ADMIN — HEPARIN SODIUM 5000 UNITS: 5000 INJECTION INTRAVENOUS; SUBCUTANEOUS at 06:03

## 2024-06-24 RX ADMIN — Medication: at 09:11

## 2024-06-24 RX ADMIN — SODIUM PHOSPHATE, MONOBASIC, MONOHYDRATE AND SODIUM PHOSPHATE, DIBASIC, ANHYDROUS 18 MMOL: 142; 276 INJECTION, SOLUTION INTRAVENOUS at 20:30

## 2024-06-24 RX ADMIN — Medication: at 01:57

## 2024-06-24 RX ADMIN — Medication: at 12:50

## 2024-06-24 RX ADMIN — CALCIUM GLUCONATE 1000 MG: 10 INJECTION, SOLUTION INTRAVENOUS at 11:31

## 2024-06-24 RX ADMIN — PROPOFOL 25 MCG/KG/MIN: 10 INJECTION, EMULSION INTRAVENOUS at 10:45

## 2024-06-24 RX ADMIN — EPINEPHRINE 0.12 MCG/KG/MIN: 1 INJECTION INTRAMUSCULAR; INTRAVENOUS; SUBCUTANEOUS at 01:45

## 2024-06-24 RX ADMIN — Medication: at 05:32

## 2024-06-24 RX ADMIN — POLYVINYL ALCOHOL 1 DROP: 1.4 SOLUTION/ DROPS OPHTHALMIC at 08:28

## 2024-06-24 RX ADMIN — HEPARIN SODIUM 5000 UNITS: 5000 INJECTION INTRAVENOUS; SUBCUTANEOUS at 23:11

## 2024-06-24 RX ADMIN — PROPOFOL 35 MCG/KG/MIN: 10 INJECTION, EMULSION INTRAVENOUS at 19:16

## 2024-06-24 RX ADMIN — POLYVINYL ALCOHOL 1 DROP: 1.4 SOLUTION/ DROPS OPHTHALMIC at 15:13

## 2024-06-24 RX ADMIN — DEXMEDETOMIDINE 0.2 MCG/KG/HR: 200 INJECTION, SOLUTION INTRAVENOUS at 21:08

## 2024-06-24 RX ADMIN — Medication: at 01:56

## 2024-06-24 RX ADMIN — NOREPINEPHRINE BITARTRATE 45 MCG/MIN: 1 INJECTION, SOLUTION, CONCENTRATE INTRAVENOUS at 00:44

## 2024-06-24 RX ADMIN — SODIUM CHLORIDE 1500 MG: 9 INJECTION, SOLUTION INTRAVENOUS at 10:27

## 2024-06-24 RX ADMIN — SODIUM PHOSPHATE, MONOBASIC, MONOHYDRATE AND SODIUM PHOSPHATE, DIBASIC, ANHYDROUS 15 MMOL: 142; 276 INJECTION, SOLUTION INTRAVENOUS at 05:18

## 2024-06-24 RX ADMIN — LEVOTHYROXINE SODIUM 50 MCG: 50 TABLET ORAL at 08:25

## 2024-06-24 RX ADMIN — Medication: at 05:23

## 2024-06-24 RX ADMIN — PROPOFOL 35 MCG/KG/MIN: 10 INJECTION, EMULSION INTRAVENOUS at 15:27

## 2024-06-24 RX ADMIN — CALCIUM GLUCONATE 1000 MG: 10 INJECTION, SOLUTION INTRAVENOUS at 19:47

## 2024-06-24 RX ADMIN — Medication 100 MCG/HR: at 15:25

## 2024-06-24 ASSESSMENT — PULMONARY FUNCTION TESTS
PIF_VALUE: 20
PIF_VALUE: 23
PIF_VALUE: 15
PIF_VALUE: 15
PIF_VALUE: 27
PIF_VALUE: 16
PIF_VALUE: 23
PIF_VALUE: 14
PIF_VALUE: 27
PIF_VALUE: 15
PIF_VALUE: 24
PIF_VALUE: 28
PIF_VALUE: 30
PIF_VALUE: 26
PIF_VALUE: 14
PIF_VALUE: 21
PIF_VALUE: 23
PIF_VALUE: 34
PIF_VALUE: 15
PIF_VALUE: 20
PIF_VALUE: 22
PIF_VALUE: 25
PIF_VALUE: 15
PIF_VALUE: 14
PIF_VALUE: 23
PIF_VALUE: 30
PIF_VALUE: 21
PIF_VALUE: 20
PIF_VALUE: 33
PIF_VALUE: 27
PIF_VALUE: 15
PIF_VALUE: 15
PIF_VALUE: 23
PIF_VALUE: 15
PIF_VALUE: 22
PIF_VALUE: 35
PIF_VALUE: 15
PIF_VALUE: 24
PIF_VALUE: 43
PIF_VALUE: 22
PIF_VALUE: 15
PIF_VALUE: 16
PIF_VALUE: 21
PIF_VALUE: 22
PIF_VALUE: 28
PIF_VALUE: 25
PIF_VALUE: 33
PIF_VALUE: 25
PIF_VALUE: 15
PIF_VALUE: 24
PIF_VALUE: 33

## 2024-06-24 ASSESSMENT — PAIN SCALES - GENERAL
PAINLEVEL_OUTOF10: 0
PAINLEVEL_OUTOF10: 4
PAINLEVEL_OUTOF10: 0
PAINLEVEL_OUTOF10: 3
PAINLEVEL_OUTOF10: 1
PAINLEVEL_OUTOF10: 0
PAINLEVEL_OUTOF10: 0
PAINLEVEL_OUTOF10: 5
PAINLEVEL_OUTOF10: 0
PAINLEVEL_OUTOF10: 0

## 2024-06-24 NOTE — PROGRESS NOTES
ICU Consult Note    Admit Date: 6/21/2024  Hospital Day: 3  ICU Day: 2d 11h  Vent Day: 2  IV Access: Peripheral, Central  IV Fluids: None  Vasopressors: Norepinephrine, Epinephrine, Vasopressin  Antibiotics: Vancomycin, Zosyn (6/21-)  Diet: Diet NPO  PCP: Thalia Au MD  Code Status: Full Code    CC: Respiratory Distress (Pt brought in by EMS from SNF for respiratory distress, CIPAP placed en route )    Interval history:  VSS, NAEO.      mL 24 hours. On CRRT net even.    HPI:  Ms. Marcela Colby is a 64 y.o. female with a MHx significant for CKD V, chronic anemia, HTN, hypothyroidism, history of DVT and PE who presented with respiratory distress.     She initially presented to the ED yesterday with pain in her right leg and foot. She was worked up for RLE DVT, venous doppler unremarkable. She presented later in the day again due to respiratory distress. Arrived on CPAP. Was initially hypertensive, febrile to 102.3F, tachycardic to 108 bpm, tachypneic to 42/min, saturating 100% on 100% FiO2. She had an elevated lactic acid, chest xray suspicious for possible pneumonia. Was being treated for suspected septic shock in the ED, with additional concern for hypervolemia. She was started on vancomycin and cefepime, given 500 mL IVF due to hypervolemia and CKD V history.      While waiting for transit to ICU she went into cardiac arrest, reportedly PEA. Had about 30 minutes of CPR, was intubated, received calcium, 2 amps bicarb, epinephrine pushes. Crash L femoral arterial line placed. After ROSC was achieved she was started on an epinephrine gtt. Sterile R femoral CVC placed in ED.      She was transferred to the ICU for further monitoring and care.     Medications:     Scheduled Meds:    vancomycin  1,500 mg IntraVENous Once    levothyroxine  50 mcg Oral Daily    calcitRIOL  0.5 mcg Oral Q MWF    folic acid  1 mg Oral Daily    lansoprazole  30 mg Orogastric QAM AC    polyvinyl alcohol  1 drop Both Eyes Q4H     systolic function with a visually estimated EF of 35 - 40%. Left ventricle size is normal. Normal wall thickness. Mild global hypokinesis present.    Right Ventricle: Not well visualized. Right ventricle size is normal.    Mitral Valve: Not well visualized. Mild regurgitation.    Tricuspid Valve: Not well visualized. Mild regurgitation.    10/2014 TTE:  Summary:   Overall left ventricular ejection fraction is estimated to be 60-65%.   The left ventricular wall motion is normal.   There is trace mitral regurgitation.   The left atrium is mildly dilated.     PFT:  None on file    RAD:  XR CHEST PORTABLE   Final Result   1. As above.      Electronically signed by Ravinder Rivera MD      CT HEAD WO CONTRAST   Final Result   1. Limited exam secondary to motion artifact.   2. When allowing for motion artifact, there is no CT evidence of any acute ischemia or hemorrhage.      Electronically signed by Ravinder Rivera MD      XR ABDOMEN (KUB) (SINGLE AP VIEW)   Final Result      As above.      Electronically signed by Ravinder Rivera MD      XR CHEST PORTABLE   Final Result   Low positioning of the ET tube.   Cardiomegaly. Central vascular congestion.      Electronically signed by Arielle Menon      CT TIBIA FIBULA RIGHT WO CONTRAST   Final Result   1.   Limited exam patient motion.   2.  Diffuse soft tissue swelling.      Electronically signed by Arielle Menon      CT FEMUR RIGHT WO CONTRAST   Final Result   1.   Limited exam patient motion.   2.  Diffuse soft tissue swelling.      Electronically signed by Arielle Menon      XR CHEST PORTABLE   Final Result   Bilateral infrahilar atelectasis or pneumonia.      Electronically signed by Zenon High          Assessment/Plan:   Marcela Colby is a 64 y.o. female with MHx significant for CKD V, chronic anemia, HTN, history of DVT and PE who presented with respiratory distress.    Neuro  Concern for anoxic brain injury  Acute encephalopathy in setting of

## 2024-06-24 NOTE — PROGRESS NOTES
Cardiology Consult Service  Daily Progress Note        Admit Date:  6/21/2024    Subjective:     Interval history:  Remains intubated    Objective:     Medications:   vancomycin  1,500 mg IntraVENous Once    levothyroxine  50 mcg Oral Daily    calcitRIOL  0.5 mcg Oral Q MWF    folic acid  1 mg Oral Daily    lansoprazole  30 mg Orogastric QAM AC    polyvinyl alcohol  1 drop Both Eyes Q4H    piperacillin-tazobactam  4,500 mg IntraVENous Q8H    sodium chloride flush  5-40 mL IntraVENous 2 times per day    heparin (porcine)  5,000 Units SubCUTAneous 3 times per day    vancomycin (VANCOCIN) intermittent dosing (placeholder)   Other RX Placeholder       IV drips:   fentaNYL 75 mcg/hr (06/24/24 0645)    propofol 25 mcg/kg/min (06/24/24 1045)    prismaSATE BGK 4/2.5 1,400 mL/hr at 06/24/24 0911    prismaSATE BGK 4/2.5 1,400 mL/hr at 06/24/24 0911    prismaSATE BGK 4/2.5 1,400 mL/hr at 06/24/24 0911    EPINEPHrine Stopped (06/24/24 0802)    sodium chloride      norepinephrine 24 mcg/min (06/24/24 1032)    VASOpressin 20 Units in sodium chloride 0.9 % 100 mL infusion 0.03 Units/min (06/24/24 0733)       PRN:  potassium chloride, magnesium sulfate, calcium gluconate **OR** calcium gluconate **OR** calcium gluconate **OR** calcium gluconate, sodium phosphate 6 mmol in sodium chloride 0.9 % 250 mL IVPB **OR** sodium phosphate 12 mmol in sodium chloride 0.9 % 250 mL IVPB **OR** sodium phosphate 18 mmol in sodium chloride 0.9 % 500 mL IVPB **OR** sodium phosphate 24 mmol in sodium chloride 0.9 % 500 mL IVPB, sodium chloride flush **AND** sodium chloride flush, perflutren lipid microspheres, technetium albumin aggregated, sodium chloride flush **AND** sodium chloride flush, sodium chloride flush, sodium chloride, ondansetron **OR** ondansetron, polyethylene glycol, acetaminophen **OR** acetaminophen    Vitals:    06/24/24 1015 06/24/24 1030 06/24/24 1045 06/24/24 1100   BP:       Pulse: 80 85 82 84   Resp: 18 14 16 17   Temp:        TempSrc:       SpO2: 98% 98%  100%   Weight:       Height:           Intake/Output Summary (Last 24 hours) at 6/24/2024 1106  Last data filed at 6/24/2024 1000  Gross per 24 hour   Intake 3872.3 ml   Output 4230 ml   Net -357.7 ml       I/O last 3 completed shifts:  In: 8096.3 [I.V.:7621.3; Blood:475]  Out: 6584 [Urine:422]  Wt Readings from Last 3 Encounters:   06/23/24 (!) 143.4 kg (316 lb 2.2 oz)   06/21/24 (!) 138.8 kg (306 lb 1.6 oz)   09/15/23 (!) 138.8 kg (306 lb)       Admit Wt: Weight - Scale: (!) 141.7 kg (312 lb 6.3 oz)   Todays Wt: Weight - Scale: (!) 143.4 kg (316 lb 2.2 oz)      Physical Exam:     Physical Exam  Constitutional:       Appearance: Normal appearance. She is ill-appearing.   Cardiovascular:      Heart sounds: Normal heart sounds.   Pulmonary:      Breath sounds: Rhonchi present.      Comments: Intubated    Musculoskeletal:      Cervical back: Neck supple.   Neurological:      Mental Status: She is alert.            Labs:   Recent Labs     06/23/24  0640 06/23/24  1854 06/24/24  0324    133* 134*   K 5.5* 5.2* 4.7   BUN 56* 28* 19   CREATININE 4.0* 2.1* 1.6*    101 102   CO2 22 21 22   GLUCOSE 114* 98 90   CALCIUM 7.4* 7.6* 7.9*   MG 2.50* 2.30 2.20       Recent Labs     06/22/24  0617 06/23/24  0640 06/24/24  0324   WBC 2.4* 10.8 7.0   HGB 9.1* 9.3* 8.6*   HCT 27.9* 27.3* 25.1*    100* 73*   MCV 94.8 94.7 94.0       Recent Labs     06/23/24  0640   TRIG 851*     Recent Labs     06/22/24  1239 06/23/24  0043   INR 2.80* 2.38*       No results for input(s): \"CKTOTAL\", \"CKMB\", \"CKMBINDEX\", \"TROPONINI\" in the last 72 hours.  No results for input(s): \"BNP\" in the last 72 hours.  No results for input(s): \"NTPROBNP\" in the last 72 hours.  Recent Labs     06/22/24  1239   TSH 1.02         Imaging:       Assessment & Plan:     Cardiopulmonary arrest  Septic shock  NSTEMI  Systolic heart failure, unknown chronicity, post cardiopulmonary arrest  Acute kidney injury on CKD

## 2024-06-24 NOTE — PROGRESS NOTES
V2.0    [facility] [place]      Name:  Marcela Colby /Age/Sex: 1960  (64 y.o. female)   MRN & CSN:  [id number] & [id number] Encounter Date/Time: 2024 9:13 AM EDT   Location:  [facility]4/4514-01 PCP: Thalia Au MD     Attending:Elena Broussard MD       Hospital Day: 4    Assessment and Recommendations   Marcela Colby is a 64 y.o. female with pmh of CKD stage V, hyperlipidemia, hypertension, dementia, pancytopenia, hypothyroidism who presents with Cardiac arrest (HCC).  Patient was sent for right lower leg pain and swelling.  Had a Doppler which was negative.  Patient came back to the ED with dyspnea shortness of breath.  Patient was placed on BiPAP and was febrile.  Patient then went into PEA cardiac arrest.  RCS was after 20 minutes.  Patient was intubated on admission.  She has septic shock secondary to pneumonia with volume overload LUIS on CKD.  Started on CRRT and pressors.  Pneumonia panel positive for Moraxella H influenza.  Urine for strep pneumonia positive      Plan:     Cardiac arrest  -20 minutes rocs   -PEA cardiac arrest  -Cause of arrest unclear  -EKG sinus tach  -Patient withdraws to pain has a gag.  No purposeful movement yet      Shock-slightly improved from yesterday  -Most likely septic shock  -On 2 pressors  -Wean pressors as tolerated    Sepsis-  -With fever tachycardia and leukopenia.  Improving  -Continue empiric antibiotics    Pneumonia  -Pneumonia panel positive for influenza Moraxella catarrhalis.  Strep pneumonia antigen and urine positive  -ct  antibiotics    Volume overload, acute heart failure reduced EF with anasarca, for dialysis  -Patient's weight at baseline is 308 pound  -No recent echo, last echo in  showed a normal EF, new echo shows a EF of 35 to 40%.  RV normal  -Blood pressure running very low on  pressors.       Right leg swelling with extensive blistering  -Supportive treatment        Acute hypoxic respiratory failure  -Possibly due to volume overload  06/22/2024 03:23 AM    GLUCOSEU Negative 06/22/2024 03:23 AM    KETUA TRACE 06/22/2024 03:23 AM     Urine Cultures: No results found for: \"LABURIN\"  Blood Cultures: No results found for: \"BC\"  No results found for: \"BLOODCULT2\"  Organism:   Lab Results   Component Value Date/Time    ORG Haemophilus influenzae DNA Detected 06/22/2024 12:00 PM    ORG Moraxella catarrhalis BY DNA 06/22/2024 12:00 PM         Electronically signed by Elena Broussard MD on 6/24/2024 at 8:20 AM

## 2024-06-24 NOTE — PROGRESS NOTES
Propofol paused for neuro assessment. Patient moved all extremities non-purposefully, grimaced, gagged on ETT, and opened eyes to voice, but did not track movement in the room.     She was unable to follow commands.  Sedation restarted d/t persistent coughing, gagging, and pulling at restraints.

## 2024-06-24 NOTE — CONSULTS
Vascular Surgery   Resident Consult Note    Reason for Consult: RLE skin changes    History of Present Illness:   Marcela Colby is a 64 y.o. female with Hx of CKD, clotting disorder, HLD, HTN, thyroid disease, vascular dementia, tubal ligation, HAILY/BSO, and hysterectomy. Presented initially on 6/21 for RLE pain and shortness of breath. In the ED, pt arrested and was subsequently intubated and coded. CT scan of the RLE at the time showed chronic lymphedema; however, no signs of necrotizing fasciitis. During her stay here, pt was started on CRRT, and her RLE began to have skin changes. Bullae began to erupt w/ further skin changes as can be seen in the chart below. Vascular surgery was consulted to evaluate vascular supply to RLE. Hx limited by patient's mentation.     Past Medical History:        Diagnosis Date    Anemia     Anxiety     Atelectasis 3/29/2017    CKD (chronic kidney disease)     Clotting disorder (HCC)     Constipation     Depression     Hyperlipidemia     Hypertension     Muscle weakness     Pancytopenia (HCC)     Paranoid schizophrenia (HCC)     Personal history of venous thrombosis and embolism     Thrombocytopenia (HCC)     Thyroid disease     hypothyroidism    Vascular dementia (HCC)        Past Surgical History:           Procedure Laterality Date    DILATION AND CURETTAGE OF UTERUS  03/01/2017    hysterscope and myosure ablation    HYSTERECTOMY (CERVIX STATUS UNKNOWN)      HAILY AND BSO (CERVIX REMOVED)  04/17/2017    Computer-assisted tele-manipulated total hysterectomy, bilateral salpingo-oophorectomy with suture    TUBAL LIGATION  1984       Allergies:  Patient has no known allergies.    Medications:   Home Meds  No current facility-administered medications on file prior to encounter.     Current Outpatient Medications on File Prior to Encounter   Medication Sig Dispense Refill    acetaminophen (TYLENOL) 325 MG tablet Take 2 tablets by mouth every 8 hours as needed for Pain (do not exceed 4  grams in 24 hours)      labetalol (NORMODYNE) 300 MG tablet Take 1 tablet by mouth 2 times daily      methocarbamol (ROBAXIN) 500 MG tablet Take 1 tablet by mouth 3 times daily      Dextromethorphan-guaiFENesin  MG/5ML SYRP Take 10 mLs by mouth every 6 hours as needed for Cough      torsemide (DEMADEX) 20 MG tablet Take 2 tablets by mouth daily      Patiromer Sorbitex Calcium (VELTASSA) 16.8 g PACK Take 1 packet by mouth daily      Epoetin Jaron-epbx (RETACRIT) 76363 UNIT/ML SOLN injection Inject 1 mL into the skin every 14 days Every other Tuesday.  Hold if HGB >11 or BP > 180/100      ferrous sulfate 325 (65 Fe) MG tablet Take 1 tablet by mouth daily (with breakfast)      amLODIPine (NORVASC) 10 MG tablet Take 1 tablet by mouth daily      calcitRIOL (ROCALTROL) 0.5 MCG capsule Take one capsule by mouth on Monday, Wednesday, and Friday      sodium bicarbonate 650 MG tablet Take 2 tablets by mouth 3 times daily      atorvastatin (LIPITOR) 20 MG tablet Take 1 tablet by mouth daily      docusate sodium (COLACE) 100 MG capsule Take 2 capsules by mouth daily      folic acid (FOLVITE) 1 MG tablet Take 1 tablet by mouth daily      levothyroxine (SYNTHROID) 50 MCG tablet Take 1 tablet by mouth Daily      acetaminophen (TYLENOL) 500 MG tablet Take 2 tablets by mouth every 6 hours as needed (for right knee pain. Do not exceed 4 grams in 24 hours)         Current Meds  potassium chloride 20 mEq/50 mL IVPB (Central Line), PRN  magnesium sulfate 1000 mg in dextrose 5% 100 mL IVPB, PRN  calcium gluconate 1000 mg in sodium chloride 100 mL, PRN   Or  calcium gluconate 2,000 mg in sodium chloride 100 mL, PRN   Or  calcium gluconate 1000 mg in sodium chloride 100 mL, PRN   Or  calcium gluconate 2,000 mg in sodium chloride 100 mL, PRN  sodium phosphate 6 mmol in sodium chloride 0.9 % 250 mL IVPB, PRN   Or  sodium phosphate 12 mmol in sodium chloride 0.9 % 250 mL IVPB, PRN   Or  sodium phosphate 18 mmol in sodium chloride 0.9 %  Results   Component Value Date/Time    TRIG 851 06/23/2024 06:40 AM     UA:   Lab Results   Component Value Date/Time    COLORU Yellow 06/22/2024 03:23 AM    PHUR 6.0 06/22/2024 03:23 AM    WBCUA 0-2 06/22/2024 03:23 AM    RBCUA 11-20 06/22/2024 03:23 AM    MUCUS 2+ 06/22/2024 03:23 AM    BACTERIA 4+ 06/22/2024 03:23 AM    CLARITYU Clear 06/22/2024 03:23 AM    LEUKOCYTESUR Negative 06/22/2024 03:23 AM    UROBILINOGEN 0.2 06/22/2024 03:23 AM    BILIRUBINUR Negative 06/22/2024 03:23 AM    BLOODU LARGE 06/22/2024 03:23 AM    GLUCOSEU Negative 06/22/2024 03:23 AM       Imaging:   XR CHEST PORTABLE   Final Result   1. As above.      Electronically signed by Ravinder Rivera MD      CT HEAD WO CONTRAST   Final Result   1. Limited exam secondary to motion artifact.   2. When allowing for motion artifact, there is no CT evidence of any acute ischemia or hemorrhage.      Electronically signed by Ravinder Rivera MD      XR ABDOMEN (KUB) (SINGLE AP VIEW)   Final Result      As above.      Electronically signed by Ravinder Rivera MD      XR CHEST PORTABLE   Final Result   Low positioning of the ET tube.   Cardiomegaly. Central vascular congestion.      Electronically signed by Arielle Menon      CT TIBIA FIBULA RIGHT WO CONTRAST   Final Result   1.   Limited exam patient motion.   2.  Diffuse soft tissue swelling.      Electronically signed by Arielle Menon      CT FEMUR RIGHT WO CONTRAST   Final Result   1.   Limited exam patient motion.   2.  Diffuse soft tissue swelling.      Electronically signed by Arielle Menon      XR CHEST PORTABLE   Final Result   Bilateral infrahilar atelectasis or pneumonia.      Electronically signed by Zenon High             Assessment/Plan:  This is a 64 y.o. female with Hx of Hx of CKD, clotting disorder, HLD, HTN, thyroid disease, vascular dementia, tubal ligation, HAILY/BSO, and hysterectomy. Admitted to ICU on 6/21 after undergoing cardiac arrest. Requiring pressor

## 2024-06-24 NOTE — PROGRESS NOTES
Inpatient consult to Plastic Surgery  Consult performed by: Doenll Gamez DO  Consult ordered by: Emmett Shane DO        Completed.

## 2024-06-24 NOTE — PROGRESS NOTES
Consulted for R Lower Extremity: Pt has a large fluid-filled blister on medial aspect of her right thigh and right lower leg. Also right lower leg multiple areas of ruptured and de-roofed blisters, open areas. Loose skin removed with scissors. Open areas covered with Xeroform. Concern regarding R Toes 1-5, cold and dark color. Vascular Surgery consulted for evaluation. No photos taken at this time. Wound Care orders placed. Wound Care to continue to follow while inpatient.

## 2024-06-24 NOTE — CONSULTS
General Surgery   Resident Consult Note    Reason for Consult: Concern for SJS/TENS    History of Present Illness:   Marcela Colby is a 64 y.o. female with history of CKD, DVTs, HLD, HTN, thyroid disease, vascular dementia.  Presented initially on 6/21 for RLE pain and shortness of breath.  In the ED, pt arrested and was subsequently coded and intubated.  CT scan of the RLE at the time showed chronic lymphedema; however, no signs of necrotizing fasciitis.  During her stay here, pt was started on CRRT, and her RLE began to have skin changes.  Bullae began to erupt w/ further skin changes as can be seen in the chart below.  Vascular surgery was consulted to evaluate vascular supply to RLE; plastic surgery consulted due to concern for SJS/TENS vs SSS vs other bullous disease.  History limited by patient's mentation.    Past Medical History:        Diagnosis Date    Anemia     Anxiety     Atelectasis 3/29/2017    CKD (chronic kidney disease)     Clotting disorder (HCC)     Constipation     Depression     Hyperlipidemia     Hypertension     Muscle weakness     Pancytopenia (HCC)     Paranoid schizophrenia (HCC)     Personal history of venous thrombosis and embolism     Thrombocytopenia (HCC)     Thyroid disease     hypothyroidism    Vascular dementia (HCC)        Past Surgical History:        Procedure Laterality Date    DILATION AND CURETTAGE OF UTERUS  03/01/2017    hysterscope and myosure ablation    HYSTERECTOMY (CERVIX STATUS UNKNOWN)      HAILY AND BSO (CERVIX REMOVED)  04/17/2017    Computer-assisted tele-manipulated total hysterectomy, bilateral salpingo-oophorectomy with suture    TUBAL LIGATION  1984       Allergies:  Patient has no known allergies.    Medications:   Home Meds  No current facility-administered medications on file prior to encounter.     Current Outpatient Medications on File Prior to Encounter   Medication Sig Dispense Refill    acetaminophen (TYLENOL) 325 MG tablet Take 2 tablets by mouth every  8 hours as needed for Pain (do not exceed 4 grams in 24 hours)      labetalol (NORMODYNE) 300 MG tablet Take 1 tablet by mouth 2 times daily      methocarbamol (ROBAXIN) 500 MG tablet Take 1 tablet by mouth 3 times daily      Dextromethorphan-guaiFENesin  MG/5ML SYRP Take 10 mLs by mouth every 6 hours as needed for Cough      torsemide (DEMADEX) 20 MG tablet Take 2 tablets by mouth daily      Patiromer Sorbitex Calcium (VELTASSA) 16.8 g PACK Take 1 packet by mouth daily      Epoetin Jaron-epbx (RETACRIT) 77476 UNIT/ML SOLN injection Inject 1 mL into the skin every 14 days Every other Tuesday.  Hold if HGB >11 or BP > 180/100      ferrous sulfate 325 (65 Fe) MG tablet Take 1 tablet by mouth daily (with breakfast)      amLODIPine (NORVASC) 10 MG tablet Take 1 tablet by mouth daily      calcitRIOL (ROCALTROL) 0.5 MCG capsule Take one capsule by mouth on Monday, Wednesday, and Friday      sodium bicarbonate 650 MG tablet Take 2 tablets by mouth 3 times daily      atorvastatin (LIPITOR) 20 MG tablet Take 1 tablet by mouth daily      docusate sodium (COLACE) 100 MG capsule Take 2 capsules by mouth daily      folic acid (FOLVITE) 1 MG tablet Take 1 tablet by mouth daily      levothyroxine (SYNTHROID) 50 MCG tablet Take 1 tablet by mouth Daily      acetaminophen (TYLENOL) 500 MG tablet Take 2 tablets by mouth every 6 hours as needed (for right knee pain. Do not exceed 4 grams in 24 hours)         Current Meds  potassium chloride 20 mEq/50 mL IVPB (Central Line), PRN  magnesium sulfate 1000 mg in dextrose 5% 100 mL IVPB, PRN  calcium gluconate 1000 mg in sodium chloride 100 mL, PRN   Or  calcium gluconate 2,000 mg in sodium chloride 100 mL, PRN   Or  calcium gluconate 1000 mg in sodium chloride 100 mL, PRN   Or  calcium gluconate 2,000 mg in sodium chloride 100 mL, PRN  sodium phosphate 6 mmol in sodium chloride 0.9 % 250 mL IVPB, PRN   Or  sodium phosphate 12 mmol in sodium chloride 0.9 % 250 mL IVPB, PRN   Or  sodium  06:40 AM     PT/INR:   Recent Labs     06/22/24  1239 06/23/24  0043   PROTIME 29.4* 26.0*   INR 2.80* 2.38*         Imaging:   XR CHEST PORTABLE   Final Result   1. As above.      Electronically signed by Ravinder Rivera MD      CT HEAD WO CONTRAST   Final Result   1. Limited exam secondary to motion artifact.   2. When allowing for motion artifact, there is no CT evidence of any acute ischemia or hemorrhage.      Electronically signed by Ravinder Rivera MD      XR ABDOMEN (KUB) (SINGLE AP VIEW)   Final Result      As above.      Electronically signed by Ravinder Rivera MD      XR CHEST PORTABLE   Final Result   Low positioning of the ET tube.   Cardiomegaly. Central vascular congestion.      Electronically signed by Arielle Menon      CT TIBIA FIBULA RIGHT WO CONTRAST   Final Result   1.   Limited exam patient motion.   2.  Diffuse soft tissue swelling.      Electronically signed by Arielle Menon      CT FEMUR RIGHT WO CONTRAST   Final Result   1.   Limited exam patient motion.   2.  Diffuse soft tissue swelling.      Electronically signed by Arielle Menon      XR CHEST PORTABLE   Final Result   Bilateral infrahilar atelectasis or pneumonia.      Electronically signed by Zenon High            Assessment/Plan:  Marcela Colby is a 64 y.o. female with history of CKD, DVTs, HLD, HTN, thyroid disease, vascular dementia.  Contacted by vascular team due to concern for developing SJS/TENS.  Lesions are necrolytic, sloughing, and extensive across the right lower extremity.  No appreciated mucosal involvement currently.    - Recommend consultation with  [facility] team for further evaluation of epidermal necrolysis spectrum disease.  - Would also recommend ID consult for further eval of offending agents if deemed necessary by primary team  - Plastic surgery available if biopsy necessary  - DVT ruled out with venous Duplex  - Continue xeroform application to open wounds  - Patient, plan discussed with

## 2024-06-24 NOTE — PLAN OF CARE
Problem: Safety - Adult  Goal: Free from fall injury  6/24/2024 0815 by Cheryl Rose RN  Outcome: Progressing  6/24/2024 0815 by Cheryl Rose RN  Outcome: Progressing  Flowsheets (Taken 6/24/2024 0814)  Free From Fall Injury: Instruct family/caregiver on patient safety  6/23/2024 2145 by Melina Ricketts RN  Outcome: Progressing  Flowsheets (Taken 6/23/2024 2140)  Free From Fall Injury: Instruct family/caregiver on patient safety  6/23/2024 1955 by Brien Simons RN  Outcome: Progressing  Note: Fall risk protocol in place: Bed alarm on, fall risk bracelet applied, yellow indicator in hallway on, non-skid footwear in use.  Patient/family educated on fall risk protocol, instructed to call for assistance when needed.     Problem: Discharge Planning  Goal: Discharge to home or other facility with appropriate resources  6/24/2024 0815 by Cheryl Rose RN  Outcome: Progressing  6/24/2024 0815 by Cheryl Rose RN  Outcome: Progressing  6/23/2024 2145 by Melina Ricketts RN  Outcome: Not Progressing  Flowsheets (Taken 6/23/2024 2000)  Discharge to home or other facility with appropriate resources: Identify barriers to discharge with patient and caregiver     Problem: Pain  Goal: Verbalizes/displays adequate comfort level or baseline comfort level  6/24/2024 0815 by Cheryl Rose RN  Outcome: Progressing  6/24/2024 0815 by Cheryl Rose RN  Outcome: Progressing  6/23/2024 2145 by Melina Ricketts RN  Outcome: Progressing  Flowsheets (Taken 6/23/2024 2000)  Verbalizes/displays adequate comfort level or baseline comfort level: Encourage patient to monitor pain and request assistance     Problem: Safety - Medical Restraint  Goal: Remains free of injury from restraints (Restraint for Interference with Medical Device)  Description: INTERVENTIONS:  1. Determine that other, less restrictive measures have been tried or would not be effective before applying the restraint  2. Evaluate the  other, less restrictive measures have been tried or would not be effective before applying the restraint  Taken 6/23/2024 2200 by Melina Ricketts RN  Remains free of injury from restraints (restraint for interference with medical device): Determine that other, less restrictive measures have been tried or would not be effective before applying the restraint  6/23/2024 2145 by Melina Ricketts RN  Outcome: Not Progressing  Flowsheets (Taken 6/23/2024 2000)  Remains free of injury from restraints (restraint for interference with medical device): Determine that other, less restrictive measures have been tried or would not be effective before applying the restraint  6/23/2024 1955 by Brien Simons RN  Outcome: Progressing  Flowsheets  Taken 6/23/2024 1800  Remains free of injury from restraints (restraint for interference with medical device):   Determine that other, less restrictive measures have been tried or would not be effective before applying the restraint   Evaluate the patient's condition at the time of restraint application   Inform patient/family regarding the reason for restraint   Every 2 hours: Monitor safety, psychosocial status, comfort, nutrition and hydration  Taken 6/23/2024 1600  Remains free of injury from restraints (restraint for interference with medical device):   Determine that other, less restrictive measures have been tried or would not be effective before applying the restraint   Evaluate the patient's condition at the time of restraint application   Inform patient/family regarding the reason for restraint   Every 2 hours: Monitor safety, psychosocial status, comfort, nutrition and hydration  Taken 6/23/2024 1400  Remains free of injury from restraints (restraint for interference with medical device):   Determine that other, less restrictive measures have been tried or would not be effective before applying the restraint   Evaluate the patient's condition at the time of restraint  site  4.  Every 4-6 hours:  If on nasal continuous positive airway pressure, respiratory therapy assess nares and determine need for appliance change or resting period.  6/24/2024 0815 by Cheryl Rose, RN  Outcome: Progressing  6/24/2024 0815 by Cheryl Rose, RN  Outcome: Progressing  6/23/2024 2145 by Melina Ricketts, RN  Outcome: Not Progressing  6/23/2024 1955 by Brien Simons RN  Outcome: Progressing  Note: Patient unable to turn self adequately in bed, so pillow supports in use with repositioning every two hours. Sacral Heart Mepilex in place to protect, skin intact underneath. Padded areas where equipment touches skin.

## 2024-06-24 NOTE — CONSULTS
[facility]  Palliative Medicine Consultation Note      Date Of Admission:6/21/2024  Date of consult: 06/24/24  Seen by PC in the past:  No    Recommendations:        Saw pt at the bedside. She was intubated, sedated. D/w RUPERT Rivera and Dr. Shane.    [facility] is Liza Ngo. Called Liza, she has a good understanding of pt's condition. She reported that she is working with pt's two daughters Noa and Emma for decision making. She is aware that pt has told her nephrologist previously she would not want dialysis, however the daughters were unaware of this and feel that pt did not understand that decision. Liza would be willing to participate in a family meeting with hospital staff and pt's family in the coming days when pt's prognosis is more clear. Liza suggested I call pt's daughter Noa for further information.     Called pt's daughter Noa, introduced palliative care. Discussed her understanding of pt's condition. She was unaware that pt had a guardian until recently. I asked about pt's baseline, how long she had been in the nursing home etc. Noa reported that pt was court-ordered to go to a nursing home due to her inability to walk/care for self at home. Noa could not recall when that happened, but likely years ago. Noa reported that pt's mobility had improved to some degree at the nursing facility. She reported that she would be at the hospital later this afternoon after she gets off of work.     1. Goals of Care/Advanced Care planning/Code status: Full code, pt has a guardian Liza Ngo who is working with pt's two daughters on decision making. Will offer family meeting in the coming days when pt's prognosis/medical needs become more clear and family has had time to process her change in status as well as news of guardianship.   2. Pain: pt unable to state, currently on fentanyl gtt, appears    BLOODU LARGE*   GLUCOSEU Negative       XR CHEST PORTABLE   Final Result   1. As above.      Electronically signed by Ravinder Rivera MD      CT HEAD WO CONTRAST   Final Result   1. Limited exam secondary to motion artifact.   2. When allowing for motion artifact, there is no CT evidence of any acute ischemia or hemorrhage.      Electronically signed by Ravinder Rivera MD      XR ABDOMEN (KUB) (SINGLE AP VIEW)   Final Result      As above.      Electronically signed by Ravinder Rivera MD      XR CHEST PORTABLE   Final Result   Low positioning of the ET tube.   Cardiomegaly. Central vascular congestion.      Electronically signed by Arielle Menon      CT TIBIA FIBULA RIGHT WO CONTRAST   Final Result   1.   Limited exam patient motion.   2.  Diffuse soft tissue swelling.      Electronically signed by Arielle Meonn      CT FEMUR RIGHT WO CONTRAST   Final Result   1.   Limited exam patient motion.   2.  Diffuse soft tissue swelling.      Electronically signed by Arielle Menon      XR CHEST PORTABLE   Final Result   Bilateral infrahilar atelectasis or pneumonia.      Electronically signed by Zenon High            Conclusion/Time spent:         Recommendations see above    Time spent with patient and/or family: 30  Time reviewing records: 10 min   Time communicating with staff: 10 min     A total of 50 minutes spent with the patient and family on unit greater than 50% in counseling regarding palliative care and in goals of care for the patient.    Thank you to Dr. Argueta for this consultation. We will continue to follow Ms. Colby's care as needed.      Erin DEL ROSARIO  Inpatient Palliative Care  [phone]

## 2024-06-24 NOTE — PROGRESS NOTES
[facility] Progress Note    CC: LUIS on CKD 4    64 y.o. year old female who we are seeing in consultation for acute kidney injury.  Patient has a history of chronic kidney disease stage IV.  Had PEA arrest on presentation.         Subjective/     Ongoing CRRT, tolerating  Attempting 50 ml/hr net off but still on 2 pressors and limited UF  Remains intubated, not interactive  Has necrotic changes to RLE    ROS: unable    No visitors    Objective/   GEN:  Chronically ill, /61   Pulse 85   Temp 97.9 °F (36.6 °C) (Bladder)   Resp 14   Ht 1.6 m (5' 3\")   Wt (!) 143.4 kg (316 lb 2.2 oz)   LMP  (LMP Unknown) Comment: unsure  SpO2 98%   BMI 56.00 kg/m²     GEN: morbidly obese, on vent, critically ill  HEENT: non-icteric, ETT in place  NECK: no JVD  CV: S1, S2 without m/r/g  RESP: diminished BS bibasilar  ABD: Soft, NT  SKIN: warm,   EXT: peripheral and dependent edema LLE and back; RLE with ischemic/necrotic type skin changes an swelling  NEURO: sedated    Data/  CBC:   Recent Labs     06/22/24  0617 06/23/24  0640 06/24/24  0324   WBC 2.4* 10.8 7.0   RBC 2.95* 2.88* 2.67*   HGB 9.1* 9.3* 8.6*   HCT 27.9* 27.3* 25.1*    100* 73*     BMP:    Recent Labs     06/23/24  0640 06/23/24  1854 06/24/24  0324    133* 134*   K 5.5* 5.2* 4.7    101 102   CO2 22 21 22   BUN 56* 28* 19   CREATININE 4.0* 2.1* 1.6*   CALCIUM 7.4* 7.6* 7.9*   GLUCOSE 114* 98 90     Phosphorus:    Recent Labs     06/23/24  0039 06/23/24  1854 06/24/24  0324   PHOS 6.0* 2.7 2.0*     Magnesium:    Recent Labs     06/23/24  0640 06/23/24  1854 06/24/24  0324   MG 2.50* 2.30 2.20     Hepatic Function Panel:    Lab Results   Component Value Date/Time    ALKPHOS 79 06/24/2024 03:24 AM     06/24/2024 03:24 AM     06/24/2024 03:24 AM    PROT 7.9 06/15/2017 12:12 PM    BILITOT 1.1 06/24/2024 03:24 AM    BILIDIR 0.4 06/22/2024 12:38 PM    IBILI 0.2 06/22/2024 12:38 PM             Assessment/      Acute kidney injury stage III likely ischemic ATN in the setting of prolonged PEA arrest  Oliguric  Patient had previously expressed she would not want dialysis  Discussions with Dr Gutierrez and legal guardian: decided to proceed with CRRT  Stable CRRT to this point  - continue CRRT acutely  - will have prn electrolyte replacement  - attempt volume off as possible    Chronic kidney disease stage V secondary to hypertensive nephrosclerosis creatinine baseline around 4.7 mg/dL.  Following with Dr. Box  -Per previous documentation patient is not interested in long term renal replacement therapy.    S/p PEA arrest   requiring several rounds of CPR    Acute hypoxic respiratory failure  On vent    Shock  Sepsis and post cardiac arrest  On 2 pressors now  RLE showing changes of ischemia/necrosis  - may need surgery input for viability of RLE    Shock liver  Transaminases improved  Electrolytes  Acidosis improved  - will use prn electrolyte supplement on CRRT    Anemia  - use LISA    Prognosis limited if not poor  - Palliative Care involved  - full support at present        ____________________________________  Ron Cantu MD  [facility]  [email or web address]  Office: [phone]     Cheek Interpolation Flap Text: A decision was made to reconstruct the defect utilizing an interpolation axial flap and a staged reconstruction.  A telfa template was made of the defect.  This telfa template was then used to outline the Cheek Interpolation flap.  The donor area for the pedicle flap was then injected with anesthesia.  The flap was excised through the skin and subcutaneous tissue down to the layer of the underlying musculature.  The interpolation flap was carefully excised within this deep plane to maintain its blood supply.  The edges of the donor site were undermined.   The donor site was closed in a primary fashion.  The pedicle was then rotated into position and sutured.  Once the tube was sutured into place, adequate blood supply was confirmed with blanching and refill.  The pedicle was then wrapped with xeroform gauze and dressed appropriately with a telfa and gauze bandage to ensure continued blood supply and protect the attached pedicle.

## 2024-06-24 NOTE — PLAN OF CARE
Problem: Safety - Adult  Goal: Free from fall injury  6/23/2024 2145 by Melina Ricketts, RN  Outcome: Progressing  Flowsheets (Taken 6/23/2024 2140)  Free From Fall Injury: Instruct family/caregiver on patient safety     Problem: Discharge Planning  Goal: Discharge to home or other facility with appropriate resources  Outcome: Not Progressing  Flowsheets (Taken 6/23/2024 2000)  Discharge to home or other facility with appropriate resources: Identify barriers to discharge with patient and caregiver     Problem: Pain  Goal: Verbalizes/displays adequate comfort level or baseline comfort level  Outcome: Progressing  Flowsheets (Taken 6/23/2024 2000)  Verbalizes/displays adequate comfort level or baseline comfort level: Encourage patient to monitor pain and request assistance     Problem: Safety - Medical Restraint  Goal: Remains free of injury from restraints (Restraint for Interference with Medical Device)  Description: INTERVENTIONS:  1. Determine that other, less restrictive measures have been tried or would not be effective before applying the restraint  2. Evaluate the patient's condition at the time of restraint application  3. Inform patient/family regarding the reason for restraint  4. Q2H: Monitor safety, psychosocial status, comfort, nutrition and hydration  6/23/2024 2145 by Melina Ricketts RN  Outcome: Not Progressing  Flowsheets (Taken 6/23/2024 2000)  Remains free of injury from restraints (restraint for interference with medical device): Determine that other, less restrictive measures have been tried or would not be effective before applying the restraint     Problem: Skin/Tissue Integrity  Goal: Absence of new skin breakdown  Description: 1.  Monitor for areas of redness and/or skin breakdown  2.  Assess vascular access sites hourly  3.  Every 4-6 hours minimum:  Change oxygen saturation probe site  4.  Every 4-6 hours:  If on nasal continuous positive airway pressure, respiratory therapy assess

## 2024-06-24 NOTE — PROGRESS NOTES
Patient has had multiple family members visit today, each concerned and asking for updates. Support offered and all family encouraged to visit with patient and call with any needs. Legal guardian paperwork confirmed and in paper chart.    Visitors are all eager for updates on patient condition, to which they've been encouraged to connect with the patient's daughters, who were present in family meeting 6/24 and are working closely with the patient's legal guardian Liza Ngo, in decision making.    Ethics following in support. See their note.

## 2024-06-24 NOTE — PROGRESS NOTES
Sedation and pain medication increased d/t RASS +1, and persistent coughing, gagging, and grimacing after wound care.

## 2024-06-24 NOTE — PLAN OF CARE
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  Was advised to consult [facility] team at  for further recommendation.  Transfer center was called and the writer was in contact with on-call physician in [facility].  Discussed the case with the consulting physician, and on-review of history and images, consulting physician felt that this was inconsistent with Elmore-Endy syndrome given involvement of only one area of the body. They believe that the injury would instead likely be due to a localized process in the affected area. They did not feel that a [facility] consult was therefore necessary at this time, and deferred further decision making to the plastic surgery/general surgery team here at [facility].  Relayed this discussion to general surgery team who reported that they would be biopsying the affected area tomorrow and will provide supportive care at this time.

## 2024-06-24 NOTE — PROGRESS NOTES
[facility] -  Clinical Pharmacy Note    Vancomycin - Management by Pharmacy    Consult Date(s): 6/21/24  Consulting Provider(s):  Dr. Hammer    Assessment / Plan  PNA, potential LE cellulitis - Vancomycin  Concurrent Antimicrobials: Zosyn 4.5 g IV q8h EI (Day #4 of 7)  Day of Vanc Therapy / Ordered Duration: Day #4 of 7  Current Dosing Method: Intermittent Dosing by Levels  Therapeutic Goal: Trough ~ 15 mg/L  Current Dose / Plan:   Pt has known ESRD, is now on CRRT. Nephrology following  Vancomycin currently being dosed intermittently based upon levels obtained given CRRT status  Will plan to continue to dose vancomycin intermittently via levels  Random level obtained this AM of 13.1 mg/L  Will plan to re-dose vancomycin today with 1500 mg IV x1 today (ordered)  Will plan to obtain a random level again tomorrow AM (6/25 AM, ordered) prior to re-dosing  MRSA nares negative -> Please consider discontinuation of vancomycin at this time  Will continue to monitor clinical condition and make adjustments to regimen as appropriate    Thank you for consulting pharmacy.      Chani Padilla, PharmD, [facility]  Clinical Pharmacy Specialist - Emergency Dept  Wireless: [phone]  6/24/2024 6:29 AM      Interval update:  Patient remains intubated, sedated, on vasopressors at this time. Currently on CRRT. No leukocytosis noted on today's CBC. MRSA nares negative. Please consider discontinuation of vancomycin at this time.     Subjective/Objective: Marcela Colby is a 64 y.o. female with a PMHx significant for HLD, HTN, venous thrombosis and embolism, chronic lymphedema, hypothyroidism, Vascular dementia, CKD stage-V.  Presented from NH with SOB and lower leg pain/swelling.  PEA cardiac arrest in ED and achieved ROSC after several rounds of CPR. Admitted 6/21 to ICU with aspiration pneumonia vs LE cellulitis, septic shock, possible PE.     Pharmacy is consulted to dose vancomycin.    Ht Readings from Last 1 Encounters:   06/22/24

## 2024-06-24 NOTE — CONSULTS
Clinical Ethics Consultation Note    History and Context:  Principal Problem:    Cardiac arrest (HCC)  Active Problems:    Cellulitis of right lower extremity    Acute metabolic encephalopathy    Septic shock (HCC)    Acute renal failure superimposed on stage 5 chronic kidney disease, not on chronic dialysis (HCC)    Encounter for palliative care  Resolved Problems:    * No resolved hospital problems. *    Age: 64 y.o.  Gender: female  Gender Identity: female  Admitted Date: 6/21/2024  Consult Date: 06/24/24  MRN: [id number]  Valid Advanced Medical Directive:   No  Process:  Chart Review; Conversation with Nicole, ICU RN and Emma, patient's daughter    Ethical Question(s) and Concern(s):  Patent was assigned a legal guardian, Liza Ngo, January, 2024.Patient's two adult children (Emma and Erwin) report they were not aware the patient has a legal guardian until patient's admission to [facility] ICU 6/21/2024. At Emma's (daughter) request, we talked by phone at at 3:30 pm. Emma described involvement of Legal Guardian as a \"Huge Situation\"; stated she has contacted (but has not yet talked with) a  for \"assistance with this situation\".  Emma was provided contact information for [place] [address] to explore how / why a legal guardian was appointed.Note: Emma has talked with legal guardian; per chart, legal guardian confirms contact with adult children since hospitalization.  Daughter is concerned that patient' health care plan should be decided by adult children rather than a legal guardian.Per chart, daughter states she believes Mother did not understand (due to memory and mental health concerns) what she was saying when stating she does not want dialysis.    Analysis:  Adult children are processing news that mother has a legal guardian without their awareness. They are seeking understanding for guardianship implementation, and options they have regarding Mother's plan of

## 2024-06-24 NOTE — PROGRESS NOTES
Patient had a very stubborn fever today, max 102.4F.  Unresponsive to tylenol, packed with ice and changed out when melted.  Eventually got down to 100.8F.  MDs aware throughout the day.

## 2024-06-24 NOTE — PROGRESS NOTES
Patient has extensive blistering and weeping to RLE from groin to heel. Most blisters are open with a large amount of skin sloughing and exposed tissue noted. Defined darkened edges noted just below knee. Wound care and surgery both bedside today with UC consulted - see MD note.     Xeroform guaze in placed to all open areas with bed pads exchanged for drainage management.     Patient's toes are cooler with slight discoloration, R greater than L. DP and pedal pulses noted per doppler.

## 2024-06-24 NOTE — CARE COORDINATION
Case Management Assessment  Initial Evaluation    Date/Time of Evaluation: 6/24/2024 11:53 AM  Assessment Completed by: Debby Isabel RN    If patient is discharged prior to next notation, then this note serves as note for discharge by case management.    Patient Name: Marcela Colby                   YOB: 1960  Diagnosis: Cardiac arrest (HCC) [I46.9]  Septicemia (HCC) [A41.9]  Cellulitis of right lower extremity [L03.115]  Acute renal failure, unspecified acute renal failure type (HCC) [N17.9]                   Date / Time: 6/21/2024  4:12 PM    Patient Admission Status: Inpatient   Readmission Risk (Low < 19, Mod (19-27), High > 27): Readmission Risk Score: 17.8    Current PCP: Thalia Au MD  PCP verified by CM? Yes    Chart Reviewed: Yes      History Provided by: Other (see comment) (staff at [facility])  Patient Orientation: Sedated, Unable to Assess    Patient Cognition: Other (see comment) (intubated)    Hospitalization in the last 30 days (Readmission):  No    If yes, Readmission Assessment in  Navigator will be completed.    Advance Directives:      Code Status: Full Code   Patient's Primary Decision Maker is: Legal Next of Kin    Primary Decision Maker: Liza Quiles - Legal Guardian, Legal Guardian - [phone]    Discharge Planning:    Patient lives with: Alone Type of Home: Long-Term Care ([facility])  Primary Care Giver: Self  Patient Support Systems include: Family Members, / (guardian)   Current Financial resources: Medicare, Medicaid  Current community resources: ECF/Home Care  Current services prior to admission: Extended Care Placement            Current DME:              Type of Home Care services:  None    ADLS  Prior functional level: Assistance with the following:, Cooking, Housework, Shopping, Mobility, Bathing, Dressing, Toileting  Current functional level: Cooking, Feeding, Toileting, Dressing, Bathing, Assistance with  Patient Representative Name:       The Patient and/or Patient Representative Agree with the Discharge Plan?      Debby Isabel RN  Case Management Department  [phone]

## 2024-06-25 LAB
ALBUMIN SERPL-MCNC: 2.2 G/DL (ref 3.4–5)
ALBUMIN SERPL-MCNC: 2.5 G/DL (ref 3.4–5)
ALBUMIN/GLOB SERPL: 0.7 {RATIO} (ref 1.1–2.2)
ALBUMIN/GLOB SERPL: 0.8 {RATIO} (ref 1.1–2.2)
ALP SERPL-CCNC: 107 U/L (ref 40–129)
ALP SERPL-CCNC: 114 U/L (ref 40–129)
ALT SERPL-CCNC: 417 U/L (ref 10–40)
ALT SERPL-CCNC: 533 U/L (ref 10–40)
ANION GAP SERPL CALCULATED.3IONS-SCNC: 12 MMOL/L (ref 3–16)
ANION GAP SERPL CALCULATED.3IONS-SCNC: 13 MMOL/L (ref 3–16)
AST SERPL-CCNC: 143 U/L (ref 15–37)
AST SERPL-CCNC: 95 U/L (ref 15–37)
BASE EXCESS BLDA CALC-SCNC: -4 MMOL/L (ref -3–3)
BASE EXCESS BLDA CALC-SCNC: -4.3 MMOL/L (ref -3–3)
BASE EXCESS BLDA CALC-SCNC: -5 MMOL/L (ref -3–3)
BASOPHILS # BLD: 0 K/UL (ref 0–0.2)
BASOPHILS NFR BLD: 0 %
BILIRUB SERPL-MCNC: 1 MG/DL (ref 0–1)
BILIRUB SERPL-MCNC: 1 MG/DL (ref 0–1)
BUN SERPL-MCNC: 10 MG/DL (ref 7–20)
BUN SERPL-MCNC: 9 MG/DL (ref 7–20)
CA-I BLD-SCNC: 1.1 MMOL/L (ref 1.12–1.32)
CA-I BLD-SCNC: 1.11 MMOL/L (ref 1.12–1.32)
CA-I BLD-SCNC: 1.11 MMOL/L (ref 1.12–1.32)
CA-I BLD-SCNC: 1.14 MMOL/L (ref 1.12–1.32)
CA-I BLD-SCNC: 1.21 MMOL/L (ref 1.12–1.32)
CA-I BLD-SCNC: 1.21 MMOL/L (ref 1.12–1.32)
CALCIUM SERPL-MCNC: 8.2 MG/DL (ref 8.3–10.6)
CALCIUM SERPL-MCNC: 8.6 MG/DL (ref 8.3–10.6)
CHLORIDE SERPL-SCNC: 102 MMOL/L (ref 99–110)
CHLORIDE SERPL-SCNC: 103 MMOL/L (ref 99–110)
CO2 BLDA-SCNC: 22 MMOL/L
CO2 BLDA-SCNC: 24 MMOL/L
CO2 BLDA-SCNC: 25 MMOL/L
CO2 SERPL-SCNC: 20 MMOL/L (ref 21–32)
CO2 SERPL-SCNC: 21 MMOL/L (ref 21–32)
COHGB MFR BLDA: 0.9 % (ref 0–1.5)
CREAT SERPL-MCNC: 1.2 MG/DL (ref 0.6–1.2)
CREAT SERPL-MCNC: 1.2 MG/DL (ref 0.6–1.2)
DEPRECATED RDW RBC AUTO: 15.5 % (ref 12.4–15.4)
EOSINOPHIL # BLD: 0.3 K/UL (ref 0–0.6)
EOSINOPHIL NFR BLD: 2.6 %
GFR SERPLBLD CREATININE-BSD FMLA CKD-EPI: 50 ML/MIN/{1.73_M2}
GFR SERPLBLD CREATININE-BSD FMLA CKD-EPI: 50 ML/MIN/{1.73_M2}
GLUCOSE BLD-MCNC: 82 MG/DL (ref 70–99)
GLUCOSE BLD-MCNC: 90 MG/DL (ref 70–99)
GLUCOSE BLD-MCNC: 96 MG/DL (ref 70–99)
GLUCOSE SERPL-MCNC: 84 MG/DL (ref 70–99)
GLUCOSE SERPL-MCNC: 89 MG/DL (ref 70–99)
HCO3 BLDA-SCNC: 20.8 MMOL/L (ref 21–29)
HCO3 BLDA-SCNC: 22.4 MMOL/L (ref 21–29)
HCO3 BLDA-SCNC: 23 MMOL/L (ref 21–29)
HCT VFR BLD AUTO: 24.8 % (ref 36–48)
HGB BLD-MCNC: 8.3 G/DL (ref 12–16)
HGB BLDA-MCNC: 15 G/DL
LACTATE BLD-SCNC: 1.56 MMOL/L (ref 0.4–2)
LACTATE BLD-SCNC: 1.76 MMOL/L (ref 0.4–2)
LYMPHOCYTES # BLD: 0.2 K/UL (ref 1–5.1)
LYMPHOCYTES NFR BLD: 1.5 %
MAGNESIUM SERPL-MCNC: 2.2 MG/DL (ref 1.8–2.4)
MAGNESIUM SERPL-MCNC: 2.4 MG/DL (ref 1.8–2.4)
MCH RBC QN AUTO: 31.7 PG (ref 26–34)
MCHC RBC AUTO-ENTMCNC: 33.6 G/DL (ref 31–36)
MCV RBC AUTO: 94.4 FL (ref 80–100)
METHGB MFR BLDA: 0.4 % (ref 0–1.4)
MONOCYTES # BLD: 0.5 K/UL (ref 0–1.3)
MONOCYTES NFR BLD: 3.7 %
NEUTROPHILS # BLD: 12.3 K/UL (ref 1.7–7.7)
NEUTROPHILS NFR BLD: 92.2 %
PCO2 BLDA: 37.9 MM HG (ref 35–45)
PCO2 BLDA: 42.6 MM HG (ref 35–45)
PCO2 BLDA: 50.8 MMHG (ref 35–45)
PERFORMED ON: ABNORMAL
PERFORMED ON: ABNORMAL
PERFORMED ON: NORMAL
PH BLDA: 7.27 [PH] (ref 7.35–7.45)
PH BLDA: 7.33 [PH] (ref 7.35–7.45)
PH BLDA: 7.35 [PH] (ref 7.35–7.45)
PH VENOUS: 7.29 (ref 7.35–7.45)
PH VENOUS: 7.33 (ref 7.35–7.45)
PH VENOUS: 7.33 (ref 7.35–7.45)
PH VENOUS: 7.37 (ref 7.35–7.45)
PHOSPHATE SERPL-MCNC: 2 MG/DL (ref 2.5–4.9)
PLATELET # BLD AUTO: 64 K/UL (ref 135–450)
PMV BLD AUTO: 9.2 FL (ref 5–10.5)
PO2 BLDA: 106.4 MM HG (ref 75–108)
PO2 BLDA: 119 MMHG (ref 75–108)
PO2 BLDA: 85.3 MM HG (ref 75–108)
POC SAMPLE TYPE: ABNORMAL
POC SAMPLE TYPE: ABNORMAL
POTASSIUM BLD-SCNC: 3.9 MMOL/L (ref 3.5–5.1)
POTASSIUM BLD-SCNC: 3.9 MMOL/L (ref 3.5–5.1)
POTASSIUM SERPL-SCNC: 4 MMOL/L (ref 3.5–5.1)
POTASSIUM SERPL-SCNC: 4 MMOL/L (ref 3.5–5.1)
PROT SERPL-MCNC: 5.3 G/DL (ref 6.4–8.2)
PROT SERPL-MCNC: 5.6 G/DL (ref 6.4–8.2)
RBC # BLD AUTO: 2.63 M/UL (ref 4–5.2)
SAO2 % BLDA: 96 % (ref 93–100)
SAO2 % BLDA: 98 % (ref 93–100)
SAO2 % BLDA: 98 % (ref 93–100)
SODIUM BLD-SCNC: 138 MMOL/L (ref 136–145)
SODIUM BLD-SCNC: 139 MMOL/L (ref 136–145)
SODIUM SERPL-SCNC: 135 MMOL/L (ref 136–145)
SODIUM SERPL-SCNC: 136 MMOL/L (ref 136–145)
VANCOMYCIN SERPL-MCNC: 14.5 UG/ML
WBC # BLD AUTO: 13.4 K/UL (ref 4–11)

## 2024-06-25 PROCEDURE — 6360000002 HC RX W HCPCS

## 2024-06-25 PROCEDURE — 2000000000 HC ICU R&B

## 2024-06-25 PROCEDURE — 94761 N-INVAS EAR/PLS OXIMETRY MLT: CPT

## 2024-06-25 PROCEDURE — 84100 ASSAY OF PHOSPHORUS: CPT

## 2024-06-25 PROCEDURE — 94003 VENT MGMT INPAT SUBQ DAY: CPT

## 2024-06-25 PROCEDURE — 2580000003 HC RX 258

## 2024-06-25 PROCEDURE — 99233 SBSQ HOSP IP/OBS HIGH 50: CPT | Performed by: INTERNAL MEDICINE

## 2024-06-25 PROCEDURE — 6370000000 HC RX 637 (ALT 250 FOR IP)

## 2024-06-25 PROCEDURE — 80053 COMPREHEN METABOLIC PANEL: CPT

## 2024-06-25 PROCEDURE — 90945 DIALYSIS ONE EVALUATION: CPT

## 2024-06-25 PROCEDURE — 37799 UNLISTED PX VASCULAR SURGERY: CPT

## 2024-06-25 PROCEDURE — 2700000000 HC OXYGEN THERAPY PER DAY

## 2024-06-25 PROCEDURE — 2500000003 HC RX 250 WO HCPCS: Performed by: INTERNAL MEDICINE

## 2024-06-25 PROCEDURE — 83735 ASSAY OF MAGNESIUM: CPT

## 2024-06-25 PROCEDURE — 82803 BLOOD GASES ANY COMBINATION: CPT

## 2024-06-25 PROCEDURE — 99291 CRITICAL CARE FIRST HOUR: CPT | Performed by: INTERNAL MEDICINE

## 2024-06-25 PROCEDURE — 36569 INSJ PICC 5 YR+ W/O IMAGING: CPT

## 2024-06-25 PROCEDURE — 84295 ASSAY OF SERUM SODIUM: CPT

## 2024-06-25 PROCEDURE — 85025 COMPLETE CBC W/AUTO DIFF WBC: CPT

## 2024-06-25 PROCEDURE — 84132 ASSAY OF SERUM POTASSIUM: CPT

## 2024-06-25 PROCEDURE — 2500000003 HC RX 250 WO HCPCS

## 2024-06-25 PROCEDURE — 02HV33Z INSERTION OF INFUSION DEVICE INTO SUPERIOR VENA CAVA, PERCUTANEOUS APPROACH: ICD-10-PCS

## 2024-06-25 PROCEDURE — 2580000003 HC RX 258: Performed by: INTERNAL MEDICINE

## 2024-06-25 PROCEDURE — 93005 ELECTROCARDIOGRAM TRACING: CPT

## 2024-06-25 PROCEDURE — 0HBKXZX EXCISION OF RIGHT LOWER LEG SKIN, EXTERNAL APPROACH, DIAGNOSTIC: ICD-10-PCS

## 2024-06-25 PROCEDURE — 99232 SBSQ HOSP IP/OBS MODERATE 35: CPT | Performed by: SURGERY

## 2024-06-25 PROCEDURE — 83605 ASSAY OF LACTIC ACID: CPT

## 2024-06-25 PROCEDURE — C1751 CATH, INF, PER/CENT/MIDLINE: HCPCS

## 2024-06-25 PROCEDURE — 82947 ASSAY GLUCOSE BLOOD QUANT: CPT

## 2024-06-25 PROCEDURE — 6360000002 HC RX W HCPCS: Performed by: INTERNAL MEDICINE

## 2024-06-25 PROCEDURE — 82330 ASSAY OF CALCIUM: CPT

## 2024-06-25 PROCEDURE — 80202 ASSAY OF VANCOMYCIN: CPT

## 2024-06-25 RX ORDER — LIDOCAINE HYDROCHLORIDE 10 MG/ML
5 INJECTION, SOLUTION EPIDURAL; INFILTRATION; INTRACAUDAL; PERINEURAL ONCE
Status: COMPLETED | OUTPATIENT
Start: 2024-06-25 | End: 2024-06-25

## 2024-06-25 RX ORDER — LIDOCAINE HYDROCHLORIDE AND EPINEPHRINE 10; 10 MG/ML; UG/ML
20 INJECTION, SOLUTION INFILTRATION; PERINEURAL ONCE
Status: DISCONTINUED | OUTPATIENT
Start: 2024-06-25 | End: 2024-06-28 | Stop reason: ALTCHOICE

## 2024-06-25 RX ORDER — SODIUM CHLORIDE 9 MG/ML
INJECTION, SOLUTION INTRAVENOUS PRN
Status: DISCONTINUED | OUTPATIENT
Start: 2024-06-25 | End: 2024-06-30 | Stop reason: HOSPADM

## 2024-06-25 RX ORDER — SODIUM CHLORIDE 0.9 % (FLUSH) 0.9 %
5-40 SYRINGE (ML) INJECTION PRN
Status: DISCONTINUED | OUTPATIENT
Start: 2024-06-25 | End: 2024-06-30 | Stop reason: HOSPADM

## 2024-06-25 RX ORDER — SODIUM CHLORIDE 0.9 % (FLUSH) 0.9 %
5-40 SYRINGE (ML) INJECTION EVERY 12 HOURS SCHEDULED
Status: DISCONTINUED | OUTPATIENT
Start: 2024-06-25 | End: 2024-06-30 | Stop reason: HOSPADM

## 2024-06-25 RX ADMIN — Medication: at 15:44

## 2024-06-25 RX ADMIN — LEVOTHYROXINE SODIUM 50 MCG: 50 TABLET ORAL at 06:24

## 2024-06-25 RX ADMIN — Medication 150 MCG/HR: at 17:35

## 2024-06-25 RX ADMIN — Medication: at 00:11

## 2024-06-25 RX ADMIN — NOREPINEPHRINE BITARTRATE 12 MCG/MIN: 1 INJECTION, SOLUTION, CONCENTRATE INTRAVENOUS at 18:24

## 2024-06-25 RX ADMIN — Medication: at 15:45

## 2024-06-25 RX ADMIN — SODIUM CHLORIDE 1500 MG: 9 INJECTION, SOLUTION INTRAVENOUS at 09:42

## 2024-06-25 RX ADMIN — DEXMEDETOMIDINE 1 MCG/KG/HR: 200 INJECTION, SOLUTION INTRAVENOUS at 15:13

## 2024-06-25 RX ADMIN — Medication: at 08:18

## 2024-06-25 RX ADMIN — DEXMEDETOMIDINE 0.6 MCG/KG/HR: 200 INJECTION, SOLUTION INTRAVENOUS at 06:37

## 2024-06-25 RX ADMIN — CALCIUM GLUCONATE 1000 MG: 10 INJECTION, SOLUTION INTRAVENOUS at 06:19

## 2024-06-25 RX ADMIN — Medication: at 11:58

## 2024-06-25 RX ADMIN — POLYVINYL ALCOHOL 1 DROP: 1.4 SOLUTION/ DROPS OPHTHALMIC at 08:47

## 2024-06-25 RX ADMIN — PIPERACILLIN AND TAZOBACTAM 4500 MG: 4; .5 INJECTION, POWDER, LYOPHILIZED, FOR SOLUTION INTRAVENOUS at 15:51

## 2024-06-25 RX ADMIN — MIDAZOLAM 2 MG/HR: 5 INJECTION INTRAMUSCULAR; INTRAVENOUS at 05:02

## 2024-06-25 RX ADMIN — Medication: at 21:29

## 2024-06-25 RX ADMIN — PIPERACILLIN AND TAZOBACTAM 4500 MG: 4; .5 INJECTION, POWDER, LYOPHILIZED, FOR SOLUTION INTRAVENOUS at 06:46

## 2024-06-25 RX ADMIN — DEXMEDETOMIDINE 0.6 MCG/KG/HR: 200 INJECTION, SOLUTION INTRAVENOUS at 11:33

## 2024-06-25 RX ADMIN — DEXMEDETOMIDINE 1.1 MCG/KG/HR: 200 INJECTION, SOLUTION INTRAVENOUS at 23:18

## 2024-06-25 RX ADMIN — POLYVINYL ALCOHOL 1 DROP: 1.4 SOLUTION/ DROPS OPHTHALMIC at 06:10

## 2024-06-25 RX ADMIN — EPOETIN ALFA-EPBX 10000 UNITS: 10000 INJECTION, SOLUTION INTRAVENOUS; SUBCUTANEOUS at 18:21

## 2024-06-25 RX ADMIN — HEPARIN SODIUM 5000 UNITS: 5000 INJECTION INTRAVENOUS; SUBCUTANEOUS at 15:51

## 2024-06-25 RX ADMIN — FOLIC ACID 1 MG: 1 TABLET ORAL at 08:57

## 2024-06-25 RX ADMIN — Medication: at 08:17

## 2024-06-25 RX ADMIN — PIPERACILLIN AND TAZOBACTAM 4500 MG: 4; .5 INJECTION, POWDER, LYOPHILIZED, FOR SOLUTION INTRAVENOUS at 21:33

## 2024-06-25 RX ADMIN — DEXMEDETOMIDINE 1.1 MCG/KG/HR: 200 INJECTION, SOLUTION INTRAVENOUS at 17:48

## 2024-06-25 RX ADMIN — POLYVINYL ALCOHOL 1 DROP: 1.4 SOLUTION/ DROPS OPHTHALMIC at 00:12

## 2024-06-25 RX ADMIN — DEXMEDETOMIDINE 1.1 MCG/KG/HR: 200 INJECTION, SOLUTION INTRAVENOUS at 20:52

## 2024-06-25 RX ADMIN — LANSOPRAZOLE 30 MG: 30 TABLET, ORALLY DISINTEGRATING, DELAYED RELEASE ORAL at 08:57

## 2024-06-25 RX ADMIN — HEPARIN SODIUM 5000 UNITS: 5000 INJECTION INTRAVENOUS; SUBCUTANEOUS at 06:46

## 2024-06-25 RX ADMIN — VASOPRESSIN 0.03 UNITS/MIN: 20 INJECTION INTRAVENOUS at 18:30

## 2024-06-25 RX ADMIN — VASOPRESSIN 0.03 UNITS/MIN: 20 INJECTION INTRAVENOUS at 06:09

## 2024-06-25 RX ADMIN — Medication: at 21:28

## 2024-06-25 RX ADMIN — CALCIUM GLUCONATE 1000 MG: 10 INJECTION, SOLUTION INTRAVENOUS at 02:18

## 2024-06-25 RX ADMIN — NOREPINEPHRINE BITARTRATE 35 MCG/MIN: 1 INJECTION, SOLUTION, CONCENTRATE INTRAVENOUS at 06:08

## 2024-06-25 RX ADMIN — LIDOCAINE HYDROCHLORIDE 5 ML: 10 INJECTION, SOLUTION EPIDURAL; INFILTRATION; INTRACAUDAL; PERINEURAL at 14:27

## 2024-06-25 RX ADMIN — POLYVINYL ALCOHOL 1 DROP: 1.4 SOLUTION/ DROPS OPHTHALMIC at 18:59

## 2024-06-25 RX ADMIN — POLYVINYL ALCOHOL 1 DROP: 1.4 SOLUTION/ DROPS OPHTHALMIC at 10:20

## 2024-06-25 RX ADMIN — DEXMEDETOMIDINE 1.4 MCG/KG/HR: 200 INJECTION, SOLUTION INTRAVENOUS at 03:30

## 2024-06-25 RX ADMIN — HEPARIN SODIUM 5000 UNITS: 5000 INJECTION INTRAVENOUS; SUBCUTANEOUS at 21:30

## 2024-06-25 RX ADMIN — Medication 125 MCG/HR: at 01:25

## 2024-06-25 RX ADMIN — DEXMEDETOMIDINE 1.4 MCG/KG/HR: 200 INJECTION, SOLUTION INTRAVENOUS at 02:06

## 2024-06-25 RX ADMIN — Medication 150 MCG/HR: at 09:26

## 2024-06-25 RX ADMIN — POLYVINYL ALCOHOL 1 DROP: 1.4 SOLUTION/ DROPS OPHTHALMIC at 14:39

## 2024-06-25 RX ADMIN — CALCIUM GLUCONATE 1000 MG: 10 INJECTION, SOLUTION INTRAVENOUS at 13:52

## 2024-06-25 RX ADMIN — SODIUM PHOSPHATE, MONOBASIC, MONOHYDRATE AND SODIUM PHOSPHATE, DIBASIC, ANHYDROUS 6 MMOL: 142; 276 INJECTION, SOLUTION INTRAVENOUS at 08:47

## 2024-06-25 ASSESSMENT — PULMONARY FUNCTION TESTS
PIF_VALUE: 18
PIF_VALUE: 45
PIF_VALUE: 18
PIF_VALUE: 24
PIF_VALUE: 24
PIF_VALUE: 26
PIF_VALUE: 25
PIF_VALUE: 27
PIF_VALUE: 15
PIF_VALUE: 25
PIF_VALUE: 24
PIF_VALUE: 25
PIF_VALUE: 21
PIF_VALUE: 25
PIF_VALUE: 25
PIF_VALUE: 24
PIF_VALUE: 27
PIF_VALUE: 26
PIF_VALUE: 12
PIF_VALUE: 27
PIF_VALUE: 30
PIF_VALUE: 18
PIF_VALUE: 25
PIF_VALUE: 23
PIF_VALUE: 27
PIF_VALUE: 19
PIF_VALUE: 27
PIF_VALUE: 25
PIF_VALUE: 23
PIF_VALUE: 17
PIF_VALUE: 18
PIF_VALUE: 26
PIF_VALUE: 24
PIF_VALUE: 20
PIF_VALUE: 27
PIF_VALUE: 24
PIF_VALUE: 25
PIF_VALUE: 26
PIF_VALUE: 12
PIF_VALUE: 43
PIF_VALUE: 46
PIF_VALUE: 12
PIF_VALUE: 26
PIF_VALUE: 24
PIF_VALUE: 24
PIF_VALUE: 27
PIF_VALUE: 27
PIF_VALUE: 25
PIF_VALUE: 25
PIF_VALUE: 19
PIF_VALUE: 14
PIF_VALUE: 25

## 2024-06-25 ASSESSMENT — PAIN SCALES - GENERAL
PAINLEVEL_OUTOF10: 0
PAINLEVEL_OUTOF10: 3
PAINLEVEL_OUTOF10: 4

## 2024-06-25 ASSESSMENT — PAIN DESCRIPTION - LOCATION: LOCATION: FOOT

## 2024-06-25 ASSESSMENT — PAIN DESCRIPTION - DESCRIPTORS: DESCRIPTORS: PATIENT UNABLE TO DESCRIBE

## 2024-06-25 ASSESSMENT — PAIN DESCRIPTION - ORIENTATION: ORIENTATION: RIGHT

## 2024-06-25 NOTE — PROGRESS NOTES
[facility] -  Clinical Pharmacy Note    Vancomycin - Management by Pharmacy    Consult Date(s): 6/21/24  Consulting Provider(s):  Dr. Hammer    Assessment / Plan  PNA, potential LE cellulitis vs SJS - Vancomycin  Concurrent Antimicrobials: Zosyn (Day #5 of 7)  Day of Vanc Therapy / Ordered Duration: Day #5 of 7  Current Dosing Method: Intermittent Dosing by Levels  Therapeutic Goal: Trough ~ 15 mg/L  Current Dose / Plan:   Pt remains on CRRT.  Will continue to dose vancomycin intermittently based on levels  Level today = 14.5 mg/L  Will re-dose vancomycin today with 1500 mg IV x1 today  Plan to obtain a random level 6/26 AM and reassess  Will continue to monitor clinical condition and make adjustments to regimen as appropriate    Thank you for consulting pharmacy.    Milvia FloydD., BCPS   6/25/2024 7:26 AM  Wireless: [phone]        Interval update:  Patient remains intubated, with midazolam added to fentanyl and dexmedetomidine for sedation.  Continues on vasopressor support.  Plastic Surgery consulted for concern for SJS/TENS - who recommend consulting [facility] as well as ID.  Punch bx planned for today.  Continuing vancomycin for skin issues.  Currently on CRRT.      Subjective/Objective: Marcela Colby is a 64 y.o. female with a PMHx significant for HLD, HTN, venous thrombosis and embolism, chronic lymphedema, hypothyroidism, Vascular dementia, CKD stage-V.  Presented from NH with SOB and lower leg pain/swelling.  PEA cardiac arrest in ED and achieved ROSC after several rounds of CPR. Admitted 6/21 to ICU with aspiration pneumonia vs LE cellulitis, septic shock, possible PE.     Pharmacy is consulted to dose vancomycin.    Ht Readings from Last 1 Encounters:   06/22/24 1.6 m (5' 3\")     Wt Readings from Last 1 Encounters:   06/23/24 (!) 143.4 kg (316 lb 2.2 oz)     Current & Prior Antimicrobial Regimen(s):   (6/21)  Zosyn (6/21-current)  Vancomycin - pharmacy to dose

## 2024-06-25 NOTE — PLAN OF CARE
Problem: Safety - Adult  Goal: Free from fall injury  6/25/2024 1041 by Cheryl Rose RN  Outcome: Progressing  Flowsheets (Taken 6/25/2024 0840)  Free From Fall Injury: Instruct family/caregiver on patient safety  6/25/2024 0656 by Melina Ricketts RN  Outcome: Progressing  Flowsheets (Taken 6/25/2024 0655)  Free From Fall Injury: Instruct family/caregiver on patient safety     Problem: Discharge Planning  Goal: Discharge to home or other facility with appropriate resources  6/25/2024 1041 by Cheryl Rose RN  Outcome: Progressing  Flowsheets (Taken 6/25/2024 0800)  Discharge to home or other facility with appropriate resources:   Identify barriers to discharge with patient and caregiver   Arrange for needed discharge resources and transportation as appropriate   Identify discharge learning needs (meds, wound care, etc)   Arrange for interpreters to assist at discharge as needed  6/25/2024 0656 by Melina Ricketts RN  Outcome: Not Progressing  Flowsheets (Taken 6/24/2024 2000)  Discharge to home or other facility with appropriate resources: Identify barriers to discharge with patient and caregiver     Problem: Pain  Goal: Verbalizes/displays adequate comfort level or baseline comfort level  6/25/2024 1041 by Cheryl Rose RN  Outcome: Progressing  Flowsheets (Taken 6/25/2024 0700)  Verbalizes/displays adequate comfort level or baseline comfort level:   Assess pain using appropriate pain scale   Administer analgesics based on type and severity of pain and evaluate response   Implement non-pharmacological measures as appropriate and evaluate response   Consider cultural and social influences on pain and pain management  6/25/2024 0656 by Melina Ricketts RN  Outcome: Progressing  Flowsheets (Taken 6/25/2024 0400)  Verbalizes/displays adequate comfort level or baseline comfort level: Encourage patient to monitor pain and request assistance     Problem: Safety - Medical Restraint  Goal:  measures have been tried or would not be effective before applying the restraint  Taken 6/25/2024 0200 by Melina Ricketts RN  Remains free of injury from restraints (restraint for interference with medical device): Determine that other, less restrictive measures have been tried or would not be effective before applying the restraint  Taken 6/25/2024 0000 by Melina Ricketts RN  Remains free of injury from restraints (restraint for interference with medical device): Determine that other, less restrictive measures have been tried or would not be effective before applying the restraint  Taken 6/24/2024 2332 by Melina Ricketts RN  Remains free of injury from restraints (restraint for interference with medical device): Determine that other, less restrictive measures have been tried or would not be effective before applying the restraint  Taken 6/24/2024 2200 by Melina Ricketts RN  Remains free of injury from restraints (restraint for interference with medical device): Determine that other, less restrictive measures have been tried or would not be effective before applying the restraint  Taken 6/24/2024 2000 by Melina Ricketts RN  Remains free of injury from restraints (restraint for interference with medical device): Determine that other, less restrictive measures have been tried or would not be effective before applying the restraint  Taken 6/24/2024 1800 by Cheryl Rose RN  Remains free of injury from restraints (restraint for interference with medical device):   Determine that other, less restrictive measures have been tried or would not be effective before applying the restraint   Inform patient/family regarding the reason for restraint   Evaluate the patient's condition at the time of restraint application   Every 2 hours: Monitor safety, psychosocial status, comfort, nutrition and hydration     Problem: Skin/Tissue Integrity  Goal: Absence of new skin breakdown  Description: 1.  Monitor for

## 2024-06-25 NOTE — PROGRESS NOTES
[facility] Progress Note    CC: LUIS on CKD 4    64 y.o. year old female who we are seeing in consultation for acute kidney injury.  Patient has a history of chronic kidney disease stage IV.  Had PEA arrest on presentation.         Subjective/     Ongoing CRRT, tolerating  Attempting 50 ml/hr net off but still on 2 pressors   SBP better  Remains intubated, not interactive    Seen by Vascular Surgery for RLE - perfusion reasonable  Desquamation now  Seen by Plastic Surgery with skin biopsy done  Question of Elmore-Endy syndrome raised but skin lesions only on RLE    ROS: unable    No visitors    Objective/   GEN:  Chronically ill, /73   Pulse 65   Temp 97.9 °F (36.6 °C) (Bladder)   Resp 22   Ht 1.6 m (5' 3\")   Wt (!) 142.8 kg (314 lb 13.1 oz)   LMP  (LMP Unknown) Comment: unsure  SpO2 100%   BMI 55.77 kg/m²     GEN: morbidly obese, on vent, critically ill  HEENT: non-icteric, ETT in place  NECK: no JVD  CV: S1, S2 without m/r/g  RESP: diminished BS bibasilar  ABD: Soft, NT  SKIN: warm,   EXT: peripheral and dependent edema LLE and back; RLE with Desquamation lesions RLE  NEURO: sedated    Data/  CBC:   Recent Labs     06/23/24  0640 06/24/24 0324 06/25/24  0537   WBC 10.8 7.0 13.4*   RBC 2.88* 2.67* 2.63*   HGB 9.3* 8.6* 8.3*   HCT 27.3* 25.1* 24.8*   * 73* 64*       BMP:    Recent Labs     06/24/24 0324 06/24/24 1842 06/25/24  0536   * 136 136   K 4.7 4.1 4.0    103 103   CO2 22 23 21   BUN 19 10 9   CREATININE 1.6* 1.2 1.2   CALCIUM 7.9* 8.0* 8.2*   GLUCOSE 90 79 84       Phosphorus:    Recent Labs     06/24/24 0324 06/24/24 1842 06/25/24  0536   PHOS 2.0* 1.4* 2.0*       Magnesium:    Recent Labs     06/24/24  0324 06/24/24 1842 06/25/24  0536   MG 2.20 2.30 2.40       Hepatic Function Panel:    Lab Results   Component Value Date/Time    ALKPHOS 114 06/25/2024 05:36 AM     06/25/2024 05:36 AM     06/25/2024 05:36 AM    PROT 7.9  06/15/2017 12:12 PM    BILITOT 1.0 06/25/2024 05:36 AM    BILIDIR 0.4 06/22/2024 12:38 PM    IBILI 0.2 06/22/2024 12:38 PM             Assessment/     Acute kidney injury stage III likely ischemic ATN in the setting of prolonged PEA arrest  Oliguric  Patient had previously expressed she would not want dialysis  Discussions with Dr Gutierrez and legal guardian: decided to proceed with CRRT  Stable CRRT to this point  - continue CRRT acutely  - will have prn electrolyte replacement  - attempt volume off 50 ml/hr  - lwoer pressors as possible    Chronic kidney disease stage V secondary to hypertensive nephrosclerosis creatinine baseline around 4.7 mg/dL.  Following with Dr. Box  -Per previous documentation patient is not interested in long term renal replacement therapy.    S/p PEA arrest   requiring several rounds of CPR    Acute hypoxic respiratory failure  On vent    Shock  Sepsis and post cardiac arrest  On 2 pressors now  RLE showing changes of desquamation    Desquamation RLE  Seen by Vasc Surgery with reasonable perfusion  Seen by Plastic Surgery  Skin biopsy done on 6/25      Shock liver  Transaminases improved    Electrolytes  Acidosis improved  - will use prn electrolyte supplement on CRRT    Anemia  - start LISA    Prognosis limited if not poor  - Palliative Care involved  - full support at present        ____________________________________  Ron Cantu MD  [facility]  [email or web address]  Office: [phone]

## 2024-06-25 NOTE — Clinical Note
This procedure has been fully reviewed with the patient and written informed consent has been obtained.

## 2024-06-25 NOTE — PROGRESS NOTES
Plastic Surgery   Daily Progress Note  Patient: Marcela Colby      CC: concern for SJS    SUBJECTIVE:   Stable mental status.     ROS:   A 14 point review of systems was conducted, significant findings as noted above. All other systems negative.    OBJECTIVE:    PHYSICAL EXAM:    Vitals:    06/25/24 0000 06/25/24 0030 06/25/24 0100 06/25/24 0200   BP:       Pulse: 86  94 93   Resp: 20  18 18   Temp: (!) 95.9 °F (35.5 °C)      TempSrc: Bladder      SpO2: 96% 100%     Weight:       Height:           General appearance: intubated, sedated  Eyes: No scleral icterus, EOM grossly intact  Neck: Trachea midline, no JVD  Chest/Lungs:  on ventilator  Cardiovascular: RRR  Skin: Areas of bullae and sloughing epidermis across the right lower leg spreading up to the distal right thigh, also with desquamated areas with non-blanchable underlying soft tissue changes; pictures below  Extremities: chronic lymphedema bilaterally   Neuro: Unable to assess    LABS:   Recent Labs     06/24/24  0324 06/25/24  0537   WBC 7.0 13.4*   HGB 8.6* 8.3*   HCT 25.1* 24.8*   MCV 94.0 94.4   PLT 73* 64*        Recent Labs     06/24/24  1842 06/25/24  0536    136   K 4.1 4.0    103   CO2 23 21   PHOS 1.4* 2.0*   BUN 10 9   CREATININE 1.2 1.2        Recent Labs     06/22/24  1238 06/22/24  1820 06/24/24  1842 06/25/24  0536   AST 1,669*   < > 199* 143*   ALT 1,340*   < > 655* 533*   BILIDIR 0.4*  --   --   --    BILITOT 0.6   < > 0.9 1.0   ALKPHOS 35*   < > 84 114    < > = values in this interval not displayed.      No results for input(s): \"LIPASE\", \"AMYLASE\" in the last 72 hours.     Recent Labs     06/22/24  1239 06/23/24  0043   INR 2.80* 2.38*   APTT  --  39.7*      No results for input(s): \"CKTOTAL\", \"CKMB\", \"CKMBINDEX\", \"TROPONINI\" in the last 72 hours.      ASSESSMENT & PLAN:   This is a 64 y.o. female with  history of CKD, DVTs, HLD, HTN, thyroid disease, vascular dementia.  Contacted by vascular team due to concern for developing

## 2024-06-25 NOTE — PROGRESS NOTES
Patient is awake and very restless, RASS +1. She nods her head \"yes\" that she can hear me, and \"yes\" that she is in pain to \"yes\" her right leg. She is moving all extremities non-purposefully, but not following commands. Fentanyl infusing.

## 2024-06-25 NOTE — PROGRESS NOTES
Cardiology Consult Service  Daily Progress Note        Admit Date:  6/21/2024    Subjective:     Interval history:  Remains intubated, sedated    Objective:     Medications:   lidocaine-EPINEPHrine  20 mL IntraDERmal Once    silver nitrate applicators   Topical Once    epoetin shannon-epbx  10,000 Units SubCUTAneous Once per day on Tue Thu Sat    sodium chloride flush  5-40 mL IntraVENous 2 times per day    levothyroxine  50 mcg Oral Daily    calcitRIOL  0.5 mcg Oral Q MWF    folic acid  1 mg Oral Daily    lansoprazole  30 mg Orogastric QAM AC    polyvinyl alcohol  1 drop Both Eyes Q4H    piperacillin-tazobactam  4,500 mg IntraVENous Q8H    sodium chloride flush  5-40 mL IntraVENous 2 times per day    heparin (porcine)  5,000 Units SubCUTAneous 3 times per day    vancomycin (VANCOCIN) intermittent dosing (placeholder)   Other RX Placeholder       IV drips:   midazolam Stopped (06/25/24 0831)    sodium chloride      dexmedeTOMIDine (PRECEDEX) 400 mcg in sodium chloride 0.9 % 100 mL infusion 1.2 mcg/kg/hr (06/25/24 1620)    fentaNYL 175 mcg/hr (06/25/24 1554)    prismaSATE BGK 4/2.5 1,400 mL/hr at 06/25/24 1545    prismaSATE BGK 4/2.5 1,400 mL/hr at 06/25/24 1544    prismaSATE BGK 4/2.5 1,400 mL/hr at 06/25/24 1544    EPINEPHrine Stopped (06/24/24 0758)    sodium chloride      norepinephrine 12 mcg/min (06/25/24 1445)    VASOpressin 20 Units in sodium chloride 0.9 % 100 mL infusion 0.03 Units/min (06/25/24 0609)       PRN:  sodium chloride flush, sodium chloride, ALTEplase, potassium chloride, magnesium sulfate, calcium gluconate **OR** calcium gluconate **OR** calcium gluconate **OR** calcium gluconate, sodium phosphate 6 mmol in sodium chloride 0.9 % 250 mL IVPB **OR** sodium phosphate 12 mmol in sodium chloride 0.9 % 250 mL IVPB **OR** sodium phosphate 18 mmol in sodium chloride 0.9 % 500 mL IVPB **OR** sodium phosphate 24 mmol in sodium chloride 0.9 % 500 mL IVPB, sodium chloride flush **AND** sodium chloride flush,

## 2024-06-25 NOTE — PROGRESS NOTES
TRIPLE PICC PROCEDURE NOTE  Chart reviewed for allergies, diagnosis, labs, known contraindications, reason for line placement and planned length of treatment.  Informed consent noted to be signed and on chart.  Insertion procedure discussed with patient/family member.  Three patient identifiers - Patient name,   and MRN -  completed &  confirmed verbally.         Time out performed Hat, mask and eye shield donned.  PICC site cleaned with chlorhexidine wipes then scrubbed with Chloraprep  One-Step applicator for 30 seconds x 1.   Hand Hygiene  performed with 3% Chlorhexidine surgical scrub x1 min prior to  sterile gloves, sterile gown being donned.  Patient draped using maximal sterile barrier technique ( head to toe ).  PICC site scrubbed a 2nd time with Chloraprep One-Step applicator x 30 sec. Modified Seldinger technique/ultrasound assisted and tip locating system utilized for insertion and 1% Lidocaine 5 ml injected intradermal pre-insertion.  PICC tip location in the SVC confirmed by ECG technology.   Positive brisk blood return obtained from all lumens.   Valves applied to all lumens and flushed with 10 mls  0.9% Sterile Sodium Chloride.  All lumens flush easily with no resistance.   Skin prep applied to site.   Catheter secured with non-sutured locking device per hospital protocol. Bio-patch/CHG impregnated sterile tegaderm dressing applied.  Alcohol Swab Caps applied to each valve.  Sterile field maintained during procedure.  PICC insertion, rhythm and positioning wire (utilized prn) accounted for post procedure and disposed of in sharps.  Appearance of site is Clean dry and intact without bleeding or edema. All edges of Tegaderm occlusive.   Site marked with date and initials of RN placing line. Teaching performed to pt/family and noted in education section.   Bed placed in low position post-procedure. Top 2 side rails in up position call  button in reach.Pt. Response to procedure tolerated well.  RN notified of all of the above.

## 2024-06-25 NOTE — PROGRESS NOTES
06/25/24  0536   AST 1,669*   < > 360* 199* 143*   ALT 1,340*   < > 848* 655* 533*   BILITOT 0.6   < > 1.1* 0.9 1.0   BILIDIR 0.4*  --   --   --   --    ALKPHOS 35*   < > 79 84 114    < > = values in this interval not displayed.       Troponin: No results for input(s): \"TROPONINI\" in the last 72 hours.  BNP: No results for input(s): \"BNP\" in the last 72 hours.  Lipids: No results for input(s): \"CHOL\", \"HDL\" in the last 72 hours.    Invalid input(s): \"LDLCALCU\", \"TRIGLYCERIDE\"  ABGs:   Recent Labs     06/24/24  0324 06/24/24  1323 06/25/24  0536   PHART 7.284* 7.272* 7.268*   GDO6SWM 51.3* 53.2* 50.8*   PO2ART 132.0* 182.0* 119.0*   [id number] 24 25 23   BEART -2.5 -2.6 -4.3*   J5LMNXVD 99 100 98   AOR8QWB 26 26 25         INR:   Recent Labs     06/22/24  1239 06/23/24  0043   INR 2.80* 2.38*       Lactate:   Recent Labs     06/22/24  1552 06/23/24  1322 06/23/24  1844   LACTATE 2.79* 2.58* 2.20*       Cultures: Blood cultures x2 ORDERED NOT DRAWN  -----------------------------------------------------------------  Imaging/tests:    EKG:  Per ED note initial EKG pre-arrest was \"sinus tachycardia without evidence of ischemia\",    Post-arrest EKG NSR.      Echocardiogram:  06/22/2024 TTE:    Image quality is technically difficult. Contrast used: Definity. Technically difficult study due to patient's body habitus and procedure performed with the patient in a supine position.    Left Ventricle: Moderately reduced left ventricular systolic function with a visually estimated EF of 35 - 40%. Left ventricle size is normal. Normal wall thickness. Mild global hypokinesis present.    Right Ventricle: Not well visualized. Right ventricle size is normal.    Mitral Valve: Not well visualized. Mild regurgitation.    Tricuspid Valve: Not well visualized. Mild regurgitation.    10/2014 TTE:  Summary:   Overall left ventricular ejection fraction is estimated to be 60-65%.   The left ventricular wall motion is normal.   There is trace    Presented in respiratory distress, requiring CPAP via EMS. Initially high suspicion for PE given her history, body habitus, sepsis, presentation. However, given CKD 4, and pt coding in ED, CTPA was held off.     Vent Day: 4  Vent Settings: Vent Mode: SIMV/PRVC Resp Rate (Set): 20 bpm/Vt (Set, mL): 450 mL/ /FiO2 : 60 %    - Changed vent circuit to heated wire this AM due to thick secretions  - MRSA nares negative  - Continue broad spectrum abx for cellulitis, which will also cover her CAP  - Vanc/Zosyn (06/21 - )  - HOB > 30 degrees  - PPI ppx  - Triglycerides elevated, transitioned off propofol  - Versed, Precedex for sedation, Fentanyl for analgesia      Cardiovascular  S/p cardiac arrest  Cardiogenic vs septic shock  Concern for PE  NSTEMI  Unclear etiology for her arrest, reportedly PEA. Possibly d/t PE, sepsis. Troponins peaked at 496, but she arrested and isn't stable for an angio. Trop now down trending.   Pressor requirements improving.  - Cardiology consulted, appreciate recs  - Continue Levo and Vaso as needed for pressure support, wean as tolerated  - Will treat for PE if evidence is found  - CAP treatment per above  - Will exchange femoral CVC for PICC today, nephrology OK with PICC placement    [facility]  06/22 ECHO with EF 35-40%, mild global hypokinesis, unable to assess RV systolic function.  Does not appear to be volume overloaded.  - Treatment per above      FEN/GI  Acute liver failure 2/2 ischemia; improving  LFTs elevated, INR elevated to 2.8 post-arrest. LFTs now down trending after peaking at low 1k.   - Will trend hepatic function panel daily    Diet NPO  Pressor requirement too high for tube feeds    Renal   LUIS on CKD V  Baseline GFR around 11, now at 6-7 range post-arrest. Follows with nephrology, Dr. Box (. 's).  - Kidney numbers improved with CRRT  - Nephrology consulted, appreciate recs; on CRRT  - Pressor requirement too high for transition to intermittent dialysis at this  PaO2/FiO2 ratio is 170.  Discussed with Dr. Colbert, cardiology  Time spent in management of critical illness 55 minutes

## 2024-06-25 NOTE — PROGRESS NOTES
R femoral CVC removed after PICC placement. Patient tolerated well.    Alvarez removed per nurse-driven protocol with 10 mL of output this shift. Will bladder scan per protocol.

## 2024-06-25 NOTE — PROGRESS NOTES
Comprehensive Nutrition Assessment  Vasopressor dosing equivalent score = 14. For VDE >12 trophic TF only (10 mL/hr).    RECOMMENDATIONS:  Continue NPO  If pt remains NPO x5 days, will discuss trophic TF to maintain gut integrity, pending pressor requirements (NPO day #3 today)  Nutrition Education: Education not appropriate     NUTRITION ASSESSMENT:   Nutritional summary & status: Pt remains intubated, hypotensive on levo. VDE >12 not safe for nutrition support at this time. Newest TG lab came back at 1,318, propofol stopped last night and precedex started along with fentanyl. On CRRT, all electrolyte abnormalities being replaced per CRRT protocol. Pt has been NPO x3 days, if pt remains NPO for a total of 5 days will discuss trophic TF for gut integrity.   Admission // PMH: cardiac arrest // HLD, HTN, venous thrombosis and embolism, Vascular dementia, CKD    MALNUTRITION ASSESSMENT  Context of Malnutrition: Acute Illness   Malnutrition Status: At risk for malnutrition (Comment)  Findings of the 6 clinical characteristics of malnutrition (Minimum of 2 out of 6 clinical characteristics is required to make the diagnosis of moderate or severe Protein Calorie Malnutrition based on AND/ASPEN Guidelines):  Energy Intake:  Mild decrease in energy intake (Comment)  Weight Loss:  No significant weight loss     Body Fat Loss:  No significant body fat loss     Muscle Mass Loss:  No significant muscle mass loss      NUTRITION DIAGNOSIS   Inadequate oral intake related to impaired respiratory function as evidenced by intubation    Nutrition Monitoring and Evaluation:   Food/Nutrient Intake Outcomes:  Enteral Nutrition Intake/Tolerance  Physical Signs/Symptoms Outcomes:  Biochemical Data, Nutrition Focused Physical Findings, Weight, Hemodynamic Status     OBJECTIVE DATA: Significant to nutrition assessment  Nutrition Related Findings: Phos 2.0.  Wounds: None  Nutrition Goals: Initiate nutrition support     CURRENT NUTRITION

## 2024-06-25 NOTE — PROGRESS NOTES
V2.0    [facility] Progress Note      Name:  Marcela Colby /Age/Sex: 1960  (64 y.o. female)   MRN & CSN:  [id number] & [id number] Encounter Date/Time: 2024 9:13 AM EDT   Location:  [facility]/4514-01 PCP: Thalia Au MD     Attending:Abhishek Ham MD       Hospital Day: 5    Assessment and Recommendations   Marcela Colby is a 64 y.o. female with pmh of CKD stage V, hyperlipidemia, hypertension, dementia, pancytopenia, hypothyroidism who presents with Cardiac arrest (HCC).  Patient was sent for right lower leg pain and swelling.  Had a Doppler which was negative.  Patient came back to the ED with dyspnea shortness of breath.  Patient was placed on BiPAP and was febrile.  Patient then went into PEA cardiac arrest.  RCS was after 20 minutes.  Patient was intubated on admission.  She has septic shock secondary to pneumonia with volume overload LUIS on CKD.  Started on CRRT and pressors.  Pneumonia panel positive for Moraxella H influenza.  Urine for strep pneumonia positive      Plan:     Cardiac arrest  Concern for anoxic brain injury  -PEA cardiac arrest, cause of arrest unclear  -reported ~20 minutes until ROSC  -wean sedation as able    Acute respiratory Failure with hypoxia  Acute Pneumonia  - Pulm/Crit following, Vent management per their recs  - Continue Vanc + Zosyn, day 5    Shock, suspect septic +/- cardiogenic etiologies  Pressors as needed for goal MAP ~65    ARF superimposed on CKD V  Requiring RRT  Nephrology consulted, CRRT per their recs    Concern for RLE cellulitis  - Vascular surgery consulted by hospitalist for assistance  - Plastic surgery consulted by request of vascular surgery due to their concern for possible SJS/TEN  - Plastic surgery to perform punch biopsy to send out for dermopathology  - Wound care   - Vanc/Zosyn    Please see the note from critical care attending for detail, patient has a court appointed guardian as nursing home thought family was not involved.  Appears family is  6/21/2024  EXAM: XR TIBIA FIBULA RIGHT (2 VIEWS). DATE: 6/21/2024 5:36 AM. INDICATION: leg pain COMPARISON: None TECHNIQUE: Standard per department protocol. VIEWS OBTAINED: 4 FINDINGS: No acute fracture or dislocation/subluxation. Moderate osteoarthrosis of the knee. The soft tissues are normal.     No acute osseous abnormality. Electronically signed by Jeremy Monroe    XR FOOT RIGHT (MIN 3 VIEWS)    Result Date: 6/21/2024  EXAM: XR FOOT RIGHT (MIN 3 VIEWS). DATE: 6/21/2024 5:36 AM. INDICATION: foot pain COMPARISON: None TECHNIQUE: Standard per department protocol. VIEWS OBTAINED: 4 FINDINGS: No acute fracture or dislocation/subluxation. Plantar calcaneal spur. Joint spaces intact. Dorsal forefoot soft tissue swelling.     No acute osseous abnormality. Dorsal forefoot soft tissue swelling. Electronically signed by Jeremy Monroe      CBC:   Recent Labs     06/23/24  0640 06/24/24  0324 06/25/24  0537   WBC 10.8 7.0 13.4*   HGB 9.3* 8.6* 8.3*   * 73* 64*     BMP:    Recent Labs     06/24/24  0324 06/24/24  1842 06/25/24  0536   * 136 136   K 4.7 4.1 4.0    103 103   CO2 22 23 21   BUN 19 10 9   CREATININE 1.6* 1.2 1.2   GLUCOSE 90 79 84     Hepatic:   Recent Labs     06/24/24  0324 06/24/24  1842 06/25/24  0536   * 199* 143*   * 655* 533*   BILITOT 1.1* 0.9 1.0   ALKPHOS 79 84 114     Lipids:   Lab Results   Component Value Date/Time    TRIG 1,318 06/24/2024 10:41 AM     Hemoglobin A1C: No results found for: \"LABA1C\"  TSH:   Lab Results   Component Value Date/Time    TSH 1.02 06/22/2024 12:39 PM     Troponin: No results found for: \"TROPONINT\"  Lactic Acid:   Recent Labs     06/23/24  0310 06/23/24  0640 06/24/24  0033   LACTA 3.4* 3.7* 1.7     BNP:   No results for input(s): \"PROBNP\" in the last 72 hours.    UA:  Lab Results   Component Value Date/Time    NITRU Negative 06/22/2024 03:23 AM    COLORU Yellow 06/22/2024 03:23 AM    PHUR 6.0 06/22/2024 03:23 AM    WBCUA 0-2

## 2024-06-25 NOTE — PROGRESS NOTES
Patient very agitated, shaking head, trying to sit up and gagging on ETT.  Patient shakes head \"yes\" to being in pain. Unable to make any further titrations on fentanyl and precedex at this time. ICU residents notified, one time 25mcg bolus given via verbal order from Dr. Colon.

## 2024-06-25 NOTE — PROGRESS NOTES
Patient's breathing pattern more comfortable since starting the versed gtt. Increase in pressor requirement overnight.

## 2024-06-25 NOTE — PROGRESS NOTES
Around 0300 CRRT machine alarming high TMP pressures, blood returned to patient and circuit taken down. CRRT restarted at 0400, patient tolerated well.

## 2024-06-25 NOTE — PROGRESS NOTES
Surgical residents bedside for punch biopsy of leg wounds.  Patient tolerated well without residual bleeding. Dressing in place.    Spoke with patient's legal guardian this morning at length with patient updates. Per guardian, both daughters may be updated on patient's medical condition, though she would like to be kept updated first.

## 2024-06-25 NOTE — PROCEDURES
PROCEDURE NOTE  Date: 6/25/2024   Name: Marcela Colby  YOB: 1960    Derm biopsy    Date/Time: 6/25/2024 8:07 AM    Performed by: Aure Salvador MD  Authorized by: Aure Salvador MD  Consent: Written consent obtained.  Risks and benefits: risks, benefits and alternatives were discussed  Consent given by: guardian  Patient identity confirmed: arm band  Preparation: Patient was prepped and draped in the usual sterile fashion.  Local anesthesia used: yes  Anesthesia: local infiltration    Anesthesia:  Local anesthesia used: yes  Local Anesthetic: lidocaine 1% with epinephrine  Anesthetic total: 7 mL    Sedation:  Patient sedated: yes  Sedation type: (sedated prior given intubated and ventilated)    Patient tolerance: patient tolerated the procedure well with no immediate complications          Skin lesion punch biopsy      After obtaining informed consent, the right lower extremity was prepped and draped in the usual fashion.     Anesthesia was obtained with 1% lidocaine with epi. A full thickness punch biopsy was obtained with a 4mm punch at the following: one area at the margin of heathy and diseased skin, submitted in formalin, one area entirely within diseased skin, submitted in formalin, and one area at the margin of healthy and diseased skin, submitted in Eulalio. Hemostasis was maintained with pressure. Wound dressed with mepilex. Specimen submitted to pathology.     EBL < 2 cc.     Aure Salvador MD  PGY1, General Surgery  06/25/24   8:09 AM   [facility]  [phone]

## 2024-06-25 NOTE — PROGRESS NOTES
Patient in afib on the monitor, residents notified and stat EKG obtained verifying rhythm, rate controlled in the 90s. In addition to rhythm changes, patient's breathing more shallow and labored. Arterial line is now dampened and positional and does not correlate with a cuff pressure. Residents notified of all the above and at bedside to evaluate patient, new order for versed gtt placed to see if the breathing is sedation related.

## 2024-06-25 NOTE — PLAN OF CARE
Problem: Discharge Planning  Goal: Discharge to home or other facility with appropriate resources  Outcome: Not Progressing  Flowsheets (Taken 6/24/2024 2000)  Discharge to home or other facility with appropriate resources: Identify barriers to discharge with patient and caregiver     Problem: Safety - Medical Restraint  Goal: Remains free of injury from restraints (Restraint for Interference with Medical Device)  Description: INTERVENTIONS:  1. Determine that other, less restrictive measures have been tried or would not be effective before applying the restraint  2. Evaluate the patient's condition at the time of restraint application  3. Inform patient/family regarding the reason for restraint  4. Q2H: Monitor safety, psychosocial status, comfort, nutrition and hydration  Outcome: Not Progressing  Flowsheets  Taken 6/25/2024 0600 by Melina Ricketts RN  Remains free of injury from restraints (restraint for interference with medical device): Determine that other, less restrictive measures have been tried or would not be effective before applying the restraint  Problem: Skin/Tissue Integrity  Goal: Absence of new skin breakdown  Description: 1.  Monitor for areas of redness and/or skin breakdown  2.  Assess vascular access sites hourly  3.  Every 4-6 hours minimum:  Change oxygen saturation probe site  4.  Every 4-6 hours:  If on nasal continuous positive airway pressure, respiratory therapy assess nares and determine need for appliance change or resting period.  Outcome: Not Progressing     Problem: Cardiovascular - Adult  Goal: Maintains optimal cardiac output and hemodynamic stability  Outcome: Not Progressing  Flowsheets (Taken 6/24/2024 2000)  Maintains optimal cardiac output and hemodynamic stability: Monitor blood pressure and heart rate

## 2024-06-25 NOTE — PROGRESS NOTES
US bedside for arterial duplex of RLE.     Patient's right toes are slightly cooler than left, though warmer than this AM.    Toes are discolored and dark purple. Pedal and PT pulses noted via doppler US with surgical team bedside to assess.     Extensive blistering and weeping continues from right ankle to right upper thigh. Patient was very restless RASS +2 during testing - Sedation and pain medication increased per order.

## 2024-06-26 ENCOUNTER — APPOINTMENT (OUTPATIENT)
Dept: VASCULAR LAB | Age: 64
DRG: 870 | End: 2024-06-26
Attending: INTERNAL MEDICINE
Payer: MEDICARE

## 2024-06-26 LAB
ALBUMIN SERPL-MCNC: 2.3 G/DL (ref 3.4–5)
ALBUMIN SERPL-MCNC: 2.4 G/DL (ref 3.4–5)
ALBUMIN/GLOB SERPL: 0.7 {RATIO} (ref 1.1–2.2)
ALBUMIN/GLOB SERPL: 0.7 {RATIO} (ref 1.1–2.2)
ALP SERPL-CCNC: 136 U/L (ref 40–129)
ALP SERPL-CCNC: 146 U/L (ref 40–129)
ALT SERPL-CCNC: 282 U/L (ref 10–40)
ALT SERPL-CCNC: 343 U/L (ref 10–40)
ANION GAP SERPL CALCULATED.3IONS-SCNC: 12 MMOL/L (ref 3–16)
ANION GAP SERPL CALCULATED.3IONS-SCNC: 14 MMOL/L (ref 3–16)
ANISOCYTOSIS BLD QL SMEAR: ABNORMAL
AST SERPL-CCNC: 51 U/L (ref 15–37)
AST SERPL-CCNC: 65 U/L (ref 15–37)
BASE EXCESS BLDA CALC-SCNC: 0 MMOL/L (ref -3–3)
BASOPHILS # BLD: 0 K/UL (ref 0–0.2)
BASOPHILS NFR BLD: 0 %
BILIRUB SERPL-MCNC: 1.2 MG/DL (ref 0–1)
BILIRUB SERPL-MCNC: 1.4 MG/DL (ref 0–1)
BUN SERPL-MCNC: 10 MG/DL (ref 7–20)
BUN SERPL-MCNC: 9 MG/DL (ref 7–20)
CA-I BLD-SCNC: 1.12 MMOL/L (ref 1.12–1.32)
CA-I BLD-SCNC: 1.13 MMOL/L (ref 1.12–1.32)
CA-I BLD-SCNC: 1.14 MMOL/L (ref 1.12–1.32)
CALCIUM SERPL-MCNC: 8.4 MG/DL (ref 8.3–10.6)
CALCIUM SERPL-MCNC: 8.5 MG/DL (ref 8.3–10.6)
CHLORIDE SERPL-SCNC: 102 MMOL/L (ref 99–110)
CHLORIDE SERPL-SCNC: 102 MMOL/L (ref 99–110)
CO2 BLDA-SCNC: 25 MMOL/L
CO2 SERPL-SCNC: 21 MMOL/L (ref 21–32)
CO2 SERPL-SCNC: 21 MMOL/L (ref 21–32)
CREAT SERPL-MCNC: 0.9 MG/DL (ref 0.6–1.2)
CREAT SERPL-MCNC: 1 MG/DL (ref 0.6–1.2)
DEPRECATED RDW RBC AUTO: 15.9 % (ref 12.4–15.4)
ECHO BSA: 2.51 M2
EOSINOPHIL # BLD: 0.2 K/UL (ref 0–0.6)
EOSINOPHIL NFR BLD: 1 %
GFR SERPLBLD CREATININE-BSD FMLA CKD-EPI: 62 ML/MIN/{1.73_M2}
GFR SERPLBLD CREATININE-BSD FMLA CKD-EPI: 70 ML/MIN/{1.73_M2}
GLUCOSE BLD-MCNC: 103 MG/DL (ref 70–99)
GLUCOSE BLD-MCNC: 104 MG/DL (ref 70–99)
GLUCOSE BLD-MCNC: 112 MG/DL (ref 70–99)
GLUCOSE BLD-MCNC: 93 MG/DL (ref 70–99)
GLUCOSE SERPL-MCNC: 108 MG/DL (ref 70–99)
GLUCOSE SERPL-MCNC: 92 MG/DL (ref 70–99)
HCO3 BLDA-SCNC: 24.2 MMOL/L (ref 21–29)
HCT VFR BLD AUTO: 23.9 % (ref 36–48)
HGB BLD-MCNC: 7.9 G/DL (ref 12–16)
LYMPHOCYTES # BLD: 0.6 K/UL (ref 1–5.1)
LYMPHOCYTES NFR BLD: 3 %
MAGNESIUM SERPL-MCNC: 2.2 MG/DL (ref 1.8–2.4)
MAGNESIUM SERPL-MCNC: 2.3 MG/DL (ref 1.8–2.4)
MCH RBC QN AUTO: 31.1 PG (ref 26–34)
MCHC RBC AUTO-ENTMCNC: 33.1 G/DL (ref 31–36)
MCV RBC AUTO: 93.9 FL (ref 80–100)
METAMYELOCYTES NFR BLD MANUAL: 3 %
MICROCYTES BLD QL SMEAR: ABNORMAL
MONOCYTES # BLD: 2.2 K/UL (ref 0–1.3)
MONOCYTES NFR BLD: 11 %
MYELOCYTES NFR BLD MANUAL: 4 %
NEUTROPHILS # BLD: 17 K/UL (ref 1.7–7.7)
NEUTROPHILS NFR BLD: 70 %
NEUTS BAND NFR BLD MANUAL: 8 % (ref 0–7)
PATH INTERP BLD-IMP: NO
PCO2 BLDA: 37 MM HG (ref 35–45)
PERFORMED ON: ABNORMAL
PERFORMED ON: NORMAL
PH BLDA: 7.42 [PH] (ref 7.35–7.45)
PH VENOUS: 7.42 (ref 7.35–7.45)
PH VENOUS: 7.44 (ref 7.35–7.45)
PH VENOUS: 7.44 (ref 7.35–7.45)
PHOSPHATE SERPL-MCNC: 0.9 MG/DL (ref 2.5–4.9)
PHOSPHATE SERPL-MCNC: 1.8 MG/DL (ref 2.5–4.9)
PLATELET # BLD AUTO: 54 K/UL (ref 135–450)
PLATELET BLD QL SMEAR: ABNORMAL
PMV BLD AUTO: 9.7 FL (ref 5–10.5)
PO2 BLDA: 76.5 MM HG (ref 75–108)
POC SAMPLE TYPE: ABNORMAL
POTASSIUM SERPL-SCNC: 3.9 MMOL/L (ref 3.5–5.1)
POTASSIUM SERPL-SCNC: 4 MMOL/L (ref 3.5–5.1)
PROT SERPL-MCNC: 5.5 G/DL (ref 6.4–8.2)
PROT SERPL-MCNC: 5.7 G/DL (ref 6.4–8.2)
RBC # BLD AUTO: 2.55 M/UL (ref 4–5.2)
SAO2 % BLDA: 96 % (ref 93–100)
SLIDE REVIEW: ABNORMAL
SODIUM SERPL-SCNC: 135 MMOL/L (ref 136–145)
SODIUM SERPL-SCNC: 137 MMOL/L (ref 136–145)
VANCOMYCIN SERPL-MCNC: 15.8 UG/ML
VAS LEFT ABI: 1
VAS LEFT ATA DIST PSV: 27.7 CM/S
VAS LEFT CFA DIST PSV: 136 CM/S
VAS LEFT DORSALIS PEDIS BP: 80 MMHG
VAS LEFT PERONEAL MID PSV: 22.3 CM/S
VAS LEFT PFA PROX PSV: 72.9 CM/S
VAS LEFT POP A PROX PSV: 87 CM/S
VAS LEFT POP A PROX VEL RATIO: 1.15
VAS LEFT PTA BP: 100 MMHG
VAS LEFT PTA DIST PSV: 37 CM/S
VAS LEFT PTA MID PSV: 36.4 CM/S
VAS LEFT SFA DIST PSV: 75.8 CM/S
VAS LEFT SFA DIST VEL RATIO: 0.86
VAS LEFT SFA MID PSV: 88.6 CM/S
VAS LEFT SFA MID VEL RATIO: 0.86
VAS LEFT SFA PROX PSV: 103 CM/S
VAS LEFT SFA PROX VEL RATIO: 0.76
VAS RIGHT ABI: 1.02
VAS RIGHT ARM BP: 100 MMHG
VAS RIGHT ATA DIST PSV: 65.2 CM/S
VAS RIGHT CFA DIST PSV: 94.1 CM/S
VAS RIGHT DORSALIS PEDIS BP: 102 MMHG
VAS RIGHT PERONEAL MID PSV: 58 CM/S
VAS RIGHT POP A DIST PSV: 51.1 CM/S
VAS RIGHT POP A PROX PSV: 60.9 CM/S
VAS RIGHT POP A PROX VEL RATIO: 0.52
VAS RIGHT PTA BP: 44 MMHG
VAS RIGHT PTA DIST PSV: 58.1 CM/S
VAS RIGHT PTA MID PSV: 30.8 CM/S
VAS RIGHT SFA DIST PSV: 117 CM/S
VAS RIGHT SFA DIST VEL RATIO: 1.45
VAS RIGHT SFA MID PSV: 80.7 CM/S
VAS RIGHT SFA MID VEL RATIO: 0.9
VAS RIGHT SFA PROX PSV: 86.7 CM/S
VAS RIGHT SFA PROX VEL RATIO: 0.9
WBC # BLD AUTO: 20 K/UL (ref 4–11)

## 2024-06-26 PROCEDURE — 6360000002 HC RX W HCPCS

## 2024-06-26 PROCEDURE — 2580000003 HC RX 258: Performed by: INTERNAL MEDICINE

## 2024-06-26 PROCEDURE — 84478 ASSAY OF TRIGLYCERIDES: CPT

## 2024-06-26 PROCEDURE — 82947 ASSAY GLUCOSE BLOOD QUANT: CPT

## 2024-06-26 PROCEDURE — 82803 BLOOD GASES ANY COMBINATION: CPT

## 2024-06-26 PROCEDURE — 94761 N-INVAS EAR/PLS OXIMETRY MLT: CPT

## 2024-06-26 PROCEDURE — 6370000000 HC RX 637 (ALT 250 FOR IP)

## 2024-06-26 PROCEDURE — 93923 UPR/LXTR ART STDY 3+ LVLS: CPT

## 2024-06-26 PROCEDURE — 2700000000 HC OXYGEN THERAPY PER DAY

## 2024-06-26 PROCEDURE — 93923 UPR/LXTR ART STDY 3+ LVLS: CPT | Performed by: SURGERY

## 2024-06-26 PROCEDURE — 90945 DIALYSIS ONE EVALUATION: CPT

## 2024-06-26 PROCEDURE — 84100 ASSAY OF PHOSPHORUS: CPT

## 2024-06-26 PROCEDURE — 83735 ASSAY OF MAGNESIUM: CPT

## 2024-06-26 PROCEDURE — 86038 ANTINUCLEAR ANTIBODIES: CPT

## 2024-06-26 PROCEDURE — 99291 CRITICAL CARE FIRST HOUR: CPT | Performed by: INTERNAL MEDICINE

## 2024-06-26 PROCEDURE — 86160 COMPLEMENT ANTIGEN: CPT

## 2024-06-26 PROCEDURE — 87040 BLOOD CULTURE FOR BACTERIA: CPT

## 2024-06-26 PROCEDURE — 80053 COMPREHEN METABOLIC PANEL: CPT

## 2024-06-26 PROCEDURE — 82330 ASSAY OF CALCIUM: CPT

## 2024-06-26 PROCEDURE — 94003 VENT MGMT INPAT SUBQ DAY: CPT

## 2024-06-26 PROCEDURE — 2000000000 HC ICU R&B

## 2024-06-26 PROCEDURE — 2580000003 HC RX 258

## 2024-06-26 PROCEDURE — 99233 SBSQ HOSP IP/OBS HIGH 50: CPT | Performed by: INTERNAL MEDICINE

## 2024-06-26 PROCEDURE — 2500000003 HC RX 250 WO HCPCS

## 2024-06-26 PROCEDURE — 80202 ASSAY OF VANCOMYCIN: CPT

## 2024-06-26 PROCEDURE — 85025 COMPLETE CBC W/AUTO DIFF WBC: CPT

## 2024-06-26 PROCEDURE — 36415 COLL VENOUS BLD VENIPUNCTURE: CPT

## 2024-06-26 PROCEDURE — 2500000003 HC RX 250 WO HCPCS: Performed by: INTERNAL MEDICINE

## 2024-06-26 RX ORDER — POLYETHYLENE GLYCOL 3350 17 G/17G
17 POWDER, FOR SOLUTION ORAL DAILY
Status: DISCONTINUED | OUTPATIENT
Start: 2024-06-26 | End: 2024-06-27

## 2024-06-26 RX ORDER — INSULIN LISPRO 100 [IU]/ML
0-4 INJECTION, SOLUTION INTRAVENOUS; SUBCUTANEOUS EVERY 6 HOURS
Status: DISCONTINUED | OUTPATIENT
Start: 2024-06-26 | End: 2024-06-30 | Stop reason: HOSPADM

## 2024-06-26 RX ORDER — SENNOSIDES A AND B 8.6 MG/1
1 TABLET, FILM COATED ORAL 2 TIMES DAILY
Status: DISCONTINUED | OUTPATIENT
Start: 2024-06-26 | End: 2024-06-26 | Stop reason: SDUPTHER

## 2024-06-26 RX ORDER — SENNA AND DOCUSATE SODIUM 50; 8.6 MG/1; MG/1
1 TABLET, FILM COATED ORAL 2 TIMES DAILY
Status: DISCONTINUED | OUTPATIENT
Start: 2024-06-26 | End: 2024-06-27

## 2024-06-26 RX ORDER — GLUCAGON 1 MG/ML
1 KIT INJECTION PRN
Status: DISCONTINUED | OUTPATIENT
Start: 2024-06-26 | End: 2024-06-30 | Stop reason: HOSPADM

## 2024-06-26 RX ORDER — DEXTROSE MONOHYDRATE 100 MG/ML
INJECTION, SOLUTION INTRAVENOUS CONTINUOUS PRN
Status: DISCONTINUED | OUTPATIENT
Start: 2024-06-26 | End: 2024-06-30 | Stop reason: HOSPADM

## 2024-06-26 RX ADMIN — POLYVINYL ALCOHOL 1 DROP: 1.4 SOLUTION/ DROPS OPHTHALMIC at 00:00

## 2024-06-26 RX ADMIN — POLYVINYL ALCOHOL 1 DROP: 1.4 SOLUTION/ DROPS OPHTHALMIC at 07:43

## 2024-06-26 RX ADMIN — DOCUSATE SODIUM 50MG AND SENNOSIDES 8.6MG 1 TABLET: 8.6; 5 TABLET, FILM COATED ORAL at 10:48

## 2024-06-26 RX ADMIN — LANSOPRAZOLE 30 MG: 30 TABLET, ORALLY DISINTEGRATING, DELAYED RELEASE ORAL at 07:41

## 2024-06-26 RX ADMIN — PIPERACILLIN AND TAZOBACTAM 4500 MG: 4; .5 INJECTION, POWDER, LYOPHILIZED, FOR SOLUTION INTRAVENOUS at 05:47

## 2024-06-26 RX ADMIN — Medication: at 00:51

## 2024-06-26 RX ADMIN — DEXMEDETOMIDINE 1.1 MCG/KG/HR: 200 INJECTION, SOLUTION INTRAVENOUS at 19:25

## 2024-06-26 RX ADMIN — Medication: at 11:28

## 2024-06-26 RX ADMIN — Medication: at 04:24

## 2024-06-26 RX ADMIN — Medication: at 19:10

## 2024-06-26 RX ADMIN — VASOPRESSIN 0.02 UNITS/MIN: 20 INJECTION INTRAVENOUS at 06:32

## 2024-06-26 RX ADMIN — DEXMEDETOMIDINE 1.1 MCG/KG/HR: 200 INJECTION, SOLUTION INTRAVENOUS at 13:08

## 2024-06-26 RX ADMIN — HEPARIN SODIUM 5000 UNITS: 5000 INJECTION INTRAVENOUS; SUBCUTANEOUS at 22:07

## 2024-06-26 RX ADMIN — WATER 125 MG: 1 INJECTION INTRAMUSCULAR; INTRAVENOUS; SUBCUTANEOUS at 14:21

## 2024-06-26 RX ADMIN — HEPARIN SODIUM 5000 UNITS: 5000 INJECTION INTRAVENOUS; SUBCUTANEOUS at 05:52

## 2024-06-26 RX ADMIN — Medication: at 19:12

## 2024-06-26 RX ADMIN — Medication: at 04:25

## 2024-06-26 RX ADMIN — Medication: at 07:53

## 2024-06-26 RX ADMIN — CALCIUM GLUCONATE 1000 MG: 10 INJECTION, SOLUTION INTRAVENOUS at 18:17

## 2024-06-26 RX ADMIN — LEVOTHYROXINE SODIUM 50 MCG: 50 TABLET ORAL at 05:06

## 2024-06-26 RX ADMIN — Medication 150 MCG/HR: at 17:43

## 2024-06-26 RX ADMIN — Medication: at 15:32

## 2024-06-26 RX ADMIN — NOREPINEPHRINE BITARTRATE 10 MCG/MIN: 1 INJECTION, SOLUTION, CONCENTRATE INTRAVENOUS at 06:16

## 2024-06-26 RX ADMIN — FOLIC ACID 1 MG: 1 TABLET ORAL at 07:41

## 2024-06-26 RX ADMIN — DOCUSATE SODIUM 50MG AND SENNOSIDES 8.6MG 1 TABLET: 8.6; 5 TABLET, FILM COATED ORAL at 23:30

## 2024-06-26 RX ADMIN — Medication: at 15:35

## 2024-06-26 RX ADMIN — SODIUM PHOSPHATE, MONOBASIC, MONOHYDRATE AND SODIUM PHOSPHATE, DIBASIC, ANHYDROUS 30 MMOL: 142; 276 INJECTION, SOLUTION INTRAVENOUS at 14:56

## 2024-06-26 RX ADMIN — DEXMEDETOMIDINE 1.1 MCG/KG/HR: 200 INJECTION, SOLUTION INTRAVENOUS at 10:07

## 2024-06-26 RX ADMIN — POLYETHYLENE GLYCOL 3350 17 G: 17 POWDER, FOR SOLUTION ORAL at 10:48

## 2024-06-26 RX ADMIN — SODIUM CHLORIDE, PRESERVATIVE FREE 10 ML: 5 INJECTION INTRAVENOUS at 07:43

## 2024-06-26 RX ADMIN — DEXMEDETOMIDINE 1.1 MCG/KG/HR: 200 INJECTION, SOLUTION INTRAVENOUS at 22:14

## 2024-06-26 RX ADMIN — Medication 125 MCG/HR: at 10:09

## 2024-06-26 RX ADMIN — DEXMEDETOMIDINE 1.1 MCG/KG/HR: 200 INJECTION, SOLUTION INTRAVENOUS at 07:15

## 2024-06-26 RX ADMIN — DEXMEDETOMIDINE 1.1 MCG/KG/HR: 200 INJECTION, SOLUTION INTRAVENOUS at 16:13

## 2024-06-26 RX ADMIN — HEPARIN SODIUM 5000 UNITS: 5000 INJECTION INTRAVENOUS; SUBCUTANEOUS at 14:21

## 2024-06-26 RX ADMIN — Medication 150 MCG/HR: at 01:53

## 2024-06-26 RX ADMIN — SODIUM CHLORIDE, PRESERVATIVE FREE 1.2 ML: 5 INJECTION INTRAVENOUS at 23:23

## 2024-06-26 RX ADMIN — POLYVINYL ALCOHOL 1 DROP: 1.4 SOLUTION/ DROPS OPHTHALMIC at 20:00

## 2024-06-26 RX ADMIN — WATER 60 MG: 1 INJECTION INTRAMUSCULAR; INTRAVENOUS; SUBCUTANEOUS at 22:05

## 2024-06-26 RX ADMIN — SODIUM CHLORIDE, PRESERVATIVE FREE 1.2 ML: 5 INJECTION INTRAVENOUS at 23:24

## 2024-06-26 RX ADMIN — PIPERACILLIN AND TAZOBACTAM 4500 MG: 4; .5 INJECTION, POWDER, LYOPHILIZED, FOR SOLUTION INTRAVENOUS at 22:11

## 2024-06-26 RX ADMIN — PIPERACILLIN AND TAZOBACTAM 4500 MG: 4; .5 INJECTION, POWDER, LYOPHILIZED, FOR SOLUTION INTRAVENOUS at 14:14

## 2024-06-26 RX ADMIN — POLYVINYL ALCOHOL 1 DROP: 1.4 SOLUTION/ DROPS OPHTHALMIC at 15:32

## 2024-06-26 RX ADMIN — DEXMEDETOMIDINE 1.1 MCG/KG/HR: 200 INJECTION, SOLUTION INTRAVENOUS at 02:07

## 2024-06-26 RX ADMIN — VASOPRESSIN 0.03 UNITS/MIN: 20 INJECTION INTRAVENOUS at 19:26

## 2024-06-26 RX ADMIN — POLYVINYL ALCOHOL 1 DROP: 1.4 SOLUTION/ DROPS OPHTHALMIC at 04:00

## 2024-06-26 RX ADMIN — DEXMEDETOMIDINE 1.1 MCG/KG/HR: 200 INJECTION, SOLUTION INTRAVENOUS at 04:41

## 2024-06-26 RX ADMIN — SODIUM CHLORIDE 1500 MG: 9 INJECTION, SOLUTION INTRAVENOUS at 09:00

## 2024-06-26 RX ADMIN — POLYVINYL ALCOHOL 1 DROP: 1.4 SOLUTION/ DROPS OPHTHALMIC at 12:10

## 2024-06-26 RX ADMIN — Medication: at 11:29

## 2024-06-26 RX ADMIN — Medication: at 11:27

## 2024-06-26 ASSESSMENT — PULMONARY FUNCTION TESTS
PIF_VALUE: 12
PIF_VALUE: 19
PIF_VALUE: 18
PIF_VALUE: 22
PIF_VALUE: 21
PIF_VALUE: 20
PIF_VALUE: 18
PIF_VALUE: 21
PIF_VALUE: 20
PIF_VALUE: 23
PIF_VALUE: 19
PIF_VALUE: 25
PIF_VALUE: 21
PIF_VALUE: 19
PIF_VALUE: 18
PIF_VALUE: 18
PIF_VALUE: 19
PIF_VALUE: 22
PIF_VALUE: 21
PIF_VALUE: 20
PIF_VALUE: 24
PIF_VALUE: 19
PIF_VALUE: 20
PIF_VALUE: 11
PIF_VALUE: 19
PIF_VALUE: 20
PIF_VALUE: 12
PIF_VALUE: 17
PIF_VALUE: 12
PIF_VALUE: 21
PIF_VALUE: 18
PIF_VALUE: 26
PIF_VALUE: 12
PIF_VALUE: 10
PIF_VALUE: 13
PIF_VALUE: 12
PIF_VALUE: 22
PIF_VALUE: 19
PIF_VALUE: 17
PIF_VALUE: 21
PIF_VALUE: 13
PIF_VALUE: 19
PIF_VALUE: 19
PIF_VALUE: 22
PIF_VALUE: 20
PIF_VALUE: 19
PIF_VALUE: 22
PIF_VALUE: 20
PIF_VALUE: 13
PIF_VALUE: 22
PIF_VALUE: 17
PIF_VALUE: 18
PIF_VALUE: 25
PIF_VALUE: 20

## 2024-06-26 ASSESSMENT — PAIN DESCRIPTION - LOCATION: LOCATION: FOOT;LEG

## 2024-06-26 ASSESSMENT — PAIN DESCRIPTION - DESCRIPTORS: DESCRIPTORS: PATIENT UNABLE TO DESCRIBE

## 2024-06-26 ASSESSMENT — PAIN DESCRIPTION - ORIENTATION: ORIENTATION: RIGHT

## 2024-06-26 ASSESSMENT — PAIN SCALES - GENERAL
PAINLEVEL_OUTOF10: 0
PAINLEVEL_OUTOF10: 2

## 2024-06-26 NOTE — PROGRESS NOTES
Plastic Surgery   Daily Progress Note  Patient: Marcela Colby      CC: concern for SJS    SUBJECTIVE:   Stable mental status.     ROS:   A 14 point review of systems was conducted, significant findings as noted above. All other systems negative.    OBJECTIVE:    PHYSICAL EXAM:    Vitals:    06/26/24 0515 06/26/24 0530 06/26/24 0545 06/26/24 0600   BP:       Pulse: 59 59 76 62   Resp: 22 22 24 22   Temp:       TempSrc:       SpO2: 98% 96% 96% 100%   Weight:       Height:           General appearance: intubated, sedated  Eyes: No scleral icterus, EOM grossly intact  Neck: Trachea midline, no JVD  Chest/Lungs:  on ventilator  Cardiovascular: RRR  Skin: Areas of bullae and sloughing epidermis across the right lower leg spreading up to the distal right thigh, also with desquamated areas with non-blanchable underlying soft tissue changes; pictures below  Extremities: chronic lymphedema bilaterally   Neuro: Unable to assess    LABS:   Recent Labs     06/24/24  0324 06/25/24  0537   WBC 7.0 13.4*   HGB 8.6* 8.3*   HCT 25.1* 24.8*   MCV 94.0 94.4   PLT 73* 64*          Recent Labs     06/24/24  1842 06/25/24  0536 06/25/24  1750    136 135*   K 4.1 4.0 4.0    103 102   CO2 23 21 20*   PHOS 1.4* 2.0*  --    BUN 10 9 10   CREATININE 1.2 1.2 1.2          Recent Labs     06/25/24  0536 06/25/24  1750   * 95*   * 417*   BILITOT 1.0 1.0   ALKPHOS 114 107        No results for input(s): \"LIPASE\", \"AMYLASE\" in the last 72 hours.     No results for input(s): \"PROT\", \"INR\", \"APTT\" in the last 72 hours.     No results for input(s): \"CKTOTAL\", \"CKMB\", \"CKMBINDEX\", \"TROPONINI\" in the last 72 hours.      ASSESSMENT & PLAN:   This is a 64 y.o. female with  history of CKD, DVTs, HLD, HTN, thyroid disease, vascular dementia.  Contacted by vascular team due to concern for developing SJS/TENS.  Lesions are necrolytic, sloughing, and extensive across the right lower extremity.  No appreciated mucosal involvement

## 2024-06-26 NOTE — PROGRESS NOTES
[facility] -  Clinical Pharmacy Note    Vancomycin - Management by Pharmacy    Consult Date(s): 6/21/24  Consulting Provider(s):  Dr. Hammer    Assessment / Plan  PNA, potential RLE cellulitis vs SJS - Vancomycin  Concurrent Antimicrobials: Zosyn - day #6  Day of Vanc Therapy / Ordered Duration: #6  Current Dosing Method: Intermittent Dosing by Levels  Therapeutic Goal: Trough ~ 15 mg/L  Current Dose / Plan:   Hx CKD stage IV; baseline SCr ~4.7 per Nephro.  Pt currently on CRRT.  Will continue to dose vancomycin intermittently based on levels  Level today = 15.8 mg/:  Will re-dose with 1500mg IV x1 today   Plan to obtain a random level 6/27 AM and reassess  Will continue to monitor clinical condition and make adjustments to regimen as appropriate    Thank you for consulting pharmacy.    Milvia FloydD., BCPS   6/26/2024 6:22 AM  Wireless: 2-2030        Interval update:  Patient remains intubated and sedated this morning with fentanyl and dexmedetomidine.  Responsive and nodding to questions yesterday.  Vasopressor support continues to lessen, and CRRT pulling small amt of fluid off.  S/p punch bx yesterday.  R ankle to thigh still necrolytic with sloughing, no mucosal involvement per notes.  Continuing vancomycin for skin issues.      Subjective/Objective: Marcela Colby is a 64 y.o. female with a PMHx significant for HLD, HTN, venous thrombosis and embolism, chronic lymphedema, hypothyroidism, Vascular dementia, CKD stage-V.  Presented from NH with SOB and lower leg pain/swelling.  PEA cardiac arrest in ED and achieved ROSC after several rounds of CPR. Admitted 6/21 to ICU with aspiration pneumonia vs LE cellulitis, septic shock, possible PE.     Pharmacy is consulted to dose vancomycin.    Ht Readings from Last 1 Encounters:   06/22/24 1.6 m (5' 3\")     Wt Readings from Last 1 Encounters:   06/26/24 (!) 141 kg (310 lb 13.6 oz)     Current & Prior Antimicrobial Regimen(s):   (6/21)  Zosyn

## 2024-06-26 NOTE — PLAN OF CARE
Progressing  Flowsheets  Taken 6/25/2024 2200 by Melchor Palafox RN  Remains free of injury from restraints (restraint for interference with medical device): Determine that other, less restrictive measures have been tried or would not be effective before applying the restraint  Taken 6/25/2024 2000 by Melchor Palafox RN  Remains free of injury from restraints (restraint for interference with medical device): Determine that other, less restrictive measures have been tried or would not be effective before applying the restraint  Taken 6/25/2024 1800 by Cheryl Rose RN  Remains free of injury from restraints (restraint for interference with medical device):   Determine that other, less restrictive measures have been tried or would not be effective before applying the restraint   Inform patient/family regarding the reason for restraint   Evaluate the patient's condition at the time of restraint application   Every 2 hours: Monitor safety, psychosocial status, comfort, nutrition and hydration  Taken 6/25/2024 1600 by Cheryl Rose RN  Remains free of injury from restraints (restraint for interference with medical device):   Determine that other, less restrictive measures have been tried or would not be effective before applying the restraint   Inform patient/family regarding the reason for restraint   Evaluate the patient's condition at the time of restraint application   Every 2 hours: Monitor safety, psychosocial status, comfort, nutrition and hydration  Taken 6/25/2024 1400 by Cheryl Rose RN  Remains free of injury from restraints (restraint for interference with medical device):   Determine that other, less restrictive measures have been tried or would not be effective before applying the restraint   Inform patient/family regarding the reason for restraint   Evaluate the patient's condition at the time of restraint application   Every 2 hours: Monitor safety, psychosocial status, comfort, nutrition and  1041 by Cheryl Rose RN  Outcome: Progressing     Problem: ABCDS Injury Assessment  Goal: Absence of physical injury  6/25/2024 2208 by Melchor Palafox RN  Outcome: Progressing  Flowsheets (Taken 6/25/2024 0655 by Melina Ricketts RN)  Absence of Physical Injury: Implement safety measures based on patient assessment  6/25/2024 1041 by Cheryl Rose RN  Outcome: Progressing  Flowsheets (Taken 6/25/2024 0655 by Melina Ricketts RN)  Absence of Physical Injury: Implement safety measures based on patient assessment     Problem: Respiratory - Adult  Goal: Achieves optimal ventilation and oxygenation  6/25/2024 2208 by Melchor Palafox RN  Outcome: Progressing  Flowsheets (Taken 6/25/2024 0800 by Cheryl Rose RN)  Achieves optimal ventilation and oxygenation:   Assess for changes in respiratory status   Assess for changes in mentation and behavior   Position to facilitate oxygenation and minimize respiratory effort   Oxygen supplementation based on oxygen saturation or arterial blood gases  6/25/2024 1041 by Cheryl Rose RN  Outcome: Progressing  Flowsheets (Taken 6/25/2024 0800)  Achieves optimal ventilation and oxygenation:   Assess for changes in respiratory status   Assess for changes in mentation and behavior   Position to facilitate oxygenation and minimize respiratory effort   Oxygen supplementation based on oxygen saturation or arterial blood gases     Problem: Cardiovascular - Adult  Goal: Maintains optimal cardiac output and hemodynamic stability  6/25/2024 2208 by Melchor Palafox RN  Outcome: Progressing  Flowsheets (Taken 6/25/2024 0800 by Cheryl Rose RN)  Maintains optimal cardiac output and hemodynamic stability: Monitor blood pressure and heart rate  6/25/2024 1041 by Cheryl Rose RN  Outcome: Progressing  Flowsheets (Taken 6/25/2024 0800)  Maintains optimal cardiac output and hemodynamic stability: Monitor blood pressure and heart rate  Goal: Absence of cardiac dysrhythmias

## 2024-06-26 NOTE — PROGRESS NOTES
Vasopressor dosing equivalent score = 14. For VDE >12 trophic TF only (10 mL/hr).    Pt remains intubated, hypotensive on x2 pressors. VDE >12, unable to safely feed pt beyond trophic (10 mL/hr). NPO x5 days, OGT in place. Plan to start trophic TF today, will monitor pressor requirements daily for ability to increase rate.    TF Orders:  Nepro @ 10 mL/hr  Flushes: 30 mL q4h    Viji Donaldson RD, LD    Tj: [phone]

## 2024-06-26 NOTE — PROGRESS NOTES
(12hrs), Av.4 °F (35.8 °C), Min:96.1 °F (35.6 °C), Max:96.7 °F (35.9 °C)      Patient Vitals for the past 8 hrs:   BP Temp Temp src Pulse Resp SpO2 Weight   24 0600 -- -- -- 62 22 100 % --   24 0545 -- -- -- 76 24 96 % --   24 0530 -- -- -- 59 22 96 % --   24 0515 -- -- -- 59 22 98 % --   24 0500 -- -- -- 61 22 98 % --   24 0445 -- -- -- 61 22 97 % --   24 0430 -- -- -- 59 22 97 % --   24 0426 -- -- -- 68 21 99 % --   24 0425 -- -- -- 68 20 99 % --   24 0415 -- -- -- 62 22 (!) 87 % --   24 0400 -- (!) 96.1 °F (35.6 °C) Temporal 60 22 97 % (!) 141 kg (310 lb 13.6 oz)   24 0345 -- -- -- 60 22 96 % --   24 0330 -- -- -- 61 21 97 % --   24 0315 -- -- -- 61 20 97 % --   24 0300 -- -- -- 62 20 (!) 89 % --   24 0245 -- -- -- 81 26 -- --   24 0230 -- -- -- 72 22 -- --   24 0215 -- -- -- 67 23 95 % --   24 0200 -- -- -- 73 23 96 % --   24 0145 -- -- -- 63 19 95 % --   24 0130 -- -- -- 63 22 95 % --   24 0115 -- -- -- 72 21 95 % --   24 0100 -- -- -- 62 17 94 % --   24 0045 -- -- -- 82 30 96 % --   24 0036 -- -- -- 64 20 90 % --   24 0034 -- -- -- 61 18 90 % --   24 0030 -- -- -- 76 25 95 % --   24 0015 -- -- -- 63 19 95 % --   24 0000 117/68 (!) 96.3 °F (35.7 °C) Temporal 64 18 95 % --   24 2345 -- -- -- 63 19 96 % --         Intake/Output Summary (Last 24 hours) at 2024 0733  Last data filed at 2024 0700  Gross per 24 hour   Intake 2731.93 ml   Output 4042 ml   Net -1310.07 ml       Review of Systems   Reason unable to perform ROS: Sedated and mechanically ventilated.     Physical Exam  Constitutional:       Appearance: She is obese. She is ill-appearing.      Comments: Ill-appearing, severely obese, elderly female, sedated and ventilated   HENT:      Head: Normocephalic and atraumatic.   Eyes:      General: No scleral icterus.      to stop later today with hope for intermittent HD tomorrow  - Avoid nephrotoxins  - Renally dose medications  - MAP goal > 65  - Strict I/Os        No acute issues at this time.      Heme/Onc  Acute leukopenia; resolved  Acute leukocytosis; worsening  Suspect 2/2 sepsis, treatment for this per above    Acute thrombocytopenia  PLT 54 < 64 < 73 < 100 < 146 < 211    Chronic normocytic anemia  D/t chronic renal failure  Hgb stable compared to her baseline  - Daily CBC      ID  Septic shock  Unclear etiology, possibly CAP vs. Lower extremity cellulitis. F flu, strep pneumo and Moraxella positive. MRSA neg.   - BC ordered 06/22 but never drawn  - Continue vancomycin, Zosyn (06/21 - )      Endo/Rheum  Hypothyroidism  - Continue home Synthroid 50 mcg daily      Derm/MSK  Concern for RLE cellulitis  - Vascular surgery consulted by hospitalist for assistance  - Plastic surgery consulted by request of vascular surgery due to their concern for possible SJS/TEN  - S/p punch biopsy 06/25, results pending  - Concern for possible vasculitis per surgery, will start steroids  - Wound care   - Vanc/Zosyn      Code Status: Full Code   DVT PPx: SubQ heparin  DISPO: ICU    Emmett Shane, DO  Internal Medicine, PGY-1  Contact via [employer]  6/26/2024, 7:33 AM    This patient has been staffed and discussed with Dr. Jimenez.  Patient examined, findings as discussed with Dr. Shane.  Agree with assessment and plan.  Respiratory failure, associated with PEA cardiac arrest, is compensated with mechanical ventilation on IMV mode, minute volume 10-11 L with FiO2 40%.  Maintains normocapnia with pCO2 37, normal acid-base equilibrium with pH 7.42.  Gas exchange is adequate.  Hemodynamically improving, able to wean norepinephrine to low level.  On CRRT for LUIS on CKD, we have been taking off modest amount of fluid.  Will assess possible transition to HD.  Right leg continues to be somewhat swollen and she has large areas of epidermal  sloughing.  No clear etiology has been determined.  Skin biopsy pending.  Note she was admitted with right leg pain and swelling, without abnormalities initially.  No signs of skin involvement elsewhere.  No evidence of circulatory compromise.  Empirically treating with glucocorticoid for the possibility of an inflammatory process, after discussion with Dr Dumont  She has significant pain associated with right leg problems, does not tolerate any reduction in fentanyl.  Her mentation level remains quite impaired.  May try reducing dexmedetomidine.  Time spent in management of critical care issues 45 minutes

## 2024-06-26 NOTE — PROGRESS NOTES
Cardiology Consult Service  Daily Progress Note        Admit Date:  6/21/2024    Subjective:     Interval history:  Remains intubated, sedated    Objective:     Medications:   polyethylene glycol  17 g Per NG tube Daily    sennosides-docusate sodium  1 tablet Per NG tube BID    lidocaine-EPINEPHrine  20 mL IntraDERmal Once    epoetin shannon-epbx  10,000 Units SubCUTAneous Once per day on Tue Thu Sat    sodium chloride flush  5-40 mL IntraVENous 2 times per day    levothyroxine  50 mcg Oral Daily    [Held by provider] calcitRIOL  0.5 mcg Oral Q MWF    folic acid  1 mg Oral Daily    lansoprazole  30 mg Orogastric QAM AC    polyvinyl alcohol  1 drop Both Eyes Q4H    piperacillin-tazobactam  4,500 mg IntraVENous Q8H    sodium chloride flush  5-40 mL IntraVENous 2 times per day    heparin (porcine)  5,000 Units SubCUTAneous 3 times per day    vancomycin (VANCOCIN) intermittent dosing (placeholder)   Other RX Placeholder       IV drips:   sodium chloride      dexmedeTOMIDine (PRECEDEX) 400 mcg in sodium chloride 0.9 % 100 mL infusion 1.1 mcg/kg/hr (06/26/24 1007)    fentaNYL 125 mcg/hr (06/26/24 1009)    prismaSATE BGK 4/2.5 1,400 mL/hr at 06/26/24 0753    prismaSATE BGK 4/2.5 1,400 mL/hr at 06/26/24 0753    prismaSATE BGK 4/2.5 1,400 mL/hr at 06/26/24 0753    sodium chloride      norepinephrine 10 mcg/min (06/26/24 0656)    VASOpressin 20 Units in sodium chloride 0.9 % 100 mL infusion 0.03 Units/min (06/26/24 0635)       PRN:  sodium chloride flush, sodium chloride, ALTEplase, potassium chloride, magnesium sulfate, calcium gluconate **OR** calcium gluconate **OR** calcium gluconate **OR** calcium gluconate, sodium phosphate 6 mmol in sodium chloride 0.9 % 250 mL IVPB **OR** sodium phosphate 12 mmol in sodium chloride 0.9 % 250 mL IVPB **OR** sodium phosphate 18 mmol in sodium chloride 0.9 % 500 mL IVPB **OR** sodium phosphate 24 mmol in sodium chloride 0.9 % 500 mL IVPB, sodium chloride flush **AND** sodium chloride flush,

## 2024-06-26 NOTE — PROGRESS NOTES
Palliative Care Chart Review  and Check in Note:     NAME:  Marcela Colby  Admit Date: 6/21/2024  Hospital Day:  Hospital Day: 6   Current Code status: Full Code    Palliative care is continuing to following Ms. Colby for symptom management,  and goals of care discussion as needed. Patient's chart reviewed today 6/26/24.      Saw pt at the bedside and d/w RN Moon. Pt is withdrawing to pain, grimaced when I asked her to open her eyes but did not follow commands. She remains on precedex and fentanyl gtts due to issues with compliance on the ventilator, so true neurologic status is difficult to assess.    Called and updated pt's guardian Liza and pt's daughter Noa. At this time pt's family wants to continue with aggressive management and her guardian is honoring this.       The following are the currently established goals/code status, and Symptom management.     Goals of care: Goals are this time are life-prolonging/rehabilitative. Will offer a family meeting if/when further decisions arise and we have a more concrete idea of pt's needs in the future. I have told pt's family and guardian that she will most definitely need dialysis if they want to continue with life prolonging care and they are OK with this. Pt's guardian Liza has the final say regarding medical decision making, however she is working with the family and trying to honor their wishes as well.     Code status: Full Code    Discharge plan: Likely need placement when medically ready for discharge.       MIGUEL ÁNGEL Candelario CNP  06/26/24  9:25 AM

## 2024-06-26 NOTE — PROGRESS NOTES
[facility] Progress Note    CC: LUIS on CKD 4    64 y.o. year old female who we are seeing in consultation for acute kidney injury.  Patient has a history of chronic kidney disease stage IV.  Had PEA arrest on presentation.         Subjective/     Ongoing CRRT, tolerating  Attempting 50 ml/hr net off   Pressor requirement lower    Seen by Vascular Surgery and Plastic Surgery for RLE -   No perfusion issues  Had skin biopsy - pending    ROS: unable    No visitors    Objective/   GEN:  Chronically ill, /68   Pulse 67   Temp (!) 95.8 °F (35.4 °C) (Axillary) Comment: warming blanket applied  Resp 24   Ht 1.6 m (5' 3\")   Wt (!) 141 kg (310 lb 13.6 oz)   LMP  (LMP Unknown) Comment: unsure  SpO2 95%   BMI 55.06 kg/m²     GEN: morbidly obese, on vent, critically ill  HEENT: non-icteric, ETT in place  NECK: no JVD  CV: S1, S2 without m/r/g  RESP: diminished BS bibasilar  ABD: Soft, NT  SKIN: warm,   EXT: peripheral and dependent edema LLE and back; RLE with Desquamation lesions RLE  NEURO: sedated    Data/  CBC:   Recent Labs     06/24/24  0324 06/25/24  0537 06/26/24  0554   WBC 7.0 13.4* 20.0*   RBC 2.67* 2.63* 2.55*   HGB 8.6* 8.3* 7.9*   HCT 25.1* 24.8* 23.9*   PLT 73* 64* 54*       BMP:    Recent Labs     06/25/24  0536 06/25/24  1750 06/26/24  0554    135* 135*   K 4.0 4.0 3.9    102 102   CO2 21 20* 21   BUN 9 10 9   CREATININE 1.2 1.2 1.0   CALCIUM 8.2* 8.6 8.5   GLUCOSE 84 89 92       Phosphorus:    Recent Labs     06/24/24  0324 06/24/24  1842 06/25/24  0536   PHOS 2.0* 1.4* 2.0*       Magnesium:    Recent Labs     06/25/24  0536 06/25/24  1750 06/26/24  0554   MG 2.40 2.20 2.30       Hepatic Function Panel:    Lab Results   Component Value Date/Time    ALKPHOS 136 06/26/2024 05:54 AM     06/26/2024 05:54 AM    AST 65 06/26/2024 05:54 AM    PROT 7.9 06/15/2017 12:12 PM    BILITOT 1.2 06/26/2024 05:54 AM    BILIDIR 0.4 06/22/2024 12:38 PM    IBILI 0.2

## 2024-06-26 NOTE — PROGRESS NOTES
Patient has been continuously coughing, gagging and generally not tolerating the ETT since trying to lighten sedation. Patient is not following commands, but does get increasingly agitated with turns and any attempt to inspect/touch her right leg. Fentanyl infusion back up to 150 mcg/min in an attempt to make patient more comfortable.

## 2024-06-26 NOTE — CARE COORDINATION
Case Management Assessment           Daily Note                 Date/ Time of Note: 6/26/2024 9:39 AM         Note completed by: Debby Isabel RN    Patient Name: Marcela Colby  YOB: 1960    Diagnosis:Cardiac arrest (HCC) [I46.9]  Septicemia (HCC) [A41.9]  Cellulitis of right lower extremity [L03.115]  Acute renal failure, unspecified acute renal failure type (HCC) [N17.9]  Patient Admission Status: Inpatient  Date of Admission:6/21/2024  4:12 PM    Length of Stay: 5 GLOS: GMLOS: 5.1 Readmission Risk Score: Readmission Risk Score: 17.6    Current Plan of Care: Intubated and sedated. CRRT Vasopressin gtt IV ABX. Vascular consulted. Wound care.    PT AM-PAC:   / 24 per last evaluation on: will need to be assessed    OT AM-PAC:   / 24 per last evaluation on: will need to be assessed    Discharge Plan: tbd- possibly return to long-term care facility.    Pre-cert required for SNF: YES If returning long-term care, no pre-cert needed per Kelley in admissions    COVID Result:  No results found for: \"COVID19\"    Transportation PLAN for discharge: EMS transportation    Case Management Notes: Long term care resident at [facility] and her family were provided with choice of provider; she and her family are in agreement with the discharge plan.    Emergency Contacts:  Extended Emergency Contact Information  Primary Emergency Contact: Liza Quiles  Home Phone: [phone]  Relation: Legal Guardian  Secondary Emergency Contact: Kristi Colby  Home Phone: [phone]  Relation: Child    Care Transition Patient: No      Debby Isabel RN  [facility]  Case Management Department  [phone]

## 2024-06-26 NOTE — PROGRESS NOTES
V2.0    [facility] Progress Note      Name:  Marcela Colby /Age/Sex: 1960  (64 y.o. female)   MRN & CSN:  [id number] & [id number] Encounter Date/Time: 2024 9:13 AM EDT   Location:  [facility]4/4514[facility]01 PCP: Thalia Au MD     Attending:Abhishek Ham MD       Hospital Day: 6    Assessment and Recommendations   Marcela Colby is a 64 y.o. female with pmh of CKD stage V, hyperlipidemia, hypertension, dementia, pancytopenia, hypothyroidism who presents with Cardiac arrest (HCC).  Patient was sent for right lower leg pain and swelling.  Had a Doppler which was negative.  Patient came back to the ED with dyspnea shortness of breath.  Patient was placed on BiPAP and was febrile.  Patient then went into PEA cardiac arrest.  RCS was after 20 minutes.  Patient was intubated on admission.  She has septic shock secondary to pneumonia with volume overload LUIS on CKD.  Started on CRRT and pressors.  Pneumonia panel positive for Moraxella H influenza.  Urine for strep pneumonia positive      Plan:     Cardiac arrest  Concern for anoxic brain injury  -PEA cardiac arrest, cause of arrest unclear  -reported ~20 minutes until ROSC  -wean sedation as able    Acute respiratory Failure with hypoxia  Acute Pneumonia  - Pulm/Crit following, Vent management per their recs  - Continue Vanc + Zosyn, day 6    Shock, suspect septic +/- cardiogenic etiologies  Continue pressors as needed for goal MAP ~65    ARF superimposed on CKD V  Requiring RRT  Nephrology consulted, CRRT per their recs    Concern for RLE cellulitis  - Vascular surgery consulted by hospitalist for assistance  - Plastic surgery consulted by request of vascular surgery due to their concern for possible SJS/TEN  - Plastic surgery performed punch biopsy, a/w dermatopathology  - Wound care   - continue abx    Other issue    Hypertension  -Patient takes labetalol 100 mg twice a day, amlodipine 10 mg daily and torsemide daily.  At present of medications    -History empyema of  was used for this procedure, which may include, but is not limited to, dose reduction technique, automated exposure control, iterative reconstruction, ALARA (As Low As Reasonably Achievable), or Image Gently techniques. FINDINGS: There is patient motion during evaluation is limiting. Diffuse skin thickening and soft tissue swelling is seen beginning in the mid thigh and extending through the catheter. No discrete abscess is seen. No focal masses. There is patient motion during the examination and fractures of the distal fibula cannot findings are likely related to motion in this region correlate clinically. No evidence of any gas within the soft tissues.     1.   Limited exam patient motion. 2.  Diffuse soft tissue swelling. Electronically signed by Arielle Menon    XR CHEST PORTABLE    Result Date: 6/21/2024  EXAM: XR CHEST PORTABLE INDICATION: altered mental status COMPARISON: CT dated 6/18/2020 FINDINGS: Medical Devices: None. Lungs: Bilateral hilar opacities Pleura: No pneumothorax or pleural effusion. Heart and Mediastinum: The cardiomediastinal silhouette is within normal limits. Bones: No acute suspicious abnormality.     Bilateral infrahilar atelectasis or pneumonia. Electronically signed by Zenon High    Vascular duplex lower extremity venous right    Result Date: 6/21/2024    No evidence of deep vein and superficial thrombosis in the right lower extremity.     XR TIBIA FIBULA RIGHT (2 VIEWS)    Result Date: 6/21/2024  EXAM: XR TIBIA FIBULA RIGHT (2 VIEWS). DATE: 6/21/2024 5:36 AM. INDICATION: leg pain COMPARISON: None TECHNIQUE: Standard per department protocol. VIEWS OBTAINED: 4 FINDINGS: No acute fracture or dislocation/subluxation. Moderate osteoarthrosis of the knee. The soft tissues are normal.     No acute osseous abnormality. Electronically signed by Jeremy Monroe    XR FOOT RIGHT (MIN 3 VIEWS)    Result Date: 6/21/2024  EXAM: XR FOOT RIGHT (MIN 3 VIEWS). DATE: 6/21/2024 5:36 AM.

## 2024-06-27 ENCOUNTER — APPOINTMENT (OUTPATIENT)
Dept: GENERAL RADIOLOGY | Age: 64
DRG: 870 | End: 2024-06-27
Payer: MEDICARE

## 2024-06-27 LAB
ALBUMIN SERPL-MCNC: 2.3 G/DL (ref 3.4–5)
ALBUMIN/GLOB SERPL: 0.6 {RATIO} (ref 1.1–2.2)
ALP SERPL-CCNC: 205 U/L (ref 40–129)
ALT SERPL-CCNC: 228 U/L (ref 10–40)
ANA SER QL IA: NEGATIVE
ANION GAP SERPL CALCULATED.3IONS-SCNC: 13 MMOL/L (ref 3–16)
ANISOCYTOSIS BLD QL SMEAR: ABNORMAL
AST SERPL-CCNC: 37 U/L (ref 15–37)
BASE EXCESS BLDA CALC-SCNC: -0.5 MMOL/L (ref -3–3)
BASE EXCESS BLDA CALC-SCNC: 0 MMOL/L (ref -3–3)
BASOPHILS # BLD: 0 K/UL (ref 0–0.2)
BASOPHILS NFR BLD: 0 %
BILIRUB SERPL-MCNC: 1 MG/DL (ref 0–1)
BUN SERPL-MCNC: 19 MG/DL (ref 7–20)
C3 SERPL-MCNC: 86.1 MG/DL (ref 90–180)
C4 SERPL-MCNC: 36 MG/DL (ref 10–40)
CA-I BLD-SCNC: 1.11 MMOL/L (ref 1.12–1.32)
CA-I BLD-SCNC: 1.19 MMOL/L (ref 1.12–1.32)
CALCIUM SERPL-MCNC: 8.9 MG/DL (ref 8.3–10.6)
CHLORIDE SERPL-SCNC: 102 MMOL/L (ref 99–110)
CO2 BLDA-SCNC: 25 MMOL/L
CO2 BLDA-SCNC: 26 MMOL/L
CO2 SERPL-SCNC: 21 MMOL/L (ref 21–32)
COHGB MFR BLDA: 1.1 % (ref 0–1.5)
CREAT SERPL-MCNC: 1.7 MG/DL (ref 0.6–1.2)
DACRYOCYTES BLD QL SMEAR: ABNORMAL
DEPRECATED RDW RBC AUTO: 15.8 % (ref 12.4–15.4)
EOSINOPHIL # BLD: 0 K/UL (ref 0–0.6)
EOSINOPHIL NFR BLD: 0 %
GFR SERPLBLD CREATININE-BSD FMLA CKD-EPI: 33 ML/MIN/{1.73_M2}
GLUCOSE BLD-MCNC: 106 MG/DL (ref 70–99)
GLUCOSE BLD-MCNC: 127 MG/DL (ref 70–99)
GLUCOSE BLD-MCNC: 149 MG/DL (ref 70–99)
GLUCOSE SERPL-MCNC: 135 MG/DL (ref 70–99)
HCO3 BLDA-SCNC: 24 MMOL/L (ref 21–29)
HCO3 BLDA-SCNC: 24.4 MMOL/L (ref 21–29)
HCT VFR BLD AUTO: 25.8 % (ref 36–48)
HGB BLD-MCNC: 8.3 G/DL (ref 12–16)
HGB BLDA-MCNC: 19.1 G/DL
INR PPP: 1.27 (ref 0.85–1.15)
LYMPHOCYTES # BLD: 1.3 K/UL (ref 1–5.1)
LYMPHOCYTES NFR BLD: 4 %
MAGNESIUM SERPL-MCNC: 2.4 MG/DL (ref 1.8–2.4)
MCH RBC QN AUTO: 30 PG (ref 26–34)
MCHC RBC AUTO-ENTMCNC: 32.3 G/DL (ref 31–36)
MCV RBC AUTO: 92.8 FL (ref 80–100)
METHGB MFR BLDA: <0 % (ref 0–1.4)
MICROCYTES BLD QL SMEAR: ABNORMAL
MONOCYTES # BLD: 0 K/UL (ref 0–1.3)
MONOCYTES NFR BLD: 0 %
NEUTROPHILS # BLD: 30.9 K/UL (ref 1.7–7.7)
NEUTROPHILS NFR BLD: 96 %
PCO2 BLDA: 35.5 MM HG (ref 35–45)
PCO2 BLDA: 37.7 MMHG (ref 35–45)
PERFORMED ON: ABNORMAL
PH BLDA: 7.41 [PH] (ref 7.35–7.45)
PH BLDA: 7.45 [PH] (ref 7.35–7.45)
PH VENOUS: 7.42 (ref 7.35–7.45)
PH VENOUS: 7.42 (ref 7.35–7.45)
PHOSPHATE SERPL-MCNC: 2.6 MG/DL (ref 2.5–4.9)
PHOSPHATE SERPL-MCNC: 2.6 MG/DL (ref 2.5–4.9)
PLATELET # BLD AUTO: 58 K/UL (ref 135–450)
PLATELET BLD QL SMEAR: ABNORMAL
PMV BLD AUTO: 9.9 FL (ref 5–10.5)
PO2 BLDA: 76.1 MM HG (ref 75–108)
PO2 BLDA: 76.6 MMHG (ref 75–108)
POC SAMPLE TYPE: ABNORMAL
POTASSIUM SERPL-SCNC: 4 MMOL/L (ref 3.5–5.1)
PROT SERPL-MCNC: 6.1 G/DL (ref 6.4–8.2)
PROTHROMBIN TIME: 16.1 SEC (ref 11.9–14.9)
RBC # BLD AUTO: 2.78 M/UL (ref 4–5.2)
SAO2 % BLDA: 96 % (ref 93–100)
SAO2 % BLDA: 96 % (ref 93–100)
SODIUM SERPL-SCNC: 136 MMOL/L (ref 136–145)
TRIGL SERPL-MCNC: 470 MG/DL (ref 0–150)
VANCOMYCIN SERPL-MCNC: 22.6 UG/ML
WBC # BLD AUTO: 32.2 K/UL (ref 4–11)

## 2024-06-27 PROCEDURE — 2500000003 HC RX 250 WO HCPCS

## 2024-06-27 PROCEDURE — 6360000002 HC RX W HCPCS

## 2024-06-27 PROCEDURE — 2000000000 HC ICU R&B

## 2024-06-27 PROCEDURE — 85025 COMPLETE CBC W/AUTO DIFF WBC: CPT

## 2024-06-27 PROCEDURE — 99232 SBSQ HOSP IP/OBS MODERATE 35: CPT | Performed by: INTERNAL MEDICINE

## 2024-06-27 PROCEDURE — 82330 ASSAY OF CALCIUM: CPT

## 2024-06-27 PROCEDURE — 82803 BLOOD GASES ANY COMBINATION: CPT

## 2024-06-27 PROCEDURE — 6370000000 HC RX 637 (ALT 250 FOR IP)

## 2024-06-27 PROCEDURE — 6370000000 HC RX 637 (ALT 250 FOR IP): Performed by: STUDENT IN AN ORGANIZED HEALTH CARE EDUCATION/TRAINING PROGRAM

## 2024-06-27 PROCEDURE — 2500000003 HC RX 250 WO HCPCS: Performed by: INTERNAL MEDICINE

## 2024-06-27 PROCEDURE — 87205 SMEAR GRAM STAIN: CPT

## 2024-06-27 PROCEDURE — 80202 ASSAY OF VANCOMYCIN: CPT

## 2024-06-27 PROCEDURE — 74018 RADEX ABDOMEN 1 VIEW: CPT

## 2024-06-27 PROCEDURE — 87075 CULTR BACTERIA EXCEPT BLOOD: CPT

## 2024-06-27 PROCEDURE — 83735 ASSAY OF MAGNESIUM: CPT

## 2024-06-27 PROCEDURE — 99291 CRITICAL CARE FIRST HOUR: CPT | Performed by: INTERNAL MEDICINE

## 2024-06-27 PROCEDURE — 82947 ASSAY GLUCOSE BLOOD QUANT: CPT

## 2024-06-27 PROCEDURE — 2580000003 HC RX 258

## 2024-06-27 PROCEDURE — 84100 ASSAY OF PHOSPHORUS: CPT

## 2024-06-27 PROCEDURE — 6360000002 HC RX W HCPCS: Performed by: INTERNAL MEDICINE

## 2024-06-27 PROCEDURE — 80053 COMPREHEN METABOLIC PANEL: CPT

## 2024-06-27 PROCEDURE — 87070 CULTURE OTHR SPECIMN AEROBIC: CPT

## 2024-06-27 PROCEDURE — 85610 PROTHROMBIN TIME: CPT

## 2024-06-27 PROCEDURE — 36415 COLL VENOUS BLD VENIPUNCTURE: CPT

## 2024-06-27 PROCEDURE — 99232 SBSQ HOSP IP/OBS MODERATE 35: CPT | Performed by: SURGERY

## 2024-06-27 PROCEDURE — 94761 N-INVAS EAR/PLS OXIMETRY MLT: CPT

## 2024-06-27 PROCEDURE — 94003 VENT MGMT INPAT SUBQ DAY: CPT

## 2024-06-27 PROCEDURE — 2700000000 HC OXYGEN THERAPY PER DAY

## 2024-06-27 RX ORDER — OXYCODONE HYDROCHLORIDE 15 MG/1
15 TABLET ORAL EVERY 6 HOURS
Status: DISCONTINUED | OUTPATIENT
Start: 2024-06-27 | End: 2024-06-29

## 2024-06-27 RX ORDER — CASTOR OIL AND BALSAM, PERU 788; 87 MG/G; MG/G
OINTMENT TOPICAL 2 TIMES DAILY
Status: DISCONTINUED | OUTPATIENT
Start: 2024-06-27 | End: 2024-06-30 | Stop reason: HOSPADM

## 2024-06-27 RX ORDER — METHOCARBAMOL 750 MG/1
750 TABLET, FILM COATED ORAL ONCE
Status: COMPLETED | OUTPATIENT
Start: 2024-06-27 | End: 2024-06-27

## 2024-06-27 RX ORDER — POLYETHYLENE GLYCOL 3350 17 G/17G
17 POWDER, FOR SOLUTION ORAL 2 TIMES DAILY
Status: DISCONTINUED | OUTPATIENT
Start: 2024-06-27 | End: 2024-06-30 | Stop reason: HOSPADM

## 2024-06-27 RX ORDER — SENNA AND DOCUSATE SODIUM 50; 8.6 MG/1; MG/1
2 TABLET, FILM COATED ORAL 2 TIMES DAILY
Status: DISCONTINUED | OUTPATIENT
Start: 2024-06-27 | End: 2024-06-30 | Stop reason: HOSPADM

## 2024-06-27 RX ORDER — NALOXONE HYDROCHLORIDE 0.4 MG/ML
0.4 INJECTION, SOLUTION INTRAMUSCULAR; INTRAVENOUS; SUBCUTANEOUS PRN
Status: DISCONTINUED | OUTPATIENT
Start: 2024-06-27 | End: 2024-06-30 | Stop reason: HOSPADM

## 2024-06-27 RX ADMIN — POLYVINYL ALCOHOL 1 DROP: 1.4 SOLUTION/ DROPS OPHTHALMIC at 04:00

## 2024-06-27 RX ADMIN — POLYVINYL ALCOHOL 1 DROP: 1.4 SOLUTION/ DROPS OPHTHALMIC at 12:05

## 2024-06-27 RX ADMIN — Medication 175 MCG/HR: at 02:07

## 2024-06-27 RX ADMIN — POLYETHYLENE GLYCOL 3350 17 G: 17 POWDER, FOR SOLUTION ORAL at 21:00

## 2024-06-27 RX ADMIN — FOLIC ACID 1 MG: 1 TABLET ORAL at 08:28

## 2024-06-27 RX ADMIN — HYDROMORPHONE HYDROCHLORIDE 2 MG: 1 INJECTION, SOLUTION INTRAMUSCULAR; INTRAVENOUS; SUBCUTANEOUS at 19:06

## 2024-06-27 RX ADMIN — HEPARIN SODIUM 5000 UNITS: 5000 INJECTION INTRAVENOUS; SUBCUTANEOUS at 17:27

## 2024-06-27 RX ADMIN — HYDROMORPHONE HYDROCHLORIDE 1 MG: 1 INJECTION, SOLUTION INTRAMUSCULAR; INTRAVENOUS; SUBCUTANEOUS at 23:17

## 2024-06-27 RX ADMIN — DEXMEDETOMIDINE 1.1 MCG/KG/HR: 200 INJECTION, SOLUTION INTRAVENOUS at 01:01

## 2024-06-27 RX ADMIN — PIPERACILLIN AND TAZOBACTAM 4500 MG: 4; .5 INJECTION, POWDER, LYOPHILIZED, FOR SOLUTION INTRAVENOUS at 05:53

## 2024-06-27 RX ADMIN — CALCIUM GLUCONATE 1000 MG: 10 INJECTION, SOLUTION INTRAVENOUS at 04:51

## 2024-06-27 RX ADMIN — DOCUSATE SODIUM 50 MG AND SENNOSIDES 8.6 MG 2 TABLET: 8.6; 5 TABLET, FILM COATED ORAL at 21:01

## 2024-06-27 RX ADMIN — POLYVINYL ALCOHOL 1 DROP: 1.4 SOLUTION/ DROPS OPHTHALMIC at 08:23

## 2024-06-27 RX ADMIN — NOREPINEPHRINE BITARTRATE 6 MCG/MIN: 1 INJECTION, SOLUTION, CONCENTRATE INTRAVENOUS at 02:23

## 2024-06-27 RX ADMIN — SODIUM CHLORIDE, PRESERVATIVE FREE 10 ML: 5 INJECTION INTRAVENOUS at 08:24

## 2024-06-27 RX ADMIN — HYDROMORPHONE HYDROCHLORIDE 0.5 MG: 1 INJECTION, SOLUTION INTRAMUSCULAR; INTRAVENOUS; SUBCUTANEOUS at 18:10

## 2024-06-27 RX ADMIN — DOCUSATE SODIUM 50MG AND SENNOSIDES 8.6MG 1 TABLET: 8.6; 5 TABLET, FILM COATED ORAL at 08:28

## 2024-06-27 RX ADMIN — HEPARIN SODIUM 5000 UNITS: 5000 INJECTION INTRAVENOUS; SUBCUTANEOUS at 22:00

## 2024-06-27 RX ADMIN — EPOETIN ALFA-EPBX 10000 UNITS: 10000 INJECTION, SOLUTION INTRAVENOUS; SUBCUTANEOUS at 17:30

## 2024-06-27 RX ADMIN — METHOCARBAMOL 750 MG: 750 TABLET ORAL at 20:59

## 2024-06-27 RX ADMIN — POLYVINYL ALCOHOL 1 DROP: 1.4 SOLUTION/ DROPS OPHTHALMIC at 21:00

## 2024-06-27 RX ADMIN — DEXMEDETOMIDINE 1.1 MCG/KG/HR: 200 INJECTION, SOLUTION INTRAVENOUS at 03:14

## 2024-06-27 RX ADMIN — Medication: at 20:59

## 2024-06-27 RX ADMIN — POLYETHYLENE GLYCOL 3350 17 G: 17 POWDER, FOR SOLUTION ORAL at 08:28

## 2024-06-27 RX ADMIN — DEXMEDETOMIDINE 1.1 MCG/KG/HR: 200 INJECTION, SOLUTION INTRAVENOUS at 05:55

## 2024-06-27 RX ADMIN — Medication 200 MCG/HR: at 06:23

## 2024-06-27 RX ADMIN — OXYCODONE 15 MG: 15 TABLET ORAL at 12:05

## 2024-06-27 RX ADMIN — WATER 60 MG: 1 INJECTION INTRAMUSCULAR; INTRAVENOUS; SUBCUTANEOUS at 04:53

## 2024-06-27 RX ADMIN — LANSOPRAZOLE 30 MG: 30 TABLET, ORALLY DISINTEGRATING, DELAYED RELEASE ORAL at 06:38

## 2024-06-27 RX ADMIN — OXYCODONE 15 MG: 15 TABLET ORAL at 17:19

## 2024-06-27 RX ADMIN — HEPARIN SODIUM 5000 UNITS: 5000 INJECTION INTRAVENOUS; SUBCUTANEOUS at 05:31

## 2024-06-27 RX ADMIN — LEVOTHYROXINE SODIUM 50 MCG: 50 TABLET ORAL at 06:38

## 2024-06-27 ASSESSMENT — PULMONARY FUNCTION TESTS
PIF_VALUE: 12
PIF_VALUE: 19
PIF_VALUE: 17
PIF_VALUE: 17
PIF_VALUE: 16
PIF_VALUE: 13
PIF_VALUE: 18
PIF_VALUE: 16
PIF_VALUE: 17
PIF_VALUE: 16
PIF_VALUE: 19
PIF_VALUE: 18
PIF_VALUE: 12
PIF_VALUE: 15
PIF_VALUE: 16
PIF_VALUE: 17
PIF_VALUE: 16
PIF_VALUE: 18
PIF_VALUE: 16

## 2024-06-27 ASSESSMENT — PAIN SCALES - GENERAL
PAINLEVEL_OUTOF10: 5
PAINLEVEL_OUTOF10: 10
PAINLEVEL_OUTOF10: 6
PAINLEVEL_OUTOF10: 10
PAINLEVEL_OUTOF10: 6
PAINLEVEL_OUTOF10: 7
PAINLEVEL_OUTOF10: 10
PAINLEVEL_OUTOF10: 10
PAINLEVEL_OUTOF10: 5
PAINLEVEL_OUTOF10: 8

## 2024-06-27 ASSESSMENT — PAIN DESCRIPTION - LOCATION
LOCATION: GENERALIZED
LOCATION: GENERALIZED

## 2024-06-27 NOTE — PROGRESS NOTES
Cardiology Consult Service  Daily Progress Note        Admit Date:  6/21/2024    Subjective:     Interval history:  Extubated    Objective:     Medications:   polyethylene glycol  17 g Per NG tube BID    sennosides-docusate sodium  2 tablet Per NG tube BID    oxyCODONE  15 mg Oral Q6H    balsum peru-castor oil   Topical BID    insulin lispro  0-4 Units SubCUTAneous Q6H    lidocaine-EPINEPHrine  20 mL IntraDERmal Once    epoetin shannon-epbx  10,000 Units SubCUTAneous Once per day on Tue Thu Sat    sodium chloride flush  5-40 mL IntraVENous 2 times per day    levothyroxine  50 mcg Oral Daily    [Held by provider] calcitRIOL  0.5 mcg Oral Q MWF    folic acid  1 mg Oral Daily    lansoprazole  30 mg Orogastric QAM AC    polyvinyl alcohol  1 drop Both Eyes Q4H    sodium chloride flush  5-40 mL IntraVENous 2 times per day    heparin (porcine)  5,000 Units SubCUTAneous 3 times per day       IV drips:   dextrose      sodium chloride      dexmedeTOMIDine (PRECEDEX) 400 mcg in sodium chloride 0.9 % 100 mL infusion Stopped (06/27/24 0824)    sodium chloride      norepinephrine Stopped (06/27/24 0952)    VASOpressin 20 Units in sodium chloride 0.9 % 100 mL infusion Stopped (06/26/24 2119)       PRN:  HYDROmorphone, glucose, dextrose bolus **OR** dextrose bolus, glucagon (rDNA), dextrose, sodium chloride flush, sodium chloride, ALTEplase, sodium chloride flush **AND** sodium chloride flush, perflutren lipid microspheres, sodium chloride flush, sodium chloride, ondansetron **OR** ondansetron, acetaminophen **OR** acetaminophen    Vitals:    06/27/24 1200 06/27/24 1258 06/27/24 1300 06/27/24 1400   BP:       Pulse: 94 94 98 99   Resp: 28 23 20 22   Temp: 98.7 °F (37.1 °C)      TempSrc: Axillary      SpO2: 97% 93% 93% 93%   Weight:       Height:           Intake/Output Summary (Last 24 hours) at 6/27/2024 1618  Last data filed at 6/27/2024 0900  Gross per 24 hour   Intake 1672 ml   Output 1335 ml   Net 337 ml     I/O last 3 completed

## 2024-06-27 NOTE — PROGRESS NOTES
Comprehensive Nutrition Assessment  Vasopressor dosing equivalent score = 3. For VDE >12 trophic TF only (10 mL/hr).    RECOMMENDATIONS:  Increase TF to goal rate if pt remains intubated  Nepro @ 20 mL/hr, increase 10 mL q4h until goal of 50 mL/hr is met  Flushes: 30 mL q4h  Increase bowel regimen (BID; senna-s, glycolax)  Nutrition Education: Education not appropriate     NUTRITION ASSESSMENT:   Nutritional summary & status: Pt remains intubated, continues on pressor support and weaning today. VDE <12, trophic TF started yesterday and plan to advance to goal if pt remains intubated today. Sedated on fentanyl, no BM this admission and bowel regimen increased. 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 CRRT stopped overnight, no plan for HD as pt has expressed she does not want dialysis, will continue renal formula.   Admission // PMH: cardiac arrest // HLD, HTN, venous thrombosis and embolism, Vascular dementia, CKD    MALNUTRITION ASSESSMENT  Context of Malnutrition: Acute Illness   Malnutrition Status: At risk for malnutrition (Comment)  Findings of the 6 clinical characteristics of malnutrition (Minimum of 2 out of 6 clinical characteristics is required to make the diagnosis of moderate or severe Protein Calorie Malnutrition based on AND/ASPEN Guidelines):  Energy Intake:  Mild decrease in energy intake (Comment)  Weight Loss:  No significant weight loss     Body Fat Loss:  No significant body fat loss     Muscle Mass Loss:  No significant muscle mass loss      NUTRITION DIAGNOSIS   Inadequate oral intake related to impaired respiratory function as evidenced by intubation    Nutrition Monitoring and Evaluation:   Food/Nutrient Intake Outcomes:  Enteral Nutrition Intake/Tolerance  Physical Signs/Symptoms Outcomes:  Biochemical Data, Nutrition Focused Physical Findings, Weight, Constipation, Hemodynamic Status     OBJECTIVE DATA: Significant to nutrition assessment  Nutrition Related Findings: BG

## 2024-06-27 NOTE — PLAN OF CARE
Problem: Safety - Adult  Goal: Free from fall injury  Outcome: Progressing     Problem: Discharge Planning  Goal: Discharge to home or other facility with appropriate resources  Outcome: Progressing  Flowsheets (Taken 6/27/2024 1600)  Discharge to home or other facility with appropriate resources:   Arrange for needed discharge resources and transportation as appropriate   Identify discharge learning needs (meds, wound care, etc)   Arrange for interpreters to assist at discharge as needed     Problem: Pain  Goal: Verbalizes/displays adequate comfort level or baseline comfort level  Outcome: Progressing  Flowsheets (Taken 6/27/2024 1600)  Verbalizes/displays adequate comfort level or baseline comfort level:   Administer analgesics based on type and severity of pain and evaluate response   Implement non-pharmacological measures as appropriate and evaluate response   Encourage patient to monitor pain and request assistance     Problem: Safety - Medical Restraint  Goal: Remains free of injury from restraints (Restraint for Interference with Medical Device)  Description: INTERVENTIONS:  1. Determine that other, less restrictive measures have been tried or would not be effective before applying the restraint  2. Evaluate the patient's condition at the time of restraint application  3. Inform patient/family regarding the reason for restraint  4. Q2H: Monitor safety, psychosocial status, comfort, nutrition and hydration  Outcome: Progressing  Flowsheets  Taken 6/27/2024 1800 by Carmen Nunez RN  Remains free of injury from restraints (restraint for interference with medical device):   Determine that other, less restrictive measures have been tried or would not be effective before applying the restraint   Evaluate the patient's condition at the time of restraint application   Inform patient/family regarding the reason for restraint    Problem: Skin/Tissue Integrity  Goal: Absence of new skin breakdown  Description:

## 2024-06-27 NOTE — PROGRESS NOTES
Palliative Care Chart Review  and Check in Note:     NAME:  Marcela Colby  Admit Date: 6/21/2024  Hospital Day:  Hospital Day: 7   Current Code status: Full Code    Palliative care is continuing to following Ms. Colby for symptom management,  and goals of care discussion as needed. Patient's chart reviewed today 6/27/24.      Saw pt at the bedside. She is restless. Currently on SBT. D/w RUPERT Mustafa at bedside.     Called pts guardian Liza Ngo and provided brief updates. She will touch base with pt's daughter Noa today as well.     Addendum: received confirmation from RUPERT Sánchez that pt will be extubated today. Updated pt's guardian Liza.       The following are the currently established goals/code status, and Symptom management.     Goals of care: Goals are this time are life-prolonging/rehabilitative. Will offer a family meeting if/when further decisions arise and we have a more concrete idea of pt's needs in the future. I have told pt's family and guardian that she will most definitely need dialysis if they want to continue with life prolonging care and they are OK with this. Pt's guardian Liza has the final say regarding medical decision making, however she is working with the family and trying to honor their wishes as well.        Code status: Full Code       Discharge plan: Pt is a long term care resident at [facility]. Suspect she will need placement that can accommodate her dialysis when medically ready for discharge.       MIGUEL ÁNGEL Candelario CNP  06/27/24  11:13 AM

## 2024-06-27 NOTE — PROGRESS NOTES
ICU team notified of increased bp since this RN's arrival around 3:30 pm. Dr. Jimenez came to bedside to assess along with ICU residents. Pain is visibly uncontrolled, despite ordered medication (see MAR). RN notified surgical team as well. Plan of care ongoing.

## 2024-06-27 NOTE — PROGRESS NOTES
[place]2.[place]    [facility] [place]      Name:  Marcela Colby /Age/Sex: 1960  (64 y.o. female)   MRN & CSN:  [id number] & [id number] Encounter Date/Time: 2024 9:13 AM EDT   Location:  [facility]4/4514-01 PCP: Thalia Au MD     Attending:Abhishek Ham MD       Hospital Day: 7    Assessment and Recommendations   Marcela Colby is a 64 y.o. female with pmh of CKD stage V, hyperlipidemia, hypertension, dementia, pancytopenia, hypothyroidism who presents with Cardiac arrest (HCC).  Patient was sent for right lower leg pain and swelling.  Had a Doppler which was negative.  Patient came back to the ED with dyspnea shortness of breath.  Patient was placed on BiPAP and was febrile.  Patient then went into PEA cardiac arrest.  RCS was after 20 minutes.  Patient was intubated on admission.  She has septic shock secondary to pneumonia with volume overload LUIS on CKD.  Started on CRRT and pressors.  Pneumonia panel positive for Moraxella H influenza.  Urine for strep pneumonia positive      Plan:     Cardiac arrest  Concern for anoxic brain injury  -PEA cardiac arrest, cause of arrest unclear  -reported ~20 minutes until ROSC  -wean sedation as able    Acute respiratory Failure with hypoxia  Acute Pneumonia  - Pulm/Crit following, Vent management per their recs  - Completed abx course    SJS of RLE  - Vascular surgery consulted e  - Plastic surgery consulted by request of vascular surgery due to their concern for possible SJS/TEN  - Plastic surgery performed punch biopsy, dermatopathology returned and c/w SJS, unclear etiology  - Wound care     Shock, suspect septic +/- cardiogenic etiologies, improved  Weaned off pressors    ARF superimposed on CKD V, improving  Nephrology consulted, weaned off RRT, monitor renal function        Other issue    Hypertension  -Patient takes labetalol 100 mg twice a day, amlodipine 10 mg daily and torsemide daily.  At present of medications    -History empyema of lung -of thoracoscopic  REASON FOR EXAM:  Assess for gas, abscess Assess for gas, abscess COMPARISON: TECHNIQUE: Axial CT imaging is performed from the lower pelvis through the right calf with sagittal and coronal re-structures. A dose lowering technique was used for this procedure, which may include, but is not limited to, dose reduction technique, automated exposure control, iterative reconstruction, ALARA (As Low As Reasonably Achievable), or Image Gently techniques. FINDINGS: There is patient motion during evaluation is limiting. Diffuse skin thickening and soft tissue swelling is seen beginning in the mid thigh and extending through the catheter. No discrete abscess is seen. No focal masses. There is patient motion during the examination and fractures of the distal fibula cannot findings are likely related to motion in this region correlate clinically. No evidence of any gas within the soft tissues.     1.   Limited exam patient motion. 2.  Diffuse soft tissue swelling. Electronically signed by Arielle Menon    XR CHEST PORTABLE    Result Date: 6/21/2024  EXAM: XR CHEST PORTABLE INDICATION: altered mental status COMPARISON: CT dated 6/18/2020 FINDINGS: Medical Devices: None. Lungs: Bilateral hilar opacities Pleura: No pneumothorax or pleural effusion. Heart and Mediastinum: The cardiomediastinal silhouette is within normal limits. Bones: No acute suspicious abnormality.     Bilateral infrahilar atelectasis or pneumonia. Electronically signed by Zenon High    Vascular duplex lower extremity venous right    Result Date: 6/21/2024    No evidence of deep vein and superficial thrombosis in the right lower extremity.     XR TIBIA FIBULA RIGHT (2 VIEWS)    Result Date: 6/21/2024  EXAM: XR TIBIA FIBULA RIGHT (2 VIEWS). DATE: 6/21/2024 5:36 AM. INDICATION: leg pain COMPARISON: None TECHNIQUE: Standard per department protocol. VIEWS OBTAINED: 4 FINDINGS: No acute fracture or dislocation/subluxation. Moderate osteoarthrosis of the knee.  The soft tissues are normal.     No acute osseous abnormality. Electronically signed by Jeremy Monroe    XR FOOT RIGHT (MIN 3 VIEWS)    Result Date: 6/21/2024  EXAM: XR FOOT RIGHT (MIN 3 VIEWS). DATE: 6/21/2024 5:36 AM. INDICATION: foot pain COMPARISON: None TECHNIQUE: Standard per department protocol. VIEWS OBTAINED: 4 FINDINGS: No acute fracture or dislocation/subluxation. Plantar calcaneal spur. Joint spaces intact. Dorsal forefoot soft tissue swelling.     No acute osseous abnormality. Dorsal forefoot soft tissue swelling. Electronically signed by Jeremy Monroe      CBC:   Recent Labs     06/25/24  0537 06/26/24  0554 06/27/24  0603   WBC 13.4* 20.0* 32.2*   HGB 8.3* 7.9* 8.3*   PLT 64* 54* 58*     BMP:    Recent Labs     06/26/24  0554 06/26/24  1746 06/27/24  0603   * 137 136   K 3.9 4.0 4.0    102 102   CO2 21 21 21   BUN 9 10 19   CREATININE 1.0 0.9 1.7*   GLUCOSE 92 108* 135*     Hepatic:   Recent Labs     06/26/24  0554 06/26/24  1746 06/27/24  0603   AST 65* 51* 37   * 282* 228*   BILITOT 1.2* 1.4* 1.0   ALKPHOS 136* 146* 205*     Lipids:   Lab Results   Component Value Date/Time    TRIG 1,318 06/24/2024 10:41 AM     Hemoglobin A1C: No results found for: \"LABA1C\"  TSH:   Lab Results   Component Value Date/Time    TSH 1.02 06/22/2024 12:39 PM     Troponin: No results found for: \"TROPONINT\"  Lactic Acid:   No results for input(s): \"LACTA\" in the last 72 hours.    BNP:   No results for input(s): \"PROBNP\" in the last 72 hours.    UA:  Lab Results   Component Value Date/Time    NITRU Negative 06/22/2024 03:23 AM    COLORU Yellow 06/22/2024 03:23 AM    PHUR 6.0 06/22/2024 03:23 AM    WBCUA 0-2 06/22/2024 03:23 AM    RBCUA 11-20 06/22/2024 03:23 AM    MUCUS 2+ 06/22/2024 03:23 AM    BACTERIA 4+ 06/22/2024 03:23 AM    CLARITYU Clear 06/22/2024 03:23 AM    LEUKOCYTESUR Negative 06/22/2024 03:23 AM    UROBILINOGEN 0.2 06/22/2024 03:23 AM    BILIRUBINUR Negative 06/22/2024 03:23 AM

## 2024-06-27 NOTE — PROGRESS NOTES
Plastic Surgery   Daily Progress Note  Patient: Marcela Colby      CC: concern for SJS    SUBJECTIVE:   Stable mental status.     ROS:   A 14 point review of systems was conducted, significant findings as noted above. All other systems negative.    OBJECTIVE:    PHYSICAL EXAM:    Vitals:    06/27/24 0530 06/27/24 0545 06/27/24 0600 06/27/24 0615   BP:       Pulse: 57 59 63 69   Resp: 22 18 22 22   Temp:       TempSrc:       SpO2: 93% 94% 96% 93%   Weight:   (!) 139.7 kg (307 lb 15.7 oz)    Height:           General appearance: intubated, sedated  Eyes: No scleral icterus, EOM grossly intact  Neck: Trachea midline, no JVD  Chest/Lungs:  on ventilator  Cardiovascular: RRR  Skin: Areas of bullae and sloughing epidermis across the right lower leg spreading up to the distal right thigh, also with desquamated areas with non-blanchable underlying soft tissue changes  Extremities: chronic lymphedema bilaterally   Neuro: Unable to assess    LABS:   Recent Labs     06/25/24  0537 06/26/24  0554   WBC 13.4* 20.0*   HGB 8.3* 7.9*   HCT 24.8* 23.9*   MCV 94.4 93.9   PLT 64* 54*          Recent Labs     06/26/24  0554 06/26/24  1212 06/26/24  1746 06/27/24  0018   *  --  137  --    K 3.9  --  4.0  --      --  102  --    CO2 21  --  21  --    PHOS  --    < > 1.8* 2.6   BUN 9  --  10  --    CREATININE 1.0  --  0.9  --     < > = values in this interval not displayed.          Recent Labs     06/26/24  0554 06/26/24  1746   AST 65* 51*   * 282*   BILITOT 1.2* 1.4*   ALKPHOS 136* 146*        No results for input(s): \"LIPASE\", \"AMYLASE\" in the last 72 hours.     No results for input(s): \"PROT\", \"INR\", \"APTT\" in the last 72 hours.     No results for input(s): \"CKTOTAL\", \"CKMB\", \"CKMBINDEX\", \"TROPONINI\" in the last 72 hours.      ASSESSMENT & PLAN:   This is a 64 y.o. female with  history of CKD, DVTs, HLD, HTN, thyroid disease, vascular dementia.  Contacted by vascular team due to concern for developing SJS/TENS.

## 2024-06-27 NOTE — PROGRESS NOTES
ICU Progress Note    Admit Date: 6/21/2024  Hospital Day: 6  ICU Day: 5d 12h  Vent Day: 7  IV Access: Peripheral, Central  IV Fluids: None  Vasopressors: Norepinephrine  Antibiotics: Vancomycin, Zosyn (06/21 - )  Diet: Diet NPO  ADULT TUBE FEEDING VOLUME BASED; Orogastric; Renal Formula; 30; Continuous; 240; 24  PCP: Thalia Au MD  Code Status: Full Code    CC: Respiratory Distress (Pt brought in by EMS from SNF for respiratory distress, CIPAP placed en route )    Interval history:  VSS, NAEO overnight. Still becoming extremely agitated even on high doses of Precedex and fentanyl.     UOP 0 mL, on CRRT net even past 24H. Off CRRT today.    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     HPI:  Ms. Marcela Colby is a 64 y.o. female with a MHx significant for CKD V, chronic anemia, HTN, hypothyroidism, history of DVT and PE who presented with respiratory distress.     She initially presented to the ED 6/21 with pain in her right leg and foot. She was worked up for RLE DVT, venous doppler unremarkable. She presented later in the day again due to respiratory distress. Arrived on CPAP. Was initially hypertensive, febrile to 102.3F, tachycardic to 108 bpm, tachypneic to 42/min, saturating 100% on 100% FiO2. She had an elevated lactic acid, chest xray suspicious for possible pneumonia. Was being treated for suspected septic shock in the ED, with additional concern for hypervolemia. She was started on vancomycin and cefepime, given 500 mL IVF due to hypervolemia and CKD V history.      While waiting for transit to ICU she went into cardiac arrest, reportedly PEA. Had about 30 minutes of CPR, was intubated, received calcium, 2 amps bicarb, epinephrine pushes. Crash L femoral arterial line placed. After ROSC was achieved she was started on an epinephrine gtt. Sterile R femoral CVC placed in ED.      She was  Heated wire vent circuit due to thick secretions  - MRSA nares negative  - Continue broad spectrum abx for possible cellulitis, which will also cover her CAP  - Vanc/Zosyn (06/21 - )   - HOB > 30 degrees  - PPI ppx  - Triglycerides elevated, transitioned off propofol  - Passed SBT this morning with good gas exchange on ABG  - Will initiate PO oxycodone scheduled for pain control, place NG tube prior to extubation  - PRN Dilaudid 0.5 mg Q3H, can up-titrate as needed      Cardiovascular  S/p cardiac arrest  Cardiogenic vs septic shock  NSTEMI  Unclear etiology for her arrest, reportedly PEA. Possibly d/t PE, sepsis. Troponins peaked at 496, but she arrested and isn't stable for an angio. Trop now down trending.   Pressor requirements improving.  - Cardiology consulted, appreciate recs  - Levo continues to wean down, now at 3.   - Continue Levo as needed for pressure support, wean as tolerated  - CAP treatment per above    New HFmrEF  06/22 ECHO with EF 35-40%, mild global hypokinesis, unable to assess RV systolic function.  Does not appear to be volume overloaded.  - Treatment per above      FEN/GI  Acute liver failure 2/2 ischemia; improving  LFTs elevated, INR elevated to 2.8 post-arrest. LFTs now down trending after peaking at low 1k.   - Continue to trend hepatic function panel    Diet NPO  ADULT TUBE FEEDING VOLUME BASED; Orogastric; Renal Formula; 30; Continuous; 240; 24  Will start trophic TF today per RD.  Start bowel regimen.     Renal   LUIS on CKD V  Baseline GFR around 11, now at 6-7 range post-arrest. Follows with nephrology, Dr. Box ([facility]).  - Kidney numbers improved with CRRT  - Nephrology consulted, appreciate recs; now off CRRT. Most likely does not need HD today  - Avoid nephrotoxins  - Renally dose medications  - MAP goal > 65  - Strict I/Os        No acute issues at this time.      Heme/Onc  Acute leukopenia; resolved  Acute leukocytosis; worsening  Suspect 2/2 sepsis, treatment for  deteriorated, but electrolytes are in normal range, and she does not have uremia.  No indication for dialysis at this point.  Discussed with surgery service, cardiology  Time spent in management of critical care 55 minutes

## 2024-06-27 NOTE — PROGRESS NOTES
[facility] Progress Note    CC: LUIS on CKD 4    64 y.o. year old female who we are seeing in consultation for acute kidney injury.  Patient has a history of chronic kidney disease stage IV.  Had PEA arrest on presentation.         Subjective/     Remains off CRRT since last night.   Noted plans for extubation today.   Skin bx SJS   Off pressors. No UOP.     ROS: unable    No visitors    Objective/   GEN:  Chronically ill, /67   Pulse 100   Temp 98.7 °F (37.1 °C) (Axillary)   Resp 26   Ht 1.6 m (5' 3\")   Wt (!) 139.7 kg (307 lb 15.7 oz)   LMP  (LMP Unknown) Comment: unsure  SpO2 94%   BMI 54.56 kg/m²     GEN: morbidly obese, on vent, critically ill  HEENT: non-icteric, ETT in place  NECK: no JVD  CV: S1, S2 without m/r/g  RESP: diminished BS bibasilar  ABD: Soft, NT  SKIN: warm,   EXT: peripheral and dependent edema LLE and back; RLE with Desquamation lesions RLE  NEURO: sedated    Data/  CBC:   Recent Labs     06/25/24  0537 06/26/24  0554 06/27/24  0603   WBC 13.4* 20.0* 32.2*   RBC 2.63* 2.55* 2.78*   HGB 8.3* 7.9* 8.3*   HCT 24.8* 23.9* 25.8*   PLT 64* 54* 58*       BMP:    Recent Labs     06/26/24  0554 06/26/24  1746 06/27/24  0603   * 137 136   K 3.9 4.0 4.0    102 102   CO2 21 21 21   BUN 9 10 19   CREATININE 1.0 0.9 1.7*   CALCIUM 8.5 8.4 8.9   GLUCOSE 92 108* 135*       Phosphorus:    Recent Labs     06/26/24  1746 06/27/24  0018 06/27/24  0603   PHOS 1.8* 2.6 2.6       Magnesium:    Recent Labs     06/26/24  0554 06/26/24  1746 06/27/24  0603   MG 2.30 2.20 2.40       Hepatic Function Panel:    Lab Results   Component Value Date/Time    ALKPHOS 205 06/27/2024 06:03 AM     06/27/2024 06:03 AM    AST 37 06/27/2024 06:03 AM    PROT 7.9 06/15/2017 12:12 PM    BILITOT 1.0 06/27/2024 06:03 AM    BILIDIR 0.4 06/22/2024 12:38 PM    IBILI 0.2 06/22/2024 12:38 PM             Assessment/     Acute kidney injury stage III likely ischemic ATN in the setting of  prolonged PEA arrest  Anuric  Patient had previously expressed she would not want dialysis  Discussions with Dr Gutierrez and legal guardian: decided to proceed with CRRT  CRRT dced 6/26  Off pressors  Acid/base/lytes stable  - no urgent HD needs today  - assess tomorrow for intermittent HD needs  - pharmacy to dose medicines with anuric LUIS.     Chronic kidney disease stage V secondary to hypertensive nephrosclerosis   creatinine baseline around 4.7 mg/dL.    Follows with Dr. Box  -Per previous documentation patient is not interested in long term renal replacement therapy.    S/p PEA arrest   requiring several rounds of CPR    Acute hypoxic respiratory failure  On vent    Shock  Sepsis and post cardiac arrest  Off pressors   RLE showing changes of desquamation- SJS on bx     Desquamation RLE/SJS   Seen by Vasc Surgery with reasonable perfusion  Seen by Plastic Surgery  Skin biopsy done on 6/25 - SJS . Management per surgery       Shock liver  Transaminases improved    Electrolytes  Acidosis improved post CRRT     Anemia  - on LISA    Prognosis limited if not poor  - Palliative Care involved  - full support at present        ____________________________________  Latonia Hunter MD  [facility]  [email or web address]  Office: [phone]

## 2024-06-27 NOTE — PROGRESS NOTES
Spoke with Dr. Jimenez and pharmacy regarding giving heparin dose this afternoon and discussed platelet count. Ok to give. Plan of care ongoing.

## 2024-06-27 NOTE — PROGRESS NOTES
[facility] -  Clinical Pharmacy Note    Vancomycin - Management by Pharmacy    Consult Date(s): 6/21/24  Consulting Provider(s):  Dr. Hammer    Assessment / Plan  PNA, SJS with RLE cellulitis - Vancomycin  Concurrent Antimicrobials: Zosyn - day #7  Will adjust per Renal Dose Policy to 4.5g IV EI q12h as CRRT stopped, and likely transition to iHD  Day of Vanc Therapy / Ordered Duration: #7  Current Dosing Method: Intermittent Dosing by Levels  Therapeutic Goal: Trough ~ 15 mg/L  Current Dose / Plan:   Hx CKD stage IV; baseline SCr ~4.7 per Nephro.  CRRT stopped last evening, planning to transition to HD if needed.  SCr 0.9 --> 1.7 today.  No UOP.  Will await Nephrology plan.  Will continue to dose vancomycin intermittently based on levels  Level today = 22.6 mg/L  No further vancomycin today, regardless of dialysis plan   Plan to obtain a random level 6/28 AM and reassess  Will discuss course duration with ICU team  Will continue to monitor clinical condition and make adjustments to regimen as appropriate    Thank you for consulting pharmacy.    Milvia FloydD., BCPS   6/27/2024 8:04 AM  Wireless: [phone]        Interval update:  Patient remains intubated and sedated this morning with fentanyl and dexmedetomidine.  Weaned down to only norepi gtt at low dose for BP support.  CRRT stopped at 23:00 last evening.  Nephro to assess, as pt with no UOP and will likely need HD.  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  Surgery planning for skin cultures.      Subjective/Objective: Marcela Colby is a 64 y.o. female with a PMHx significant for HLD, HTN, venous thrombosis and embolism, chronic lymphedema, hypothyroidism, Vascular dementia, CKD stage-V.  Presented from NH with SOB and lower leg pain/swelling.  PEA cardiac arrest in ED and achieved ROSC after several rounds of CPR. Admitted 6/21 to ICU with aspiration pneumonia vs LE cellulitis, septic shock, possible PE.     Pharmacy is consulted to dose

## 2024-06-28 PROBLEM — L51.1 STEVENS-JOHNSON SYNDROME (HCC): Status: ACTIVE | Noted: 2024-06-28

## 2024-06-28 PROBLEM — L98.491 SKIN ULCER, LIMITED TO BREAKDOWN OF SKIN (HCC): Status: ACTIVE | Noted: 2024-06-28

## 2024-06-28 PROBLEM — D72.829 LEUKOCYTOSIS: Status: ACTIVE | Noted: 2024-06-28

## 2024-06-28 LAB
ALBUMIN SERPL-MCNC: 2.5 G/DL (ref 3.4–5)
ANION GAP SERPL CALCULATED.3IONS-SCNC: 16 MMOL/L (ref 3–16)
BASOPHILS # BLD: 0 K/UL (ref 0–0.2)
BASOPHILS NFR BLD: 0 %
BUN SERPL-MCNC: 53 MG/DL (ref 7–20)
CALCIUM SERPL-MCNC: 9.1 MG/DL (ref 8.3–10.6)
CHLORIDE SERPL-SCNC: 105 MMOL/L (ref 99–110)
CO2 SERPL-SCNC: 20 MMOL/L (ref 21–32)
CREAT SERPL-MCNC: 3.4 MG/DL (ref 0.6–1.2)
DEPRECATED RDW RBC AUTO: 16.3 % (ref 12.4–15.4)
EOSINOPHIL # BLD: 0 K/UL (ref 0–0.6)
EOSINOPHIL NFR BLD: 0 %
GFR SERPLBLD CREATININE-BSD FMLA CKD-EPI: 14 ML/MIN/{1.73_M2}
GLUCOSE BLD-MCNC: 119 MG/DL (ref 70–99)
GLUCOSE BLD-MCNC: 123 MG/DL (ref 70–99)
GLUCOSE BLD-MCNC: 131 MG/DL (ref 70–99)
GLUCOSE SERPL-MCNC: 104 MG/DL (ref 70–99)
HBV SURFACE AB SERPL IA-ACNC: 189.9 MIU/ML
HBV SURFACE AG SERPL QL IA: NORMAL
HCT VFR BLD AUTO: 24.8 % (ref 36–48)
HGB BLD-MCNC: 7.9 G/DL (ref 12–16)
LYMPHOCYTES # BLD: 1.8 K/UL (ref 1–5.1)
LYMPHOCYTES NFR BLD: 5 %
MAGNESIUM SERPL-MCNC: 2.8 MG/DL (ref 1.8–2.4)
MCH RBC QN AUTO: 29.8 PG (ref 26–34)
MCHC RBC AUTO-ENTMCNC: 31.9 G/DL (ref 31–36)
MCV RBC AUTO: 93.5 FL (ref 80–100)
MONOCYTES # BLD: 1.5 K/UL (ref 0–1.3)
MONOCYTES NFR BLD: 4 %
NEUTROPHILS # BLD: 33.4 K/UL (ref 1.7–7.7)
NEUTROPHILS NFR BLD: 91 %
PERFORMED ON: ABNORMAL
PHOSPHATE SERPL-MCNC: 5 MG/DL (ref 2.5–4.9)
PLATELET # BLD AUTO: 64 K/UL (ref 135–450)
PLATELET BLD QL SMEAR: ABNORMAL
PMV BLD AUTO: 9.6 FL (ref 5–10.5)
POTASSIUM SERPL-SCNC: 4.4 MMOL/L (ref 3.5–5.1)
RBC # BLD AUTO: 2.65 M/UL (ref 4–5.2)
RBC MORPH BLD: NORMAL
SLIDE REVIEW: ABNORMAL
SODIUM SERPL-SCNC: 141 MMOL/L (ref 136–145)
WBC # BLD AUTO: 36.7 K/UL (ref 4–11)

## 2024-06-28 PROCEDURE — 94761 N-INVAS EAR/PLS OXIMETRY MLT: CPT

## 2024-06-28 PROCEDURE — 2580000003 HC RX 258

## 2024-06-28 PROCEDURE — 6370000000 HC RX 637 (ALT 250 FOR IP)

## 2024-06-28 PROCEDURE — 83735 ASSAY OF MAGNESIUM: CPT

## 2024-06-28 PROCEDURE — 99223 1ST HOSP IP/OBS HIGH 75: CPT | Performed by: INTERNAL MEDICINE

## 2024-06-28 PROCEDURE — 90935 HEMODIALYSIS ONE EVALUATION: CPT

## 2024-06-28 PROCEDURE — 2700000000 HC OXYGEN THERAPY PER DAY

## 2024-06-28 PROCEDURE — 36415 COLL VENOUS BLD VENIPUNCTURE: CPT

## 2024-06-28 PROCEDURE — 85025 COMPLETE CBC W/AUTO DIFF WBC: CPT

## 2024-06-28 PROCEDURE — 6360000002 HC RX W HCPCS

## 2024-06-28 PROCEDURE — 99291 CRITICAL CARE FIRST HOUR: CPT | Performed by: INTERNAL MEDICINE

## 2024-06-28 PROCEDURE — 87340 HEPATITIS B SURFACE AG IA: CPT

## 2024-06-28 PROCEDURE — 2000000000 HC ICU R&B

## 2024-06-28 PROCEDURE — 80069 RENAL FUNCTION PANEL: CPT

## 2024-06-28 PROCEDURE — 2500000003 HC RX 250 WO HCPCS

## 2024-06-28 PROCEDURE — 99232 SBSQ HOSP IP/OBS MODERATE 35: CPT | Performed by: INTERNAL MEDICINE

## 2024-06-28 PROCEDURE — 86706 HEP B SURFACE ANTIBODY: CPT

## 2024-06-28 RX ADMIN — POLYETHYLENE GLYCOL 3350 17 G: 17 POWDER, FOR SOLUTION ORAL at 20:42

## 2024-06-28 RX ADMIN — DOCUSATE SODIUM 50 MG AND SENNOSIDES 8.6 MG 2 TABLET: 8.6; 5 TABLET, FILM COATED ORAL at 08:45

## 2024-06-28 RX ADMIN — HYDROMORPHONE HYDROCHLORIDE 1 MG: 1 INJECTION, SOLUTION INTRAMUSCULAR; INTRAVENOUS; SUBCUTANEOUS at 21:47

## 2024-06-28 RX ADMIN — SODIUM CHLORIDE, PRESERVATIVE FREE 10 ML: 5 INJECTION INTRAVENOUS at 09:32

## 2024-06-28 RX ADMIN — HEPARIN SODIUM 5000 UNITS: 5000 INJECTION INTRAVENOUS; SUBCUTANEOUS at 14:17

## 2024-06-28 RX ADMIN — HYDROMORPHONE HYDROCHLORIDE 1 MG: 1 INJECTION, SOLUTION INTRAMUSCULAR; INTRAVENOUS; SUBCUTANEOUS at 15:04

## 2024-06-28 RX ADMIN — OXYCODONE 15 MG: 15 TABLET ORAL at 12:11

## 2024-06-28 RX ADMIN — FOLIC ACID 1 MG: 1 TABLET ORAL at 08:45

## 2024-06-28 RX ADMIN — POLYVINYL ALCOHOL 1 DROP: 1.4 SOLUTION/ DROPS OPHTHALMIC at 03:31

## 2024-06-28 RX ADMIN — HEPARIN SODIUM 5000 UNITS: 5000 INJECTION INTRAVENOUS; SUBCUTANEOUS at 06:02

## 2024-06-28 RX ADMIN — OXYCODONE 15 MG: 15 TABLET ORAL at 00:46

## 2024-06-28 RX ADMIN — Medication: at 08:46

## 2024-06-28 RX ADMIN — OXYCODONE 15 MG: 15 TABLET ORAL at 06:02

## 2024-06-28 RX ADMIN — Medication: at 20:41

## 2024-06-28 RX ADMIN — LEVOTHYROXINE SODIUM 50 MCG: 50 TABLET ORAL at 06:02

## 2024-06-28 RX ADMIN — POLYVINYL ALCOHOL 1 DROP: 1.4 SOLUTION/ DROPS OPHTHALMIC at 08:46

## 2024-06-28 RX ADMIN — OXYCODONE 15 MG: 15 TABLET ORAL at 18:05

## 2024-06-28 RX ADMIN — HEPARIN SODIUM 5000 UNITS: 5000 INJECTION INTRAVENOUS; SUBCUTANEOUS at 21:47

## 2024-06-28 RX ADMIN — HYDROMORPHONE HYDROCHLORIDE 1 MG: 1 INJECTION, SOLUTION INTRAMUSCULAR; INTRAVENOUS; SUBCUTANEOUS at 08:49

## 2024-06-28 RX ADMIN — LANSOPRAZOLE 30 MG: 30 TABLET, ORALLY DISINTEGRATING, DELAYED RELEASE ORAL at 06:02

## 2024-06-28 RX ADMIN — POLYVINYL ALCOHOL 1 DROP: 1.4 SOLUTION/ DROPS OPHTHALMIC at 15:05

## 2024-06-28 RX ADMIN — DOCUSATE SODIUM 50 MG AND SENNOSIDES 8.6 MG 2 TABLET: 8.6; 5 TABLET, FILM COATED ORAL at 21:47

## 2024-06-28 RX ADMIN — POLYVINYL ALCOHOL 1 DROP: 1.4 SOLUTION/ DROPS OPHTHALMIC at 12:42

## 2024-06-28 RX ADMIN — DEXMEDETOMIDINE 0.2 MCG/KG/HR: 200 INJECTION, SOLUTION INTRAVENOUS at 02:07

## 2024-06-28 RX ADMIN — POLYVINYL ALCOHOL 1 DROP: 1.4 SOLUTION/ DROPS OPHTHALMIC at 20:41

## 2024-06-28 ASSESSMENT — PAIN SCALES - GENERAL
PAINLEVEL_OUTOF10: 4
PAINLEVEL_OUTOF10: 0
PAINLEVEL_OUTOF10: 7
PAINLEVEL_OUTOF10: 4
PAINLEVEL_OUTOF10: 4
PAINLEVEL_OUTOF10: 10
PAINLEVEL_OUTOF10: 10
PAINLEVEL_OUTOF10: 8
PAINLEVEL_OUTOF10: 7

## 2024-06-28 ASSESSMENT — PAIN DESCRIPTION - LOCATION
LOCATION: GENERALIZED
LOCATION: GENERALIZED

## 2024-06-28 NOTE — PROGRESS NOTES
Pt with Jim Endy's syndrome. Nutrition support calculated based on surgical burn protocol using Curreri formula for 10% surface area. Modified TF.    Curreri formula  2591 kcal  175 g/pro    TF:   Nepro @ 55 mL/hr  +3 Prosource daily  Provides: 2576 kcal, 167 g/pro    Viji Donaldson RD, LD    Jacobson: [phone]

## 2024-06-28 NOTE — PROGRESS NOTES
Palliative Care Chart Review  and Check in Note:     NAME:  Marcela Colby  Admit Date: 6/21/2024  Hospital Day:  Hospital Day: 8   Current Code status: Full Code    Palliative care is continuing to following Ms. Colby for symptom management,  and goals of care discussion as needed. Patient's chart reviewed today 6/28/24.      Saw pt at the bedside, she is moving in the bed but remains obtunded and non-interactive. D/w Dr. Shane at the bedside.    Called pt's guardian Liza and provided brief updates. She reported that pt's daughters officially filed to take over guardianship. The hearing is not until August, so Liza remains pt's guardian at this time however she is following family's wishes regarding health care decisions.    Spoke with pt's daughter Noa over the phone, provided brief updates. Discussed pt's mentation and likely need for a PEG tube at some point if they want to continue with life-prolonging care. Noa reported that she has been considering pt's quality of life and has realistic expectations regarding her recovery. She reported that she is working with her sister Emma for health care decision making and feels her expectations may be less realistic. She asked me to call Emma as well.     Called pt's daughter Emma, introduced palliative care, which she regarded with some suspicion. Emma is still very upset about the pt's guardianship and that was the main topic of conversation. I did update her as to the above conversation I had with Noa. Emma reported that they are considering transferring pt to . I attempted to discuss that process with her but she reported she already knew about it and had contacted a .     The following are the currently established goals/code status, and Symptom management.     Goals of care: Goals are this time are life-prolonging/rehabilitative. Will offer a family meeting if/when further decisions arise and we have a

## 2024-06-28 NOTE — PROGRESS NOTES
[facility] Progress Note    CC: LUIS on CKD 4    64 y.o. year old female who we are seeing in consultation for acute kidney injury.  Patient has a history of chronic kidney disease stage IV.  Had PEA arrest on presentation.         Subjective/     Remains off CRRT since night of 6/26/24  Extubated y day to NC .   Has ngt , on TF.   LE Skin bx SJS   Off pressors.   No UOP.   Not on sedation.   BP stable     ROS: unable    No visitors    Objective/   GEN:  Chronically ill, /81   Pulse 77   Temp 99.4 °F (37.4 °C) (Axillary)   Resp 19   Ht 1.6 m (5' 3\")   Wt (!) 140 kg (308 lb 10.3 oz)   LMP  (LMP Unknown) Comment: unsure  SpO2 95%   BMI 54.67 kg/m²     GEN: morbidly obese, on NC   HEENT: non-icteric,  NGT   NECK: no JVD  CV: S1, S2 without m/r/g  RESP: diminished BS bibasilar  ABD: Soft, NT  SKIN: warm,   EXT: peripheral and dependent edema LLE and back; RLE with Desquamation lesions RLE  NEURO: not following commands     Data/  CBC:   Recent Labs     06/26/24  0554 06/27/24  0603 06/28/24  0448   WBC 20.0* 32.2* 36.7*   RBC 2.55* 2.78* 2.65*   HGB 7.9* 8.3* 7.9*   HCT 23.9* 25.8* 24.8*   PLT 54* 58* 64*       BMP:    Recent Labs     06/26/24  1746 06/27/24  0603 06/28/24  0448    136 141   K 4.0 4.0 4.4    102 105   CO2 21 21 20*   BUN 10 19 53*   CREATININE 0.9 1.7* 3.4*   CALCIUM 8.4 8.9 9.1   GLUCOSE 108* 135* 104*       Phosphorus:    Recent Labs     06/27/24  0018 06/27/24  0603 06/28/24  0448   PHOS 2.6 2.6 5.0*       Magnesium:    Recent Labs     06/26/24  1746 06/27/24  0603 06/28/24  0448   MG 2.20 2.40 2.80*       Hepatic Function Panel:    Lab Results   Component Value Date/Time    ALKPHOS 205 06/27/2024 06:03 AM     06/27/2024 06:03 AM    AST 37 06/27/2024 06:03 AM    PROT 7.9 06/15/2017 12:12 PM    BILITOT 1.0 06/27/2024 06:03 AM    BILIDIR 0.4 06/22/2024 12:38 PM    IBILI 0.2 06/22/2024 12:38 PM             Assessment/     Anuric Acute kidney

## 2024-06-28 NOTE — PROGRESS NOTES
Anesthesia progress note    Late entry: Pt seen and examined at bedside. Pt sleeping and difficult to arouse. Does not follow commands. VSS. Large lesions to R LE seen. Given this pt's body habitus, mental status and leukocytosis, epidural is not appropriate at this time. If pain continues to be an issue, a R LE nerve blocks would be more appropriate. We would need POA consent before proceeding. Please contact anesthesiologist on call if pain continues to be an issue with this patient and we can do blocks at bedside.    BINDU Tejeda DO  [phone]

## 2024-06-28 NOTE — PROGRESS NOTES
V2.0    [facility] Progress Note      Name:  Marcela Colby /Age/Sex: 1960  (64 y.o. female)   MRN & CSN:  [id number] & [id number] Encounter Date/Time: 2024 9:13 AM EDT   Location:  [facility]4/4514-01 PCP: Thalia Au MD     Attending:Abhishek Ham MD       Hospital Day: 8    Assessment and Recommendations   Marcela Colby is a 64 y.o. female with pmh of CKD stage V, hyperlipidemia, hypertension, dementia, pancytopenia, hypothyroidism who presents with Cardiac arrest (HCC).  Patient was sent for right lower leg pain and swelling.  Had a Doppler which was negative.  Patient came back to the ED with dyspnea shortness of breath.  Patient was placed on BiPAP and was febrile.  Patient then went into PEA cardiac arrest.  RCS was after 20 minutes.  Patient was intubated on admission.  She has septic shock secondary to pneumonia with volume overload LUIS on CKD.  Started on CRRT and pressors.  Pneumonia panel positive for Moraxella H influenza.  Urine for strep pneumonia positive      Plan:     Cardiac arrest  Acute encephalopathy  -PEA cardiac arrest, cause of arrest unclear  -reported ~20 minutes until ROSC  -wean sedation as able. Monitor neuro status. Depending on clinical course may warrant repeat brain imaging    Acute respiratory Failure with hypoxia, improving  Acute Pneumonia  - Pulm/Crit following, extubated , wean oxygen as tolerated  - Completed abx course    SJS of RLE  Leukocytosis, worsening  - Vascular surgery consulted  - Plastic surgery consulted and following, performed punch biopsy, dermatopathology returned and c/w SJS, unclear etiology  - Continue wound care   - Completed abx course earlier in hospitalization. ID evaluated patient today and do not recommend further abx at this time as no evidence of new infection    ARF superimposed on CKD V  Nephrology consulted  Off CRRT but remains anuric overnight with worsening renal function labs, starting intermittent HD  Monitor UOP and renal function  swelling is seen beginning in the mid thigh and extending through the catheter. No discrete abscess is seen. No focal masses. There is patient motion during the examination and fractures of the distal fibula cannot findings are likely related to motion in this region correlate clinically. No evidence of any gas within the soft tissues.     1.   Limited exam patient motion. 2.  Diffuse soft tissue swelling. Electronically signed by Arielle Menon    XR CHEST PORTABLE    Result Date: 6/21/2024  EXAM: XR CHEST PORTABLE INDICATION: altered mental status COMPARISON: CT dated 6/18/2020 FINDINGS: Medical Devices: None. Lungs: Bilateral hilar opacities Pleura: No pneumothorax or pleural effusion. Heart and Mediastinum: The cardiomediastinal silhouette is within normal limits. Bones: No acute suspicious abnormality.     Bilateral infrahilar atelectasis or pneumonia. Electronically signed by Zenon High    Vascular duplex lower extremity venous right    Result Date: 6/21/2024    No evidence of deep vein and superficial thrombosis in the right lower extremity.     XR TIBIA FIBULA RIGHT (2 VIEWS)    Result Date: 6/21/2024  EXAM: XR TIBIA FIBULA RIGHT (2 VIEWS). DATE: 6/21/2024 5:36 AM. INDICATION: leg pain COMPARISON: None TECHNIQUE: Standard per department protocol. VIEWS OBTAINED: 4 FINDINGS: No acute fracture or dislocation/subluxation. Moderate osteoarthrosis of the knee. The soft tissues are normal.     No acute osseous abnormality. Electronically signed by Jeremy Monroe    XR FOOT RIGHT (MIN 3 VIEWS)    Result Date: 6/21/2024  EXAM: XR FOOT RIGHT (MIN 3 VIEWS). DATE: 6/21/2024 5:36 AM. INDICATION: foot pain COMPARISON: None TECHNIQUE: Standard per department protocol. VIEWS OBTAINED: 4 FINDINGS: No acute fracture or dislocation/subluxation. Plantar calcaneal spur. Joint spaces intact. Dorsal forefoot soft tissue swelling.     No acute osseous abnormality. Dorsal forefoot soft tissue swelling. Electronically

## 2024-06-28 NOTE — PROGRESS NOTES
Plastic Surgery   Daily Progress Note  Patient: Marcela Colby      CC: concern for SJS    SUBJECTIVE:   Stable mental status. Still becoming extremely agitated even on high doses of Precedex and fentanyl     ROS:   A 14 point review of systems was conducted, significant findings as noted above. All other systems negative.    OBJECTIVE:    PHYSICAL EXAM:    Vitals:    06/28/24 0400 06/28/24 0500 06/28/24 0552 06/28/24 0600   BP:       Pulse: 86 77  83   Resp: 24 19  20   Temp: 97.9 °F (36.6 °C)      TempSrc: Temporal      SpO2: 97% 93%  98%   Weight:   (!) 140 kg (308 lb 10.3 oz)    Height:           General appearance: intubated, sedated  Eyes: No scleral icterus, EOM grossly intact  Neck: Trachea midline, no JVD  Chest/Lungs:  on 4 L NC  Cardiovascular: RRR  Skin: Areas of bullae and sloughing epidermis across the right lower leg spreading up to the distal right thigh, also with desquamated areas with non-blanchable underlying soft tissue changes  Extremities: chronic lymphedema bilaterally   Neuro: Unable to assess    LABS:   Recent Labs     06/27/24  0603 06/28/24  0448   WBC 32.2* 36.7*   HGB 8.3* 7.9*   HCT 25.8* 24.8*   MCV 92.8 93.5   PLT 58* 64*          Recent Labs     06/26/24  1746 06/27/24  0018 06/27/24  0603     --  136   K 4.0  --  4.0     --  102   CO2 21  --  21   PHOS 1.8* 2.6 2.6   BUN 10  --  19   CREATININE 0.9  --  1.7*          Recent Labs     06/26/24  1746 06/27/24  0603   AST 51* 37   * 228*   BILITOT 1.4* 1.0   ALKPHOS 146* 205*        No results for input(s): \"LIPASE\", \"AMYLASE\" in the last 72 hours.     Recent Labs     06/27/24 2020   INR 1.27*        No results for input(s): \"CKTOTAL\", \"CKMB\", \"CKMBINDEX\", \"TROPONINI\" in the last 72 hours.      ASSESSMENT & PLAN:   This is a 64 y.o. female with  history of CKD, DVTs, HLD, HTN, thyroid disease, vascular dementia.  Contacted by vascular team due to concern for developing SJS/TENS.  Lesions are necrolytic, sloughing,  and extensive across the right lower extremity.  No appreciated mucosal involvement currently.     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 No need to treat cultures of wounds as it is common for them to be colonized with various bacteria.   - ID consulted for recommendations as well.   -remainder of care per primary      Alicia Fields,   General Surgery  06/28/24   6:56 AM     [phone]

## 2024-06-28 NOTE — PLAN OF CARE
At ICU sign out, was notified by nurse that Dr. Jimenez reccommended to contact anesthesiology to have an epidural placed for uncontrolled pain for Ms. Colby.    Contacted anesthesiologist, Dr. Sophia Tejeda. Discussed the case with her and she stated this was not an urgent situation so there was no need to come to the hospital now for epidural placement and this can be placed in the morning if still needed.    If situations worsens overnight, will reach out again. Otherwise will contact in the morning.      Alvaro Gramajo MD  Internal Medicine PGY-1

## 2024-06-28 NOTE — PROGRESS NOTES
Cardiology Consult Service  Daily Progress Note        Admit Date:  6/21/2024    Subjective:     Interval history:  somnolent    Objective:     Medications:   polyethylene glycol  17 g Per NG tube BID    sennosides-docusate sodium  2 tablet Per NG tube BID    oxyCODONE  15 mg Oral Q6H    balsum peru-castor oil   Topical BID    insulin lispro  0-4 Units SubCUTAneous Q6H    lidocaine-EPINEPHrine  20 mL IntraDERmal Once    epoetin shannon-epbx  10,000 Units SubCUTAneous Once per day on Tue Thu Sat    sodium chloride flush  5-40 mL IntraVENous 2 times per day    levothyroxine  50 mcg Oral Daily    [Held by provider] calcitRIOL  0.5 mcg Oral Q MWF    folic acid  1 mg Oral Daily    lansoprazole  30 mg Orogastric QAM AC    polyvinyl alcohol  1 drop Both Eyes Q4H    sodium chloride flush  5-40 mL IntraVENous 2 times per day    heparin (porcine)  5,000 Units SubCUTAneous 3 times per day       IV drips:   dextrose      sodium chloride      dexmedeTOMIDine (PRECEDEX) 400 mcg in sodium chloride 0.9 % 100 mL infusion Stopped (06/28/24 0839)    sodium chloride      norepinephrine Stopped (06/27/24 0952)    VASOpressin 20 Units in sodium chloride 0.9 % 100 mL infusion Stopped (06/26/24 2119)       PRN:  HYDROmorphone, naloxone, glucose, dextrose bolus **OR** dextrose bolus, glucagon (rDNA), dextrose, sodium chloride flush, sodium chloride, ALTEplase, sodium chloride flush **AND** sodium chloride flush, perflutren lipid microspheres, sodium chloride flush, sodium chloride, ondansetron **OR** ondansetron, acetaminophen **OR** acetaminophen    Vitals:    06/28/24 1030 06/28/24 1045 06/28/24 1100 06/28/24 1115   BP: 138/72 139/65 139/64 (!) 141/55   Pulse: 76 84 77 78   Resp: 18 19 16 19   Temp:       TempSrc:       SpO2: 93% 94% 96% 97%   Weight:       Height:           Intake/Output Summary (Last 24 hours) at 6/28/2024 1149  Last data filed at 6/28/2024 1000  Gross per 24 hour   Intake 281 ml   Output 0 ml   Net 281 ml       I/O last 3  completed shifts:  In: 1340 [P.O.:30; I.V.:936; NG/GT:374]  Out: 740   Wt Readings from Last 3 Encounters:   06/28/24 (!) 140 kg (308 lb 10.3 oz)   06/21/24 (!) 138.8 kg (306 lb 1.6 oz)   09/15/23 (!) 138.8 kg (306 lb)       Admit Wt: Weight - Scale: (!) 141.7 kg (312 lb 6.3 oz)   Todays Wt: Weight - Scale: (!) 140 kg (308 lb 10.3 oz)        Physical Exam:     Physical Exam  Constitutional:       Appearance: Normal appearance.   HENT:      Head: Normocephalic and atraumatic.   Cardiovascular:      Rate and Rhythm: Normal rate and regular rhythm.   Pulmonary:      Effort: Pulmonary effort is normal.      Breath sounds: Rhonchi present.            Labs:   Recent Labs     06/26/24  1746 06/27/24  0603 06/28/24  0448    136 141   K 4.0 4.0 4.4   BUN 10 19 53*   CREATININE 0.9 1.7* 3.4*    102 105   CO2 21 21 20*   GLUCOSE 108* 135* 104*   CALCIUM 8.4 8.9 9.1   MG 2.20 2.40 2.80*       Recent Labs     06/26/24  0554 06/27/24  0603 06/28/24  0448   WBC 20.0* 32.2* 36.7*   HGB 7.9* 8.3* 7.9*   HCT 23.9* 25.8* 24.8*   PLT 54* 58* 64*   MCV 93.9 92.8 93.5       Recent Labs     06/26/24  0554   TRIG 470*       Recent Labs     06/27/24 2020   INR 1.27*     No results for input(s): \"CKTOTAL\", \"CKMB\", \"CKMBINDEX\", \"TROPONINI\" in the last 72 hours.  No results for input(s): \"BNP\" in the last 72 hours.  No results for input(s): \"NTPROBNP\" in the last 72 hours.  No results for input(s): \"TSH\" in the last 72 hours.    Imaging:       Assessment & Plan:     Cardiopulmonary arrest  Septic shock  NSTEMI  Systolic heart failure, unknown chronicity, post cardiopulmonary arrest  Acute kidney injury on CKD   Acute liver failure  Pneumonia     Continue supportive care including mechanical ventilation and pressors  Broad-spectrum antibiotics per hospitalist service  Goal-directed medical therapy limited by blood pressure at this time  Continue weaning pressors    The note was completed using EMR and Dragon dictation system. Every

## 2024-06-28 NOTE — PROGRESS NOTES
ICU Progress Note    Admit Date: 6/21/2024  Hospital Day: 7  ICU Day: 6d 9h  Vent Day: Extubated 06/27  IV Access: Peripheral, PICC  IV Fluids: None  Vasopressors: None  Antibiotics: S/p Vancomycin, Zosyn (06/21 - 06/27)  Diet: Diet NPO  ADULT TUBE FEEDING VOLUME BASED; Orogastric; Renal Formula; 30; Continuous; 1,200; 24  PCP: Thalia Au MD  Code Status: Full Code    CC: Respiratory Distress (Pt brought in by EMS [facility] for respiratory distress, CIPAP placed en route )    Interval history:  Patient agitated, with some mild hypertension and tachycardia overnight. Had to start Precedex gtt. Made attempts to coordinate epidural placement overnight, no emergent need, will reassess today to see if she still needs it.      On my exam this morning she still makes non-purposeful movements, intermittently moving both legs in a rhythmic pattern. Otherwise unchanged neuro exam off sedation.     UOP 0 mL. Off CRRT yesterday.    HPI:  Ms. Marcela Colby is a 64 y.o. female with a MHx significant for CKD V, chronic anemia, HTN, hypothyroidism, history of DVT and PE who presented with respiratory distress.     She initially presented to the ED 6/21 with pain in her right leg and foot. She was worked up for RLE DVT, venous doppler unremarkable. She presented later in the day again due to respiratory distress. Arrived on CPAP. Was initially hypertensive, febrile to 102.3F, tachycardic to 108 bpm, tachypneic to 42/min, saturating 100% on 100% FiO2. She had an elevated lactic acid, chest xray suspicious for possible pneumonia. Was being treated for suspected septic shock in the ED, with additional concern for hypervolemia. She was started on vancomycin and cefepime, given 500 mL IVF due to hypervolemia and CKD V history.      While waiting for transit to ICU she went into cardiac arrest, reportedly PEA. Had about 30 minutes of CPR, was intubated, received calcium, 2 amps bicarb, epinephrine pushes. Crash L femoral arterial  HTN, history of DVT and PE who presented with respiratory distress.    Neuro  Concern for anoxic brain injury  Acute encephalopathy in setting of underlying dementia  Unsure of pt's baseline. Reports of non-purposeful jerking movements located in her head and all extremities.Cerebell revealed 0% seizure burden. CT head revealed no acute intracranial process.   - Suspect her current neurological changes are due to her metabolic derangements or anoxic injury s/p arrest  - Off sedation today, still with no purposeful movements or command-following aside from pulling eyelids down and shaking her head when pulling her eyelids up.  - Wean sedation as tolerated for neuro exam      Pulmonary  Acute hypoxic respiratory failure 2/2 PNA; improving  H flu, strep pneumo, and Moraxella positive  Concern for aspiration    Presented in respiratory distress, requiring CPAP via EMS. Initially high suspicion for PE given her history, body habitus, sepsis, presentation. However, given CKD 4, and pt coding in ED, CTPA was held off.   - Extubated 06/27  - S/p Vanc/Zosyn (06/21 - 06/27)       Cardiovascular  S/p cardiac arrest  Cardiogenic vs septic shock; resolved  NSTEMI  Unclear etiology for her arrest, reportedly PEA. Possibly d/t PE, sepsis. Troponins peaked at 496, but she arrested and isn't stable for an angio. Trop now down trending.   No longer requiring pressors  - Cardiology consulted, appreciate recs  - Continue telemetry monitoring    [facility]  06/22 ECHO with EF 35-40%, mild global hypokinesis, unable to assess RV systolic function.  Does not appear to be in acute exacerbation.  - Cardiology consulted, appreciate recs  - Treatment per above      FEN/GI  Acute liver failure 2/2 ischemia; improving  LFTs elevated, INR elevated to 2.8 post-arrest. LFTs now down trending after peaking at low 1k.   - Continue to trend hepatic function panel    Diet NPO  ADULT TUBE FEEDING VOLUME BASED; Orogastric; Renal Formula; 30; Continuous;

## 2024-06-28 NOTE — DIALYSIS
Patient new to HD. Hepatitis labs ordered. Dialysis RN spoke with [facility] ICU RN to have labs drawn as dialysis RN is currently at another facility doing HD.

## 2024-06-28 NOTE — CONSULTS
Infectious Diseases Inpatient Consult Note    Reason for Consult:   R LE wound  Requesting Physician:   Dr Ham  Primary Care Physician:  Thalia Au MD  History Obtained From:   Pt, EPIC    Admit Date: 6/21/2024  Hospital Day: 8    CHIEF COMPLAINT:       Chief Complaint   Patient presents with    Respiratory Distress     Pt brought in by EMS from SNF for respiratory distress, CIPAP placed en route        HISTORY OF PRESENT ILLNESS:      65 yo woman   PMH - HTN, schizophrenia, dementia, hypothyroid, CKD  PSurgHx - HAILY/BSO  Lives in [facility]     Presented with R leg and foot pain.  Pain severe.  No trauma.    In ED, T 99.6.  Cr 5.0,  L:A 4.4, WBC 1.4 - 79%PMN  X-ray - no osseous changes, no gas; CT - no fluid collection   Pt had PEA arrest in ED, CPR, bicarb, epi.    In ICU - vent, NSTEMI, CHF, AFKI management  Seen by Gen Surg for eval / management of wounds -   Bx - sent to  Dermatopath, + Jim Schumacher by report  Wound cult sent - GS no WBC, no organism    Tm 100.1.  WBC 36.6.  Cr 3.4    Past Medical History:    Past Medical History:   Diagnosis Date    Anemia     Anxiety     Atelectasis 3/29/2017    CKD (chronic kidney disease)     Clotting disorder (HCC)     Constipation     Depression     Hyperlipidemia     Hypertension     Muscle weakness     Pancytopenia (HCC)     Paranoid schizophrenia (HCC)     Personal history of venous thrombosis and embolism     Thrombocytopenia (HCC)     Thyroid disease     hypothyroidism    Vascular dementia (HCC)        Past Surgical History:    Past Surgical History:   Procedure Laterality Date    DILATION AND CURETTAGE OF UTERUS  03/01/2017    hysterscope and myosure ablation    HYSTERECTOMY (CERVIX STATUS UNKNOWN)      HAILY AND BSO (CERVIX REMOVED)  04/17/2017    Computer-assisted tele-manipulated total hysterectomy, bilateral salpingo-oophorectomy with suture    TUBAL LIGATION  1984       Current Medications:     polyethylene glycol  17 g Per NG tube BID     sennosides-docusate sodium  2 tablet Per NG tube BID    oxyCODONE  15 mg Oral Q6H    balsum peru-castor oil   Topical BID    insulin lispro  0-4 Units SubCUTAneous Q6H    lidocaine-EPINEPHrine  20 mL IntraDERmal Once    epoetin shannon-epbx  10,000 Units SubCUTAneous Once per day on Tue Thu Sat    sodium chloride flush  5-40 mL IntraVENous 2 times per day    levothyroxine  50 mcg Oral Daily    [Held by provider] calcitRIOL  0.5 mcg Oral Q MWF    folic acid  1 mg Oral Daily    lansoprazole  30 mg Orogastric QAM AC    polyvinyl alcohol  1 drop Both Eyes Q4H    sodium chloride flush  5-40 mL IntraVENous 2 times per day    heparin (porcine)  5,000 Units SubCUTAneous 3 times per day       Allergies:  Patient has no known allergies.    Social History:    TOBACCO:    None   ETOH:    None   DRUGS:   None   MARITAL STATUS:         Family History:   No immunodeficiency    REVIEW OF SYSTEMS:    Unable to obtain    PHYSICAL EXAM:      Vitals:    /81   Pulse 77   Temp 99.4 °F (37.4 °C) (Axillary)   Resp 19   Ht 1.6 m (5' 3\")   Wt (!) 140 kg (308 lb 10.3 oz)   LMP  (LMP Unknown) Comment: unsure  SpO2 95%   BMI 54.67 kg/m²     GENERAL: Obtunded.  4L.  FT in place  HEENT: Membranes moist, no oral lesion  NECK:  Supple, no lymphadenopathy  LUNGS: Clear b/l, no rales, no dullness  CARDIAC: RRR, no murmur appreciated  ABD:  + BS, soft / NT  EXT:  R LE superficial ulceration, epideroloysis / separation - peeling off    No exudate.  No deep ulceration  NEURO: Unable to assess  LINES:  L PICC    DATA:    Lab Results   Component Value Date    WBC 36.7 (H) 06/28/2024    HGB 7.9 (L) 06/28/2024    HCT 24.8 (L) 06/28/2024    MCV 93.5 06/28/2024    PLT 64 (L) 06/28/2024     Lab Results   Component Value Date    CREATININE 3.4 (H) 06/28/2024    BUN 53 (H) 06/28/2024     06/28/2024    K 4.4 06/28/2024     06/28/2024    CO2 20 (L) 06/28/2024       Hepatic Function Panel:   Lab Results   Component Value Date/Time     MD Jannette

## 2024-06-28 NOTE — PLAN OF CARE
Problem: Safety - Adult  Goal: Free from fall injury  6/28/2024 0549 by Zoë Edge RN  Flowsheets (Taken 6/28/2024 0549)  Free From Fall Injury:   Instruct family/caregiver on patient safety   Based on caregiver fall risk screen, instruct family/caregiver to ask for assistance with transferring infant if caregiver noted to have fall risk factors  6/27/2024 1937 by Carmen Nunez RN  Outcome: Progressing     Problem: Discharge Planning  Goal: Discharge to home or other facility with appropriate resources  6/27/2024 1937 by Carmen Nunez, RN  Outcome: Progressing  Flowsheets (Taken 6/27/2024 1600)  Discharge to home or other facility with appropriate resources:   Arrange for needed discharge resources and transportation as appropriate   Identify discharge learning needs (meds, wound care, etc)   Arrange for interpreters to assist at discharge as needed     Problem: Safety - Medical Restraint  Goal: Remains free of injury from restraints (Restraint for Interference with Medical Device)  Description: INTERVENTIONS:  1. Determine that other, less restrictive measures have been tried or would not be effective before applying the restraint  2. Evaluate the patient's condition at the time of restraint application  3. Inform patient/family regarding the reason for restraint  4. Q2H: Monitor safety, psychosocial status, comfort, nutrition and hydration  6/28/2024 0549 by Zoë Edge, RN  Flowsheets  Taken 6/28/2024 0400  Remains free of injury from restraints (restraint for interference with medical device):   Determine that other, less restrictive measures have been tried or would not be effective before applying the restraint   Evaluate the patient's condition at the time of restraint application   Inform patient/family regarding the reason for restraint   Every 2 hours: Monitor safety, psychosocial status, comfort, nutrition and hydration  Taken 6/28/2024 0200  Remains free of injury from  injury from restraints (restraint for interference with medical device):   Determine that other, less restrictive measures have been tried or would not be effective before applying the restraint   Evaluate the patient's condition at the time of restraint application   Inform patient/family regarding the reason for restraint   Every 2 hours: Monitor safety, psychosocial status, comfort, nutrition and hydration  Taken 6/27/2024 0600 by Melchor Palafox RN  Remains free of injury from restraints (restraint for interference with medical device): Determine that other, less restrictive measures have been tried or would not be effective before applying the restraint

## 2024-06-28 NOTE — DIALYSIS
Treatment time: 2.5 hours  Net UF: 2000 ml     Pre weight: 140.5  kg  Post weight:138.5 kg  EDW: TBD kg  Crit Line Used: Yes Ending Profile: B  Refill Present: No     Access used: R Vascath    Access function: well with  ml/min     Medications or blood products given: Heparin Dwells     Regular outpatient schedule: [facility] today     Summary of response to treatment: Patient tolerated treatment well and without any complications. Patient remained stable throughout entire treatment and upon the exiting the dialysis suite via transport.     Report given to RUPERT Burrell and copy of dialysis treatment record placed in chart, to be scanned into EMR.

## 2024-06-28 NOTE — CARE COORDINATION
Case Management Assessment           Daily Note                 Date/ Time of Note: 6/28/2024 10:14 AM         Note completed by: Debby Isabel RN    Patient Name: Marcela Colby  YOB: 1960    Diagnosis:Cardiac arrest (HCC) [I46.9]  Septicemia (HCC) [A41.9]  Cellulitis of right lower extremity [L03.115]  Acute renal failure, unspecified acute renal failure type (HCC) [N17.9]  Patient Admission Status: Inpatient  Date of Admission:6/21/2024  4:12 PM    Length of Stay: 7 GLOS: GMLOS: 5.1 Readmission Risk Score: Readmission Risk Score: 20.1    Current Plan of Care: Extubated 6/27. O2 4L/NC Still agitated on Precedex and Fentanyl. Pain management. CRRT DC'd. ID consulted  IV ABX Wound care    PT AM-PAC:   / 24 per last evaluation on: will need to be assessed    OT AM-PAC:   / 24 per last evaluation on: will need to be assessed    Discharge Plan: Return to [facility]. Bed on hold per Kelley. No pre-cert to return    COVID Result:  No results found for: \"COVID19\"    Transportation PLAN for discharge: EMS transportation      Marcela and her family were provided with choice of provider; she and her family are in agreement with the discharge plan.    Emergency Contacts:  Extended Emergency Contact Information  Primary Emergency Contact: Liza Quiles  Home Phone: [phone]  Relation: Legal Guardian  Secondary Emergency Contact: Kristi Colby  Home Phone: [phone]  Relation: Child    Care Transition Patient: No    IMM Status:      Debby Isabel RN  [facility]  Case Management Department  [phone]

## 2024-06-29 ENCOUNTER — APPOINTMENT (OUTPATIENT)
Dept: GENERAL RADIOLOGY | Age: 64
DRG: 870 | End: 2024-06-29
Payer: MEDICARE

## 2024-06-29 PROBLEM — I10 PRIMARY HYPERTENSION: Status: ACTIVE | Noted: 2024-06-29

## 2024-06-29 PROBLEM — I21.4 NSTEMI (NON-ST ELEVATED MYOCARDIAL INFARCTION) (HCC): Status: ACTIVE | Noted: 2024-06-29

## 2024-06-29 PROBLEM — E78.2 MIXED HYPERLIPIDEMIA: Status: ACTIVE | Noted: 2024-06-29

## 2024-06-29 PROBLEM — I50.20 HFREF (HEART FAILURE WITH REDUCED EJECTION FRACTION) (HCC): Status: ACTIVE | Noted: 2024-06-29

## 2024-06-29 LAB
ALBUMIN SERPL-MCNC: 2.5 G/DL (ref 3.4–5)
ALP SERPL-CCNC: 291 U/L (ref 40–129)
ALT SERPL-CCNC: 107 U/L (ref 10–40)
ANION GAP SERPL CALCULATED.3IONS-SCNC: 13 MMOL/L (ref 3–16)
ANISOCYTOSIS BLD QL SMEAR: ABNORMAL
AST SERPL-CCNC: 25 U/L (ref 15–37)
BASOPHILS # BLD: 0 K/UL (ref 0–0.2)
BASOPHILS NFR BLD: 0 %
BILIRUB DIRECT SERPL-MCNC: 0.3 MG/DL (ref 0–0.3)
BILIRUB INDIRECT SERPL-MCNC: 0.1 MG/DL (ref 0–1)
BILIRUB SERPL-MCNC: 0.4 MG/DL (ref 0–1)
BUN SERPL-MCNC: 64 MG/DL (ref 7–20)
CALCIUM SERPL-MCNC: 8.8 MG/DL (ref 8.3–10.6)
CHLORIDE SERPL-SCNC: 102 MMOL/L (ref 99–110)
CO2 SERPL-SCNC: 24 MMOL/L (ref 21–32)
CREAT SERPL-MCNC: 3.7 MG/DL (ref 0.6–1.2)
DACRYOCYTES BLD QL SMEAR: ABNORMAL
DEPRECATED RDW RBC AUTO: 16.3 % (ref 12.4–15.4)
EKG ATRIAL RATE: 89 BPM
EKG ATRIAL RATE: 91 BPM
EKG DIAGNOSIS: NORMAL
EKG DIAGNOSIS: NORMAL
EKG P AXIS: 55 DEGREES
EKG P-R INTERVAL: 202 MS
EKG Q-T INTERVAL: 358 MS
EKG Q-T INTERVAL: 370 MS
EKG QRS DURATION: 94 MS
EKG QRS DURATION: 94 MS
EKG QTC CALCULATION (BAZETT): 440 MS
EKG QTC CALCULATION (BAZETT): 482 MS
EKG R AXIS: 41 DEGREES
EKG R AXIS: 53 DEGREES
EKG T AXIS: 31 DEGREES
EKG T AXIS: 49 DEGREES
EKG VENTRICULAR RATE: 102 BPM
EKG VENTRICULAR RATE: 91 BPM
EOSINOPHIL # BLD: 0 K/UL (ref 0–0.6)
EOSINOPHIL NFR BLD: 0 %
GFR SERPLBLD CREATININE-BSD FMLA CKD-EPI: 13 ML/MIN/{1.73_M2}
GLUCOSE BLD-MCNC: 140 MG/DL (ref 70–99)
GLUCOSE BLD-MCNC: 142 MG/DL (ref 70–99)
GLUCOSE SERPL-MCNC: 141 MG/DL (ref 70–99)
HCT VFR BLD AUTO: 23.8 % (ref 36–48)
HGB BLD-MCNC: 7.6 G/DL (ref 12–16)
LYMPHOCYTES # BLD: 2.1 K/UL (ref 1–5.1)
LYMPHOCYTES NFR BLD: 7 %
MACROCYTES BLD QL SMEAR: ABNORMAL
MAGNESIUM SERPL-MCNC: 2.5 MG/DL (ref 1.8–2.4)
MCH RBC QN AUTO: 30.4 PG (ref 26–34)
MCHC RBC AUTO-ENTMCNC: 32 G/DL (ref 31–36)
MCV RBC AUTO: 95 FL (ref 80–100)
MONOCYTES # BLD: 0 K/UL (ref 0–1.3)
MONOCYTES NFR BLD: 0 %
NEUTROPHILS # BLD: 27.5 K/UL (ref 1.7–7.7)
NEUTROPHILS NFR BLD: 93 %
PERFORMED ON: ABNORMAL
PERFORMED ON: ABNORMAL
PHOSPHATE SERPL-MCNC: 4.3 MG/DL (ref 2.5–4.9)
PLATELET # BLD AUTO: 77 K/UL (ref 135–450)
PLATELET BLD QL SMEAR: ABNORMAL
PMV BLD AUTO: 10.1 FL (ref 5–10.5)
POLYCHROMASIA BLD QL SMEAR: ABNORMAL
POTASSIUM SERPL-SCNC: 4 MMOL/L (ref 3.5–5.1)
PROT SERPL-MCNC: 5.9 G/DL (ref 6.4–8.2)
RBC # BLD AUTO: 2.51 M/UL (ref 4–5.2)
SODIUM SERPL-SCNC: 139 MMOL/L (ref 136–145)
STOMATOCYTES BLD QL SMEAR: ABNORMAL
WBC # BLD AUTO: 29.6 K/UL (ref 4–11)

## 2024-06-29 PROCEDURE — 6360000002 HC RX W HCPCS

## 2024-06-29 PROCEDURE — 99233 SBSQ HOSP IP/OBS HIGH 50: CPT | Performed by: INTERNAL MEDICINE

## 2024-06-29 PROCEDURE — 85025 COMPLETE CBC W/AUTO DIFF WBC: CPT

## 2024-06-29 PROCEDURE — 6370000000 HC RX 637 (ALT 250 FOR IP)

## 2024-06-29 PROCEDURE — 36415 COLL VENOUS BLD VENIPUNCTURE: CPT

## 2024-06-29 PROCEDURE — 6360000002 HC RX W HCPCS: Performed by: INTERNAL MEDICINE

## 2024-06-29 PROCEDURE — 2580000003 HC RX 258

## 2024-06-29 PROCEDURE — 80076 HEPATIC FUNCTION PANEL: CPT

## 2024-06-29 PROCEDURE — 83735 ASSAY OF MAGNESIUM: CPT

## 2024-06-29 PROCEDURE — 80069 RENAL FUNCTION PANEL: CPT

## 2024-06-29 PROCEDURE — 2700000000 HC OXYGEN THERAPY PER DAY

## 2024-06-29 PROCEDURE — 74018 RADEX ABDOMEN 1 VIEW: CPT

## 2024-06-29 PROCEDURE — 99233 SBSQ HOSP IP/OBS HIGH 50: CPT | Performed by: NURSE PRACTITIONER

## 2024-06-29 PROCEDURE — 2000000000 HC ICU R&B

## 2024-06-29 PROCEDURE — 94761 N-INVAS EAR/PLS OXIMETRY MLT: CPT

## 2024-06-29 RX ORDER — FUROSEMIDE 10 MG/ML
140 INJECTION INTRAMUSCULAR; INTRAVENOUS ONCE
Status: COMPLETED | OUTPATIENT
Start: 2024-06-29 | End: 2024-06-29

## 2024-06-29 RX ORDER — OXYCODONE HYDROCHLORIDE 5 MG/1
10 TABLET ORAL EVERY 6 HOURS
Status: DISCONTINUED | OUTPATIENT
Start: 2024-06-29 | End: 2024-06-30 | Stop reason: HOSPADM

## 2024-06-29 RX ADMIN — LEVOTHYROXINE SODIUM 50 MCG: 50 TABLET ORAL at 06:15

## 2024-06-29 RX ADMIN — EPOETIN ALFA-EPBX 10000 UNITS: 10000 INJECTION, SOLUTION INTRAVENOUS; SUBCUTANEOUS at 18:11

## 2024-06-29 RX ADMIN — SODIUM CHLORIDE, PRESERVATIVE FREE 10 ML: 5 INJECTION INTRAVENOUS at 08:22

## 2024-06-29 RX ADMIN — OXYCODONE 10 MG: 5 TABLET ORAL at 18:07

## 2024-06-29 RX ADMIN — POLYVINYL ALCOHOL 1 DROP: 1.4 SOLUTION/ DROPS OPHTHALMIC at 01:17

## 2024-06-29 RX ADMIN — OXYCODONE 10 MG: 5 TABLET ORAL at 11:41

## 2024-06-29 RX ADMIN — DOCUSATE SODIUM 50 MG AND SENNOSIDES 8.6 MG 2 TABLET: 8.6; 5 TABLET, FILM COATED ORAL at 08:21

## 2024-06-29 RX ADMIN — FUROSEMIDE 140 MG: 10 INJECTION, SOLUTION INTRAMUSCULAR; INTRAVENOUS at 18:07

## 2024-06-29 RX ADMIN — POLYVINYL ALCOHOL 1 DROP: 1.4 SOLUTION/ DROPS OPHTHALMIC at 03:46

## 2024-06-29 RX ADMIN — POLYVINYL ALCOHOL 1 DROP: 1.4 SOLUTION/ DROPS OPHTHALMIC at 15:05

## 2024-06-29 RX ADMIN — HEPARIN SODIUM 5000 UNITS: 5000 INJECTION INTRAVENOUS; SUBCUTANEOUS at 14:43

## 2024-06-29 RX ADMIN — LANSOPRAZOLE 30 MG: 30 TABLET, ORALLY DISINTEGRATING, DELAYED RELEASE ORAL at 06:15

## 2024-06-29 RX ADMIN — OXYCODONE 15 MG: 15 TABLET ORAL at 06:15

## 2024-06-29 RX ADMIN — HEPARIN SODIUM 5000 UNITS: 5000 INJECTION INTRAVENOUS; SUBCUTANEOUS at 06:16

## 2024-06-29 RX ADMIN — OXYCODONE 15 MG: 15 TABLET ORAL at 01:17

## 2024-06-29 RX ADMIN — Medication: at 08:21

## 2024-06-29 RX ADMIN — POLYVINYL ALCOHOL 1 DROP: 1.4 SOLUTION/ DROPS OPHTHALMIC at 20:04

## 2024-06-29 RX ADMIN — POLYETHYLENE GLYCOL 3350 17 G: 17 POWDER, FOR SOLUTION ORAL at 08:21

## 2024-06-29 RX ADMIN — POLYVINYL ALCOHOL 1 DROP: 1.4 SOLUTION/ DROPS OPHTHALMIC at 08:21

## 2024-06-29 RX ADMIN — FOLIC ACID 1 MG: 1 TABLET ORAL at 08:21

## 2024-06-29 ASSESSMENT — PAIN SCALES - GENERAL
PAINLEVEL_OUTOF10: 4
PAINLEVEL_OUTOF10: 0
PAINLEVEL_OUTOF10: 6

## 2024-06-29 NOTE — PROGRESS NOTES
V2.0    [facility] [place] Progress Note      Name:  Marcela Colby /Age/Sex: 1960  (64 y.o. female)   MRN & CSN:  [id number] & [id number] Encounter Date/Time: 2024 9:13 AM EDT   Location:  [facility]4/4514-01 PCP: Thalia Au MD     Attending:Abhishek Ham MD       Hospital Day: 9    Assessment and Recommendations   Marcela Colby is a 64 y.o. female with pmh of CKD stage V, hyperlipidemia, hypertension, dementia, pancytopenia, hypothyroidism who presents with Cardiac arrest (HCC).  Patient was sent for right lower leg pain and swelling.  Had a Doppler which was negative.  Patient came back to the ED with dyspnea shortness of breath.  Patient was placed on BiPAP and was febrile.  Patient then went into PEA cardiac arrest.  RCS was after 20 minutes.  Patient was intubated on admission.  She has septic shock secondary to pneumonia with volume overload LUIS on CKD.  Started on CRRT and pressors.  Pneumonia panel positive for Moraxella H influenza.  Urine for strep pneumonia positive      Plan:     Cardiac arrest  Acute encephalopathy  -PEA cardiac arrest, cause of arrest unclear  -reported ~20 minutes until ROSC  -wean sedation as able. Monitor neuro status. Depending on clinical course may warrant repeat brain imaging    Acute respiratory Failure with hypoxia, improving  Acute Pneumonia  - Pulm/Crit following, extubated , wean oxygen as tolerated  - Completed abx course    SJS of RLE  Leukocytosis, worsening  - Vascular surgery consulted  - Plastic surgery consulted and following, performed punch biopsy, dermatopathology returned and c/w SJS, unclear etiology  - Continue wound care   - Completed abx course earlier in hospitalization. ID evaluated patient  and do not recommend further abx at this time as no evidence of new infection    ARF superimposed on CKD V  Nephrology consulted  HD per Nephro  Monitor UOP and renal function labs    Other issues    Shock, suspect septic +/- cardiogenic etiologies,  findings are likely related to motion in this region correlate clinically. No evidence of any gas within the soft tissues.     1.   Limited exam patient motion. 2.  Diffuse soft tissue swelling. Electronically signed by Arielle Menon    XR CHEST PORTABLE    Result Date: 6/21/2024  EXAM: XR CHEST PORTABLE INDICATION: altered mental status COMPARISON: CT dated 6/18/2020 FINDINGS: Medical Devices: None. Lungs: Bilateral hilar opacities Pleura: No pneumothorax or pleural effusion. Heart and Mediastinum: The cardiomediastinal silhouette is within normal limits. Bones: No acute suspicious abnormality.     Bilateral infrahilar atelectasis or pneumonia. Electronically signed by Zenon High    Vascular duplex lower extremity venous right    Result Date: 6/21/2024    No evidence of deep vein and superficial thrombosis in the right lower extremity.     XR TIBIA FIBULA RIGHT (2 VIEWS)    Result Date: 6/21/2024  EXAM: XR TIBIA FIBULA RIGHT (2 VIEWS). DATE: 6/21/2024 5:36 AM. INDICATION: leg pain COMPARISON: None TECHNIQUE: Standard per department protocol. VIEWS OBTAINED: 4 FINDINGS: No acute fracture or dislocation/subluxation. Moderate osteoarthrosis of the knee. The soft tissues are normal.     No acute osseous abnormality. Electronically signed by Jeremy Monroe    XR FOOT RIGHT (MIN 3 VIEWS)    Result Date: 6/21/2024  EXAM: XR FOOT RIGHT (MIN 3 VIEWS). DATE: 6/21/2024 5:36 AM. INDICATION: foot pain COMPARISON: None TECHNIQUE: Standard per department protocol. VIEWS OBTAINED: 4 FINDINGS: No acute fracture or dislocation/subluxation. Plantar calcaneal spur. Joint spaces intact. Dorsal forefoot soft tissue swelling.     No acute osseous abnormality. Dorsal forefoot soft tissue swelling. Electronically signed by Jeremy Monroe      CBC:   Recent Labs     06/27/24  0603 06/28/24  0448 06/29/24  0425   WBC 32.2* 36.7* 29.6*   HGB 8.3* 7.9* 7.6*   PLT 58* 64* 77*     BMP:    Recent Labs     06/27/24  0603

## 2024-06-29 NOTE — PROGRESS NOTES
[facility]  General Cardiology Progress Note  CC/HPI:     S:Resting, appears comfortable. No events overnight. extubated yesterday.     Tele: Sinus     O:  Physical Exam:  BP (!) 159/58   Pulse 86   Temp 98.1 °F (36.7 °C) (Axillary)   Resp 18   Ht 1.6 m (5' 3\")   Wt (!) 138.9 kg (306 lb 3.5 oz)   LMP  (LMP Unknown) Comment: unsure  SpO2 95%   BMI 54.24 kg/m²    General (appearance):  No acute distress  Eyes: anicteric   Neck: soft, No JVD  Ears/Nose/Mouth/Thorat: No cyanosis. NG tube   CV: RRR   Respiratory:  normal effort  GI: soft, non-tender, non-distended  Skin: Warm, dry. No rashes  Neuro/Psych:   Ext:  No c/c. + edema  Pulses:  2+ radial     I.O's=     Weight  Admission: Weight - Scale: (!) 141.7 kg (312 lb 6.3 oz)   Today: Weight - Scale: (!) 138.9 kg (306 lb 3.5 oz)    CBC:   Recent Labs     24  0603 24  0448 24  0425   WBC 32.2* 36.7* 29.6*   HGB 8.3* 7.9* 7.6*   HCT 25.8* 24.8* 23.8*   MCV 92.8 93.5 95.0   PLT 58* 64* 77*     BMP:   Recent Labs     24  0603 24  0448 24  0425    141 139   K 4.0 4.4 4.0    105 102   CO2 21 20* 24   PHOS 2.6 5.0* 4.3   BUN 19 53* 64*   CREATININE 1.7* 3.4* 3.7*     Estimated Creatinine Clearance: 21 mL/min (A) (based on SCr of 3.7 mg/dL (H)).    Mag:   Lab Results   Component Value Date/Time    MG 2.50 2024 04:25 AM     LIVER PROFILE:   Recent Labs     24  1746 24  0603 24  0425   AST 51* 37 25   * 228* 107*   BILIDIR  --   --  0.3   BILITOT 1.4* 1.0 0.4   ALKPHOS 146* 205* 291*     PT/INR:   Recent Labs     24   PROTIME 16.1*   INR 1.27*     Pro-BNP:   Lab Results   Component Value Date/Time    PROBNP 13,629 2024 04:19 PM     High Sensitivity Troponin:   Lab Results   Component Value Date    TROPHS 410 (H) 2024       Imagin2024 Echo    Image quality is technically difficult. Contrast used: Definity. Technically difficult study due to patient's  body habitus and procedure performed with the patient in a supine position.    Left Ventricle: Moderately reduced left ventricular systolic function with a visually estimated EF of 35 - 40%. Left ventricle size is normal. Normal wall thickness. Mild global hypokinesis present.    Right Ventricle: Not well visualized. Right ventricle size is normal.    Mitral Valve: Not well visualized. Mild regurgitation.    Tricuspid Valve: Not well visualized. Mild regurgitation.    Assessment:  Septic shock  Cardiac arrest  NSTEMI  HFrEF  LUIS on CKD  Acute liver failure  Pneumonia  HTN    Plan:  -Keep K>4, Mg>2.  -Extubated and off pressors and off CRRT    Previously on Norvasc, Lipitor, labetalol and torsemide. Will monitor and restart meds as needed.       Please call the nurse navigator at [phone] during the weekdays  Please call the office at [phone] after hours and weekends.

## 2024-06-29 NOTE — PROGRESS NOTES
ICU Progress Note    Admit Date: 6/21/2024  Hospital Day: 8  ICU Day: 7d 9h  Vent Day: Extubated 06/27  IV Access: Peripheral, PICC  IV Fluids: None  Vasopressors: None  Antibiotics: S/p Vancomycin, Zosyn (06/21 - 06/27)  Diet: Diet NPO  ADULT TUBE FEEDING VOLUME BASED; Orogastric; Renal Formula; 30; Continuous; 1,320; 24  PCP: Thalia Au MD  Code Status: Full Code    CC: Respiratory Distress (Pt brought in by EMS from [facility] for respiratory distress, CIPAP placed en route )    Interval history:  Briefly, Ms. Marcela Colby is a 64 y.o. female with a MHx significant for CKD V, chronic anemia, HTN, hypothyroidism, history of DVT and PE who presented with respiratory distress and had PEA arrest on presentation with ROSC in 30 mins. Admitted to ICU for septic vs cardiogenic shock. Now off pressors and sedation and extubated 6/27.    NAOE. Pt appears comfortable on exam and now on oxy 10mg q6h. Vitals are stable. She is on 3L nc. Overall hemodynamically stable. More alert this morning, remains encephalopathic.     UOP 0 mL. Had HD session 6/28 with 2L UF.    Medications:     Scheduled Meds:    polyethylene glycol  17 g Per NG tube BID    sennosides-docusate sodium  2 tablet Per NG tube BID    oxyCODONE  15 mg Oral Q6H    balsum peru-castor oil   Topical BID    insulin lispro  0-4 Units SubCUTAneous Q6H    epoetin shannon-epbx  10,000 Units SubCUTAneous Once per day on Tue Thu Sat    sodium chloride flush  5-40 mL IntraVENous 2 times per day    levothyroxine  50 mcg Oral Daily    [Held by provider] calcitRIOL  0.5 mcg Oral Q MWF    folic acid  1 mg Oral Daily    lansoprazole  30 mg Orogastric QAM AC    polyvinyl alcohol  1 drop Both Eyes Q4H    sodium chloride flush  5-40 mL IntraVENous 2 times per day    heparin (porcine)  5,000 Units SubCUTAneous 3 times per day     Continuous Infusions:    dextrose      sodium chloride      dexmedeTOMIDine (PRECEDEX) 400 mcg in sodium chloride 0.9 % 100 mL infusion Stopped (06/28/24  assess RV systolic function.  Does not appear to be in acute exacerbation.  - Cardiology consulted, appreciate recs  - Treatment per above      FEN/GI  Acute liver failure 2/2 ischemia; improving  LFTs elevated, INR elevated to 2.8 post-arrest. LFTs now down trending after peaking at low 1k.   - Continue to trend hepatic function panel  Continue TF per RD.    Constipation  Likely 2/2 opioids.   - Senna, glycolax on board  - Movantik added today      Renal   LUIS on CKD V  Baseline GFR around 11, now at 6-7 range post-arrest. Follows with nephrology, Dr. Box ([facility]).  This morning BUN 53, Cr 3.4, GFR 14  - Kidney numbers improved with CRRT, now worsening  - Nephrology consulted, appreciate recs; now off CRRT. Will start HD today per nephro  - Avoid nephrotoxins  - Renally dose medications  - MAP goal > 65  - Strict I/Os        No acute issues at this time.      Heme/Onc  Acute leukopenia; resolved  Acute leukocytosis; worsening  Suspect 2/2 sepsis vs inflammatory process with SJS    Acute thrombocytopenia  PLT 58 < 54 < 64 < 73 < 100 < 146 < 211    Chronic normocytic anemia  D/t chronic renal failure  Hgb stable compared to her baseline  - Daily CBC  - Retacrit TTS      ID  Possible septic shock; improved  Possible RLE cellulitis  Unclear etiology, possibly CAP vs. Lower extremity cellulitis. F flu, strep pneumo and Moraxella positive. MRSA neg.   - BC 06/26 x2 NGTD  - Discontinued vancomycin, Zosyn (06/21 - 06/27)  - No need for abx per ID      Endo/Rheum  Hypothyroidism  - Continue home Synthroid 50 mcg daily      Derm/MSK  RLE Elmore-Endy Syndrome  Concern for RLE cellulitis  Unclear on etiology for SJS, she isn't on any typical causative medications. Surgery has been on board. Limited resources at this facility. Appropriate to transfer patient.   - Infectious disease consulted by surgical team, appreciate recs  - S/p punch biopsy 06/25, results suggesting SJS  - 06/27 RLE wound culture with  NGTD  - Continue PO oxycodone 10 mg Q6H with PRN Dilaudid for pain control  - Coordinating with  burn team for wound care - will initiate transfer         Code Status: Full Code   Diet: Diet NPO  ADULT TUBE FEEDING VOLUME BASED; Orogastric; Renal Formula; 30; Continuous; 1,320; 24   DVT PPx: SubQ heparin  DISPO: ICU      Dotty Storm DO  Internal Medicine, PGY-1  Contact via [employer]  6/29/2024, 7:28 AM    This patient has been staffed and discussed with Dr. Jimenez.    Patient examined, findings as discussed with Dr. Storm.  Agree with assessment and plan.  Postcardiac arrest, cardiac and pulmonary function recovering.  No cardiac arrhythmias.  Hemodynamically stable off vasopressors.  Requires O2 at 4 L/min or less to maintain adequate gas exchange.  Encephalopathy is resolving, she is slightly more interactive and has not been demonstrating agitation.  Decreasing scheduled pain medication, given per NGT.  LUIS on CKD unchanged.  Requiring periodic hemodialysis.  Elmore Endy syndrome involving right LE not significantly changed.  She has large areas of epidermal loss.  Plan transfer to [facility] for continued care of this particular problem.

## 2024-06-29 NOTE — PROGRESS NOTES
[facility] Progress Note    CC: LUIS on CKD 4    64 y.o. year old female who we are seeing in consultation for acute kidney injury.  Patient has a history of chronic kidney disease stage IV.  Had PEA arrest on presentation.         Subjective/     Remains off CRRT since night of 6/26/24  On 5L  Noted plans for transfer to burn center   Has ngt , on TF.   LE Skin bx SJS   Off pressors.   No UOP.   Not on sedation.   BP stable     ROS: unable    No visitors    Objective/   GEN:  Chronically ill, /60   Pulse 83   Temp 97.2 °F (36.2 °C) (Axillary)   Resp 17   Ht 1.6 m (5' 3\")   Wt (!) 138.9 kg (306 lb 3.5 oz)   LMP  (LMP Unknown) Comment: unsure  SpO2 94%   BMI 54.24 kg/m²     GEN: morbidly obese, on NC   HEENT: non-icteric,  NGT   NECK: no JVD  CV: S1, S2 without m/r/g  RESP: diminished BS bibasilar  ABD: Soft, NT  SKIN: warm,   EXT: peripheral and dependent edema LLE and back; RLE with Desquamation lesions RLE  NEURO: not following commands     Data/  CBC:   Recent Labs     06/27/24  0603 06/28/24 0448 06/29/24  0425   WBC 32.2* 36.7* 29.6*   RBC 2.78* 2.65* 2.51*   HGB 8.3* 7.9* 7.6*   HCT 25.8* 24.8* 23.8*   PLT 58* 64* 77*       BMP:    Recent Labs     06/27/24  0603 06/28/24 0448 06/29/24  0425    141 139   K 4.0 4.4 4.0    105 102   CO2 21 20* 24   BUN 19 53* 64*   CREATININE 1.7* 3.4* 3.7*   CALCIUM 8.9 9.1 8.8   GLUCOSE 135* 104* 141*       Phosphorus:    Recent Labs     06/27/24  0603 06/28/24 0448 06/29/24  0425   PHOS 2.6 5.0* 4.3       Magnesium:    Recent Labs     06/27/24  0603 06/28/24 0448 06/29/24  0425   MG 2.40 2.80* 2.50*       Hepatic Function Panel:    Lab Results   Component Value Date/Time    ALKPHOS 291 06/29/2024 04:25 AM     06/29/2024 04:25 AM    AST 25 06/29/2024 04:25 AM    PROT 7.9 06/15/2017 12:12 PM    BILITOT 0.4 06/29/2024 04:25 AM    BILIDIR 0.3 06/29/2024 04:25 AM    IBILI 0.1 06/29/2024 04:25 AM             Assessment/      Anuric Acute kidney injury stage III likely ischemic ATN in the setting of prolonged PEA arrest  Patient had previously expressed she would not want dialysis  Discussions with Dr Gutierrez and legal guardian: decided to proceed with CRRT  CRRT stopped 6/26 night  Off pressors  Acid/base/lytes stable  BUN trending up.   - trial high dose lasix    - pharmacy to dose medicines with anuric LUIS.     Chronic kidney disease stage V secondary to hypertensive nephrosclerosis   creatinine baseline around 4.7 mg/dL.    Follows with Dr. Box  -Per previous documentation patient is not interested in long term renal replacement therapy.    S/p PEA arrest   requiring several rounds of CPR    Acute hypoxic respiratory failure  Extubated 6/27    Shock  Sepsis and post cardiac arrest  Off pressors   RLE showing changes of desquamation- SJS on bx     Desquamation RLE/SJS   Seen by Vasc Surgery with reasonable perfusion  Seen by Plastic Surgery  Skin biopsy done on 6/25 - SJS . Management per surgery     Shock liver  Transaminases improved    Electrolytes  Acidosis improved post CRRT     Anemia  - on LISA mwf    - hb below goal     Prognosis limited if not poor  - Palliative Care involved  - full support at present        ____________________________________  Hernando Villavicencio MD  [facility]  [email or web address]  Office: [phone]

## 2024-06-29 NOTE — PLAN OF CARE
Problem: Safety - Adult  Goal: Free from fall injury  6/29/2024 1205 by Whit Castellanos RN  Outcome: Progressing  6/29/2024 0644 by Zoë Edge RN  Outcome: Progressing  Flowsheets (Taken 6/29/2024 0644)  Free From Fall Injury:   Instruct family/caregiver on patient safety   Based on caregiver fall risk screen, instruct family/caregiver to ask for assistance with transferring infant if caregiver noted to have fall risk factors     Problem: Pain  Goal: Verbalizes/displays adequate comfort level or baseline comfort level  6/29/2024 1205 by Whit Castellanos RN  Outcome: Progressing  6/29/2024 0644 by Zoë Edge RN  Outcome: Progressing     Problem: Skin/Tissue Integrity  Goal: Absence of new skin breakdown  Description: 1.  Monitor for areas of redness and/or skin breakdown  2.  Assess vascular access sites hourly  3.  Every 4-6 hours minimum:  Change oxygen saturation probe site  4.  Every 4-6 hours:  If on nasal continuous positive airway pressure, respiratory therapy assess nares and determine need for appliance change or resting period.  6/29/2024 1205 by Whit Castellanos RN  Outcome: Progressing  6/29/2024 0644 by Zoë Edge RN  Outcome: Progressing     Problem: ABCDS Injury Assessment  Goal: Absence of physical injury  6/29/2024 0644 by Zoë Edge RN  Outcome: Progressing     Problem: Respiratory - Adult  Goal: Achieves optimal ventilation and oxygenation  6/29/2024 1205 by Whit Castellanos RN  Outcome: Progressing  6/29/2024 0644 by Zoë Edge RN  Outcome: Progressing  Flowsheets (Taken 6/28/2024 0549)  Achieves optimal ventilation and oxygenation:   Assess for changes in respiratory status   Assess for changes in mentation and behavior   Position to facilitate oxygenation and minimize respiratory effort   Oxygen supplementation based on oxygen saturation or arterial blood gases   Initiate smoking cessation protocol as indicated   Encourage broncho-pulmonary hygiene  including cough, deep breathe, incentive spirometry   Assess the need for suctioning and aspirate as needed   Assess and instruct to report shortness of breath or any respiratory difficulty   Respiratory therapy support as indicated     Problem: Cardiovascular - Adult  Goal: Maintains optimal cardiac output and hemodynamic stability  6/29/2024 0644 by Zoë Edge RN  Outcome: Progressing  Goal: Absence of cardiac dysrhythmias or at baseline  6/29/2024 0644 by Zoë Edge RN  Outcome: Progressing     Problem: Nutrition Deficit:  Goal: Optimize nutritional status  6/29/2024 1205 by Whit Castellanos RN  Outcome: Progressing  6/29/2024 0644 by Zoë Edge RN  Outcome: Progressing     Problem: Discharge Planning  Goal: Discharge to home or other facility with appropriate resources  6/29/2024 1205 by Whit Castellanos RN  Outcome: Not Progressing  6/29/2024 0644 by Zoë Edge RN  Outcome: Progressing

## 2024-06-29 NOTE — PLAN OF CARE
Problem: Safety - Adult  Goal: Free from fall injury  Outcome: Progressing  Flowsheets (Taken 6/29/2024 0644)  Free From Fall Injury:   Instruct family/caregiver on patient safety   Based on caregiver fall risk screen, instruct family/caregiver to ask for assistance with transferring infant if caregiver noted to have fall risk factors     Problem: Safety - Medical Restraint  Goal: Remains free of injury from restraints (Restraint for Interference with Medical Device)  Description: INTERVENTIONS:  1. Determine that other, less restrictive measures have been tried or would not be effective before applying the restraint  2. Evaluate the patient's condition at the time of restraint application  3. Inform patient/family regarding the reason for restraint  4. Q2H: Monitor safety, psychosocial status, comfort, nutrition and hydration  Outcome: Progressing  Flowsheets (Taken 6/28/2024 0600)  Remains free of injury from restraints (restraint for interference with medical device):   Determine that other, less restrictive measures have been tried or would not be effective before applying the restraint   Evaluate the patient's condition at the time of restraint application   Inform patient/family regarding the reason for restraint   Every 2 hours: Monitor safety, psychosocial status, comfort, nutrition and hydration     Problem: Respiratory - Adult  Goal: Achieves optimal ventilation and oxygenation  Outcome: Progressing  Flowsheets (Taken 6/28/2024 0549)  Achieves optimal ventilation and oxygenation:   Assess for changes in respiratory status   Assess for changes in mentation and behavior   Position to facilitate oxygenation and minimize respiratory effort   Oxygen supplementation based on oxygen saturation or arterial blood gases   Initiate smoking cessation protocol as indicated   Encourage broncho-pulmonary hygiene including cough, deep breathe, incentive spirometry   Assess the need for suctioning and aspirate as

## 2024-06-29 NOTE — PLAN OF CARE
Transfer initiated     Phone call 3:00 PM     Reached out to transfer center to be put in contact with Dr. Vito Ace MD at  [facility]. 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 Surgery at [facility] was in contact with  [facility] on advise for wound care. Pt with extensive areas of skin desquamation and sloughing. Given insufficient resources to address the SJS of lower extremity at our facility, would like to transfer patient so that  [facility] team can follow patient in house at [facility]. Intensivist agreeable to have patient transferred. Will have transfer center arrange transport for patient. She is currently off sedation and pressors. Requiring 4L nc.       Dotty Storm, DO  Internal Medicine, PGY-1  06/29/24      ------ UPDATE  Pt's legal guarding Liza Quiles and daughter Kristi are aware of transfer and in agreement.

## 2024-06-29 NOTE — PROGRESS NOTES
Plastic Surgery   Daily Progress Note  Patient: Marcela Colby      CC: concern for SJS    SUBJECTIVE:   Stable mental status. Still becoming extremely agitated even on high doses of Precedex and fentanyl     ROS:   A 14 point review of systems was conducted, significant findings as noted above. All other systems negative.    OBJECTIVE:    PHYSICAL EXAM:    Vitals:    06/29/24 0600 06/29/24 0645 06/29/24 0700 06/29/24 0800   BP: 138/78  (!) 161/66 (!) 159/58   Pulse: 94  81 86   Resp: 17 16 17 18   Temp:    98.1 °F (36.7 °C)   TempSrc:    Axillary   SpO2:   92% 95%   Weight: (!) 138.9 kg (306 lb 3.5 oz)      Height:           General appearance: intubated, sedated  Eyes: No scleral icterus, EOM grossly intact  Neck: Trachea midline, no JVD  Chest/Lungs:  on 4 L NC  Cardiovascular: RRR  Skin: Areas of bullae and sloughing epidermis across the right lower leg spreading up to the distal right thigh, also with desquamated areas with non-blanchable underlying soft tissue changes  Extremities: chronic lymphedema bilaterally   Neuro: Unable to assess    LABS:   Recent Labs     06/28/24  0448 06/29/24  0425   WBC 36.7* 29.6*   HGB 7.9* 7.6*   HCT 24.8* 23.8*   MCV 93.5 95.0   PLT 64* 77*          Recent Labs     06/28/24  0448 06/29/24  0425    139   K 4.4 4.0    102   CO2 20* 24   PHOS 5.0* 4.3   BUN 53* 64*   CREATININE 3.4* 3.7*          Recent Labs     06/27/24  0603 06/29/24  0425   AST 37 25   * 107*   BILIDIR  --  0.3   BILITOT 1.0 0.4   ALKPHOS 205* 291*        No results for input(s): \"LIPASE\", \"AMYLASE\" in the last 72 hours.     Recent Labs     06/27/24 2020   INR 1.27*        No results for input(s): \"CKTOTAL\", \"CKMB\", \"CKMBINDEX\", \"TROPONINI\" in the last 72 hours.      ASSESSMENT & PLAN:   This is a 64 y.o. female with  history of CKD, DVTs, HLD, HTN, thyroid disease, vascular dementia.  Contacted by vascular team due to concern for developing SJS/TENS.  Lesions are necrolytic, sloughing, and  extensive across the right lower extremity.  No appreciated mucosal involvement currently.     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 No need to treat cultures of wounds as it is common for them to be colonized with various bacteria.   -will change dressing today   - ID consulted for recommendations as well.   -remainder of care per primary      Aure Salvador MD  PGY1, General Surgery  06/29/24   8:14 AM   [employer]  [phone]

## 2024-06-30 VITALS
WEIGHT: 293 LBS | SYSTOLIC BLOOD PRESSURE: 167 MMHG | DIASTOLIC BLOOD PRESSURE: 56 MMHG | RESPIRATION RATE: 21 BRPM | HEART RATE: 78 BPM | BODY MASS INDEX: 51.91 KG/M2 | TEMPERATURE: 97 F | OXYGEN SATURATION: 100 % | HEIGHT: 63 IN

## 2024-06-30 LAB
BACTERIA BLD CULT ORG #2: NORMAL
BACTERIA BLD CULT: NORMAL
BACTERIA SPEC AEROBE CULT: NORMAL
BACTERIA SPEC ANAEROBE CULT: NORMAL
GRAM STN SPEC: NORMAL

## 2024-06-30 NOTE — PLAN OF CARE
Problem: Safety - Adult  Goal: Free from fall injury  Outcome: Completed  Flowsheets (Taken 6/30/2024 0220)  Free From Fall Injury:   Instruct family/caregiver on patient safety   Based on caregiver fall risk screen, instruct family/caregiver to ask for assistance with transferring infant if caregiver noted to have fall risk factors     Problem: Discharge Planning  Goal: Discharge to home or other facility with appropriate resources  Outcome: Completed     Problem: Pain  Goal: Verbalizes/displays adequate comfort level or baseline comfort level  Outcome: Completed     Problem: Skin/Tissue Integrity  Goal: Absence of new skin breakdown  Description: 1.  Monitor for areas of redness and/or skin breakdown  2.  Assess vascular access sites hourly  3.  Every 4-6 hours minimum:  Change oxygen saturation probe site  4.  Every 4-6 hours:  If on nasal continuous positive airway pressure, respiratory therapy assess nares and determine need for appliance change or resting period.  Outcome: Completed     Problem: ABCDS Injury Assessment  Goal: Absence of physical injury  Outcome: Completed     Problem: Cardiovascular - Adult  Goal: Maintains optimal cardiac output and hemodynamic stability  Outcome: Completed  Goal: Absence of cardiac dysrhythmias or at baseline  Outcome: Completed     Problem: Nutrition Deficit:  Goal: Optimize nutritional status  Outcome: Completed

## 2024-07-01 LAB
ECHO BSA: 2.51 M2
VAS LEFT ABI: 1
VAS LEFT ATA DIST PSV: 27.7 CM/S
VAS LEFT CFA DIST PSV: 136 CM/S
VAS LEFT DORSALIS PEDIS BP: 80 MMHG
VAS LEFT PERONEAL MID PSV: 22.3 CM/S
VAS LEFT PFA PROX PSV: 72.9 CM/S
VAS LEFT POP A PROX PSV: 87 CM/S
VAS LEFT POP A PROX VEL RATIO: 1.15
VAS LEFT PTA BP: 100 MMHG
VAS LEFT PTA DIST PSV: 37 CM/S
VAS LEFT PTA MID PSV: 36.4 CM/S
VAS LEFT SFA DIST PSV: 75.8 CM/S
VAS LEFT SFA DIST VEL RATIO: 0.86
VAS LEFT SFA MID PSV: 88.6 CM/S
VAS LEFT SFA MID VEL RATIO: 0.86
VAS LEFT SFA PROX PSV: 103 CM/S
VAS LEFT SFA PROX VEL RATIO: 0.76
VAS RIGHT ABI: 1.02
VAS RIGHT ARM BP: 100 MMHG
VAS RIGHT ATA DIST PSV: 65.2 CM/S
VAS RIGHT CFA DIST PSV: 94.1 CM/S
VAS RIGHT DORSALIS PEDIS BP: 102 MMHG
VAS RIGHT PERONEAL MID PSV: 58 CM/S
VAS RIGHT POP A DIST PSV: 51.1 CM/S
VAS RIGHT POP A PROX PSV: 60.9 CM/S
VAS RIGHT POP A PROX VEL RATIO: 0.52
VAS RIGHT PTA BP: 44 MMHG
VAS RIGHT PTA DIST PSV: 58.1 CM/S
VAS RIGHT PTA MID PSV: 30.8 CM/S
VAS RIGHT SFA DIST PSV: 117 CM/S
VAS RIGHT SFA DIST VEL RATIO: 1.45
VAS RIGHT SFA MID PSV: 80.7 CM/S
VAS RIGHT SFA MID VEL RATIO: 0.9
VAS RIGHT SFA PROX PSV: 86.7 CM/S
VAS RIGHT SFA PROX VEL RATIO: 0.9

## 2024-07-02 LAB
BACTERIA SPEC AEROBE CULT: NORMAL
BACTERIA SPEC ANAEROBE CULT: NORMAL
GRAM STN SPEC: NORMAL

## 2024-07-10 NOTE — PROGRESS NOTES
Physician Progress Note      PATIENT:               PANTERA TAYLOR  [facility] #:                  [id number]  :                       1960  ADMIT DATE:       2024 4:12 PM  DISCH DATE:        2024 8:40 PM  RESPONDING  PROVIDER #:        Abhishek Stoll MD          QUERY TEXT:    Patient admitted with sepsis.   Noted documentation of suspect acute   encephalopathy secondary to metabolic disarrangement, such as uremia, or   infection, hypoxia per Neuro-critical Care and Acute encephalopathy in setting   of underlying dementia per Critical Care. If possible, please document in   progress notes and discharge summary if you are evaluating and /or treating   any of the following:    The medical record reflects the following:  Risk Factors: Cardiac arrest, sepsis, acute respiratory failure, LUIS,   pneumonia, Cardiac arrest, SJS  Clinical Indicators: PN 24- Patient has risk factors for seizure, however   I suspect that a lot of her encephalopathy and physical exam is secondary to   metabolic derangements rather than seizure at this time.    Treatment: CRRT, intubation, Palliative Care, vancomycin, Zosyn    Thank You Princess Mendez RN, CDS [email or web address]  Options provided:  -- Acute metabolic encephalopathy confirmed and acute encephalopathy due to   dementia ruled out  -- Acute encephalopathy due to dementia confirmed and Acute metabolic   encephalopathy ruled out.  -- Other - I will add my own diagnosis  -- Disagree - Not applicable / Not valid  -- Disagree - Clinically unable to determine / Unknown  -- Refer to Clinical Documentation Reviewer    PROVIDER RESPONSE TEXT:    After study, Acute metabolic encephalopathy confirmed and acute encephalopathy   due to dementia ruled out.    Query created by: Princess Mendez on 7/3/2024 2:53 PM      Electronically signed by:  Abhishek Stoll MD 7/10/2024 2:38 PM

## 2024-07-13 NOTE — DISCHARGE SUMMARY
found.    CBC: No results for input(s): \"WBC\", \"HGB\", \"PLT\" in the last 72 hours.  BMP:  No results for input(s): \"NA\", \"K\", \"CL\", \"CO2\", \"BUN\", \"CREATININE\", \"GLUCOSE\" in the last 72 hours.  Hepatic: No results for input(s): \"AST\", \"ALT\", \"BILITOT\", \"ALKPHOS\" in the last 72 hours.    Invalid input(s): \"ALB\"  Lipids:   Lab Results   Component Value Date/Time    TRIG 470 06/26/2024 05:54 AM     Hemoglobin A1C: No results found for: \"LABA1C\"  TSH:   Lab Results   Component Value Date/Time    TSH 1.02 06/22/2024 12:39 PM     Troponin: No results found for: \"TROPONINT\"  Lactic Acid: No results for input(s): \"LACTA\" in the last 72 hours.  BNP: No results for input(s): \"PROBNP\" in the last 72 hours.  UA:  Lab Results   Component Value Date/Time    NITRU Negative 06/22/2024 03:23 AM    COLORU Yellow 06/22/2024 03:23 AM    PHUR 6.0 06/22/2024 03:23 AM    WBCUA 0-2 06/22/2024 03:23 AM    RBCUA 11-20 06/22/2024 03:23 AM    MUCUS 2+ 06/22/2024 03:23 AM    BACTERIA 4+ 06/22/2024 03:23 AM    CLARITYU Clear 06/22/2024 03:23 AM    LEUKOCYTESUR Negative 06/22/2024 03:23 AM    UROBILINOGEN 0.2 06/22/2024 03:23 AM    BILIRUBINUR Negative 06/22/2024 03:23 AM    BLOODU LARGE 06/22/2024 03:23 AM    GLUCOSEU Negative 06/22/2024 03:23 AM    KETUA TRACE 06/22/2024 03:23 AM     Urine Cultures: No results found for: \"LABURIN\"  Blood Cultures:   Lab Results   Component Value Date/Time    BC No Growth after 4 days of incubation. 06/26/2024 12:27 PM     Lab Results   Component Value Date/Time    BLOODCULT2 No Growth after 4 days of incubation. 06/26/2024 12:59 PM     Organism:   Lab Results   Component Value Date/Time    ORG Haemophilus influenzae DNA Detected 06/22/2024 12:00 PM    ORG Moraxella catarrhalis BY DNA 06/22/2024 12:00 PM       Time Spent Discharging patient 33 minutes    Electronically signed by Abhishek Ham MD on 7/13/2024 at 3:47 PM